# Patient Record
Sex: FEMALE | Race: WHITE | Employment: UNEMPLOYED | URBAN - METROPOLITAN AREA
[De-identification: names, ages, dates, MRNs, and addresses within clinical notes are randomized per-mention and may not be internally consistent; named-entity substitution may affect disease eponyms.]

---

## 2017-02-26 PROBLEM — L97.529 DIABETIC ULCER OF LEFT FOOT ASSOCIATED WITH TYPE 1 DIABETES MELLITUS (HCC): Status: ACTIVE | Noted: 2017-02-26

## 2017-02-26 PROBLEM — E10.621 DIABETIC ULCER OF LEFT FOOT ASSOCIATED WITH TYPE 1 DIABETES MELLITUS (HCC): Status: ACTIVE | Noted: 2017-02-26

## 2017-02-26 PROBLEM — I70.249 ATHEROSCLEROSIS OF NATIVE ARTERY OF LEFT LOWER EXTREMITY WITH ULCERATION (HCC): Status: ACTIVE | Noted: 2017-02-26

## 2017-04-18 PROBLEM — I70.243 ATHEROSCLEROSIS OF NATIVE ARTERY OF LEFT LOWER EXTREMITY WITH ULCERATION OF ANKLE (HCC): Status: ACTIVE | Noted: 2017-04-18

## 2017-05-10 PROBLEM — I70.245 ATHEROSCLEROSIS OF NATIVE ARTERIES OF LEFT LEG WITH ULCERATION OF OTHER PART OF FOOT (HCC): Chronic | Status: ACTIVE | Noted: 2017-05-10

## 2017-06-20 PROBLEM — I70.269 ATHEROSCLEROSIS OF NATIVE ARTERIES OF THE EXTREMITIES WITH GANGRENE (HCC): Status: ACTIVE | Noted: 2017-06-20

## 2017-06-28 PROBLEM — S78.112A ABOVE KNEE AMPUTATION OF LEFT LOWER EXTREMITY (HCC): Status: ACTIVE | Noted: 2017-06-28

## 2017-07-19 PROBLEM — I70.243 ATHEROSCLEROSIS OF NATIVE ARTERY OF LEFT LOWER EXTREMITY WITH ULCERATION OF ANKLE (HCC): Status: RESOLVED | Noted: 2017-04-18 | Resolved: 2017-07-19

## 2017-07-19 PROBLEM — I70.269 ATHEROSCLEROSIS OF NATIVE ARTERIES OF THE EXTREMITIES WITH GANGRENE (HCC): Status: RESOLVED | Noted: 2017-06-20 | Resolved: 2017-07-19

## 2017-07-19 PROBLEM — I70.245 ATHEROSCLEROSIS OF NATIVE ARTERIES OF LEFT LEG WITH ULCERATION OF OTHER PART OF FOOT (HCC): Chronic | Status: RESOLVED | Noted: 2017-05-10 | Resolved: 2017-07-19

## 2017-07-19 PROBLEM — L97.529 DIABETIC ULCER OF LEFT FOOT ASSOCIATED WITH TYPE 1 DIABETES MELLITUS (HCC): Status: RESOLVED | Noted: 2017-02-26 | Resolved: 2017-07-19

## 2017-07-19 PROBLEM — E10.621 DIABETIC ULCER OF LEFT FOOT ASSOCIATED WITH TYPE 1 DIABETES MELLITUS (HCC): Status: RESOLVED | Noted: 2017-02-26 | Resolved: 2017-07-19

## 2017-07-19 PROBLEM — I70.249 ATHEROSCLEROSIS OF NATIVE ARTERY OF LEFT LOWER EXTREMITY WITH ULCERATION (HCC): Status: RESOLVED | Noted: 2017-02-26 | Resolved: 2017-07-19

## 2018-06-04 ENCOUNTER — TELEPHONE (OUTPATIENT)
Dept: ADMINISTRATIVE | Age: 58
End: 2018-06-04

## 2018-08-24 ENCOUNTER — TELEPHONE (OUTPATIENT)
Dept: FAMILY MEDICINE CLINIC | Age: 58
End: 2018-08-24

## 2018-08-24 ENCOUNTER — OFFICE VISIT (OUTPATIENT)
Dept: FAMILY MEDICINE CLINIC | Age: 58
End: 2018-08-24
Payer: MEDICAID

## 2018-08-24 ENCOUNTER — HOSPITAL ENCOUNTER (OUTPATIENT)
Age: 58
Discharge: HOME | End: 2018-08-26
Payer: MEDICAID

## 2018-08-24 ENCOUNTER — TELEPHONE (OUTPATIENT)
Dept: CARDIOLOGY CLINIC | Age: 58
End: 2018-08-24

## 2018-08-24 VITALS
DIASTOLIC BLOOD PRESSURE: 69 MMHG | BODY MASS INDEX: 79.47 KG/M2 | HEART RATE: 74 BPM | HEIGHT: 55 IN | SYSTOLIC BLOOD PRESSURE: 126 MMHG | RESPIRATION RATE: 16 BRPM | OXYGEN SATURATION: 91 %

## 2018-08-24 DIAGNOSIS — I25.5 ISCHEMIC CARDIOMYOPATHY: ICD-10-CM

## 2018-08-24 DIAGNOSIS — Z91.89 AT RISK FOR MEDICATION NONCOMPLIANCE: ICD-10-CM

## 2018-08-24 DIAGNOSIS — S78.112A ABOVE KNEE AMPUTATION OF LEFT LOWER EXTREMITY (HCC): ICD-10-CM

## 2018-08-24 DIAGNOSIS — Z12.11 SCREENING FOR COLON CANCER: ICD-10-CM

## 2018-08-24 DIAGNOSIS — I10 ESSENTIAL HYPERTENSION: ICD-10-CM

## 2018-08-24 DIAGNOSIS — I25.10 CORONARY ARTERY DISEASE INVOLVING NATIVE CORONARY ARTERY OF NATIVE HEART WITHOUT ANGINA PECTORIS: ICD-10-CM

## 2018-08-24 DIAGNOSIS — E11.42 TYPE 2 DIABETES MELLITUS WITH DIABETIC POLYNEUROPATHY, UNSPECIFIED WHETHER LONG TERM INSULIN USE (HCC): ICD-10-CM

## 2018-08-24 DIAGNOSIS — E78.5 HYPERLIPIDEMIA WITH TARGET LDL LESS THAN 70: ICD-10-CM

## 2018-08-24 DIAGNOSIS — Z13.31 POSITIVE DEPRESSION SCREENING: ICD-10-CM

## 2018-08-24 DIAGNOSIS — E11.42 TYPE 2 DIABETES MELLITUS WITH DIABETIC POLYNEUROPATHY, UNSPECIFIED WHETHER LONG TERM INSULIN USE (HCC): Primary | ICD-10-CM

## 2018-08-24 DIAGNOSIS — E55.9 VITAMIN D DEFICIENCY: ICD-10-CM

## 2018-08-24 DIAGNOSIS — Z89.511 HISTORY OF RIGHT BELOW KNEE AMPUTATION (HCC): ICD-10-CM

## 2018-08-24 DIAGNOSIS — F32.A DEPRESSION, UNSPECIFIED DEPRESSION TYPE: ICD-10-CM

## 2018-08-24 DIAGNOSIS — Z12.31 ENCOUNTER FOR SCREENING MAMMOGRAM FOR BREAST CANCER: ICD-10-CM

## 2018-08-24 DIAGNOSIS — E11.42 DIABETIC POLYNEUROPATHY ASSOCIATED WITH TYPE 2 DIABETES MELLITUS (HCC): ICD-10-CM

## 2018-08-24 DIAGNOSIS — E78.5 DYSLIPIDEMIA: ICD-10-CM

## 2018-08-24 DIAGNOSIS — Z13.220 SCREENING FOR HYPERLIPIDEMIA: ICD-10-CM

## 2018-08-24 LAB
ALBUMIN SERPL-MCNC: 3.8 G/DL (ref 3.5–5.2)
ALP BLD-CCNC: 81 U/L (ref 35–104)
ALT SERPL-CCNC: 16 U/L (ref 0–32)
ANION GAP SERPL CALCULATED.3IONS-SCNC: 16 MMOL/L (ref 7–16)
AST SERPL-CCNC: 17 U/L (ref 0–31)
BASOPHILS ABSOLUTE: 0.04 E9/L (ref 0–0.2)
BASOPHILS RELATIVE PERCENT: 0.6 % (ref 0–2)
BILIRUB SERPL-MCNC: 0.4 MG/DL (ref 0–1.2)
BUN BLDV-MCNC: 19 MG/DL (ref 6–20)
CALCIUM SERPL-MCNC: 9.2 MG/DL (ref 8.6–10.2)
CHLORIDE BLD-SCNC: 97 MMOL/L (ref 98–107)
CHOLESTEROL, TOTAL: 375 MG/DL (ref 0–199)
CO2: 24 MMOL/L (ref 22–29)
CREAT SERPL-MCNC: 0.8 MG/DL (ref 0.5–1)
EOSINOPHILS ABSOLUTE: 0.22 E9/L (ref 0.05–0.5)
EOSINOPHILS RELATIVE PERCENT: 3.2 % (ref 0–6)
GFR AFRICAN AMERICAN: >60
GFR NON-AFRICAN AMERICAN: >60 ML/MIN/1.73
GLUCOSE BLD-MCNC: 372 MG/DL (ref 74–109)
HBA1C MFR BLD: 12.2 %
HCT VFR BLD CALC: 46.2 % (ref 34–48)
HDLC SERPL-MCNC: 33 MG/DL
HEMOGLOBIN: 14.3 G/DL (ref 11.5–15.5)
IMMATURE GRANULOCYTES #: 0.03 E9/L
IMMATURE GRANULOCYTES %: 0.4 % (ref 0–5)
LDL CHOLESTEROL CALCULATED: ABNORMAL MG/DL (ref 0–99)
LYMPHOCYTES ABSOLUTE: 1.66 E9/L (ref 1.5–4)
LYMPHOCYTES RELATIVE PERCENT: 24.3 % (ref 20–42)
MCH RBC QN AUTO: 30.6 PG (ref 26–35)
MCHC RBC AUTO-ENTMCNC: 31 % (ref 32–34.5)
MCV RBC AUTO: 98.7 FL (ref 80–99.9)
MONOCYTES ABSOLUTE: 0.49 E9/L (ref 0.1–0.95)
MONOCYTES RELATIVE PERCENT: 7.2 % (ref 2–12)
NEUTROPHILS ABSOLUTE: 4.39 E9/L (ref 1.8–7.3)
NEUTROPHILS RELATIVE PERCENT: 64.3 % (ref 43–80)
PDW BLD-RTO: 13.4 FL (ref 11.5–15)
PLATELET # BLD: 223 E9/L (ref 130–450)
PMV BLD AUTO: 10.5 FL (ref 7–12)
POTASSIUM SERPL-SCNC: 5.3 MMOL/L (ref 3.5–5)
RBC # BLD: 4.68 E12/L (ref 3.5–5.5)
SODIUM BLD-SCNC: 137 MMOL/L (ref 132–146)
TOTAL PROTEIN: 6.5 G/DL (ref 6.4–8.3)
TRIGL SERPL-MCNC: 688 MG/DL (ref 0–149)
TSH SERPL DL<=0.05 MIU/L-ACNC: 3.89 UIU/ML (ref 0.27–4.2)
VITAMIN D 25-HYDROXY: 15 NG/ML (ref 30–100)
VLDLC SERPL CALC-MCNC: ABNORMAL MG/DL
WBC # BLD: 6.8 E9/L (ref 4.5–11.5)

## 2018-08-24 PROCEDURE — 80061 LIPID PANEL: CPT

## 2018-08-24 PROCEDURE — G8431 POS CLIN DEPRES SCRN F/U DOC: HCPCS | Performed by: FAMILY MEDICINE

## 2018-08-24 PROCEDURE — G8427 DOCREV CUR MEDS BY ELIG CLIN: HCPCS | Performed by: FAMILY MEDICINE

## 2018-08-24 PROCEDURE — 84443 ASSAY THYROID STIM HORMONE: CPT

## 2018-08-24 PROCEDURE — 3046F HEMOGLOBIN A1C LEVEL >9.0%: CPT | Performed by: FAMILY MEDICINE

## 2018-08-24 PROCEDURE — 82306 VITAMIN D 25 HYDROXY: CPT

## 2018-08-24 PROCEDURE — 99205 OFFICE O/P NEW HI 60 MIN: CPT | Performed by: FAMILY MEDICINE

## 2018-08-24 PROCEDURE — 80053 COMPREHEN METABOLIC PANEL: CPT

## 2018-08-24 PROCEDURE — G8417 CALC BMI ABV UP PARAM F/U: HCPCS | Performed by: FAMILY MEDICINE

## 2018-08-24 PROCEDURE — 3017F COLORECTAL CA SCREEN DOC REV: CPT | Performed by: FAMILY MEDICINE

## 2018-08-24 PROCEDURE — 83036 HEMOGLOBIN GLYCOSYLATED A1C: CPT | Performed by: FAMILY MEDICINE

## 2018-08-24 PROCEDURE — G8598 ASA/ANTIPLAT THER USED: HCPCS | Performed by: FAMILY MEDICINE

## 2018-08-24 PROCEDURE — 1036F TOBACCO NON-USER: CPT | Performed by: FAMILY MEDICINE

## 2018-08-24 PROCEDURE — 2022F DILAT RTA XM EVC RTNOPTHY: CPT | Performed by: FAMILY MEDICINE

## 2018-08-24 PROCEDURE — 85025 COMPLETE CBC W/AUTO DIFF WBC: CPT

## 2018-08-24 PROCEDURE — 96127 BRIEF EMOTIONAL/BEHAV ASSMT: CPT | Performed by: FAMILY MEDICINE

## 2018-08-24 RX ORDER — SERTRALINE HYDROCHLORIDE 100 MG/1
100 TABLET, FILM COATED ORAL DAILY
COMMUNITY
End: 2018-08-24 | Stop reason: SDUPTHER

## 2018-08-24 RX ORDER — ISOSORBIDE MONONITRATE 60 MG/1
60 TABLET, EXTENDED RELEASE ORAL DAILY
Qty: 30 TABLET | Refills: 3 | Status: SHIPPED | OUTPATIENT
Start: 2018-08-24 | End: 2019-01-07 | Stop reason: SDUPTHER

## 2018-08-24 RX ORDER — GLIPIZIDE 5 MG/1
5 TABLET ORAL ONCE
Qty: 30 TABLET | Refills: 0 | Status: SHIPPED | OUTPATIENT
Start: 2018-08-24 | End: 2019-09-20

## 2018-08-24 RX ORDER — GABAPENTIN 600 MG/1
600 TABLET ORAL 3 TIMES DAILY
Qty: 90 TABLET | Refills: 2 | Status: SHIPPED | OUTPATIENT
Start: 2018-08-24 | End: 2018-10-01 | Stop reason: SDUPTHER

## 2018-08-24 RX ORDER — AMLODIPINE BESYLATE 2.5 MG/1
2.5 TABLET ORAL DAILY
Qty: 30 TABLET | Refills: 3 | Status: SHIPPED | OUTPATIENT
Start: 2018-08-24 | End: 2019-01-07 | Stop reason: SDUPTHER

## 2018-08-24 RX ORDER — METOPROLOL TARTRATE 100 MG/1
100 TABLET ORAL 2 TIMES DAILY
Qty: 60 TABLET | Refills: 2 | Status: SHIPPED | OUTPATIENT
Start: 2018-08-24 | End: 2019-01-14 | Stop reason: SDUPTHER

## 2018-08-24 RX ORDER — LISINOPRIL 2.5 MG/1
2.5 TABLET ORAL DAILY
Qty: 90 TABLET | Refills: 3 | Status: SHIPPED | OUTPATIENT
Start: 2018-08-24 | End: 2019-03-01 | Stop reason: SDUPTHER

## 2018-08-24 RX ORDER — ASPIRIN 81 MG/1
81 TABLET ORAL DAILY
Qty: 30 TABLET | Refills: 3 | Status: SHIPPED | OUTPATIENT
Start: 2018-08-24 | End: 2019-03-01 | Stop reason: SDUPTHER

## 2018-08-24 RX ORDER — ATORVASTATIN CALCIUM 80 MG/1
80 TABLET, FILM COATED ORAL DAILY
Qty: 30 TABLET | Refills: 3 | Status: SHIPPED | OUTPATIENT
Start: 2018-08-24 | End: 2019-03-01 | Stop reason: SDUPTHER

## 2018-08-24 RX ORDER — SERTRALINE HYDROCHLORIDE 100 MG/1
100 TABLET, FILM COATED ORAL DAILY
Qty: 30 TABLET | Refills: 2 | Status: SHIPPED | OUTPATIENT
Start: 2018-08-24 | End: 2018-12-04 | Stop reason: SDUPTHER

## 2018-08-24 RX ORDER — METOPROLOL TARTRATE 100 MG/1
100 TABLET ORAL 2 TIMES DAILY
COMMUNITY
End: 2018-08-24 | Stop reason: SDUPTHER

## 2018-08-24 RX ORDER — ONDANSETRON 4 MG/1
TABLET, FILM COATED ORAL
Qty: 30 TABLET | Refills: 0 | Status: SHIPPED | OUTPATIENT
Start: 2018-08-24 | End: 2019-09-20 | Stop reason: SDUPTHER

## 2018-08-24 RX ORDER — GLIPIZIDE 5 MG/1
5 TABLET ORAL ONCE
COMMUNITY
End: 2018-08-24 | Stop reason: SDUPTHER

## 2018-08-24 RX ORDER — SERTRALINE HYDROCHLORIDE 25 MG/1
125 TABLET, FILM COATED ORAL DAILY
Qty: 30 TABLET | Refills: 2 | Status: SHIPPED | OUTPATIENT
Start: 2018-08-24 | End: 2019-03-01

## 2018-08-24 RX ORDER — RANOLAZINE 1000 MG/1
1000 TABLET, EXTENDED RELEASE ORAL 2 TIMES DAILY
Qty: 60 TABLET | Refills: 3 | Status: SHIPPED | OUTPATIENT
Start: 2018-08-24 | End: 2019-03-01 | Stop reason: SDUPTHER

## 2018-08-24 RX ORDER — PANTOPRAZOLE SODIUM 40 MG/1
40 TABLET, DELAYED RELEASE ORAL
Qty: 30 TABLET | Refills: 0 | Status: SHIPPED | OUTPATIENT
Start: 2018-08-24 | End: 2019-01-07 | Stop reason: SDUPTHER

## 2018-08-24 ASSESSMENT — PATIENT HEALTH QUESTIONNAIRE - PHQ9
SUM OF ALL RESPONSES TO PHQ QUESTIONS 1-9: 9
SUM OF ALL RESPONSES TO PHQ9 QUESTIONS 1 & 2: 4
SUM OF ALL RESPONSES TO PHQ QUESTIONS 1-9: 9
9. THOUGHTS THAT YOU WOULD BE BETTER OFF DEAD, OR OF HURTING YOURSELF: 1
10. IF YOU CHECKED OFF ANY PROBLEMS, HOW DIFFICULT HAVE THESE PROBLEMS MADE IT FOR YOU TO DO YOUR WORK, TAKE CARE OF THINGS AT HOME, OR GET ALONG WITH OTHER PEOPLE: 0
3. TROUBLE FALLING OR STAYING ASLEEP: 0
7. TROUBLE CONCENTRATING ON THINGS, SUCH AS READING THE NEWSPAPER OR WATCHING TELEVISION: 2
5. POOR APPETITE OR OVEREATING: 1
2. FEELING DOWN, DEPRESSED OR HOPELESS: 2
4. FEELING TIRED OR HAVING LITTLE ENERGY: 0
8. MOVING OR SPEAKING SO SLOWLY THAT OTHER PEOPLE COULD HAVE NOTICED. OR THE OPPOSITE, BEING SO FIGETY OR RESTLESS THAT YOU HAVE BEEN MOVING AROUND A LOT MORE THAN USUAL: 0
6. FEELING BAD ABOUT YOURSELF - OR THAT YOU ARE A FAILURE OR HAVE LET YOURSELF OR YOUR FAMILY DOWN: 1
1. LITTLE INTEREST OR PLEASURE IN DOING THINGS: 2

## 2018-08-24 NOTE — PROGRESS NOTES
Ricki Jain   1960    Chief Complaint:     Chief Complaint   Patient presents with   Felicity Multani New Doctor    Diabetes       HPI  Ricki Jain 59 y.o. who presents as a new patient to establish care. Previous pcp was dr Estefany Velazco. Insurance would not pay for home visit anymore and needed to find a new pcp. Patient does not know what medications she should be taking is very confused about all her medications. We discussed all her medications and it seems she has run out of many of the medications she should be taking. Medication reconciliation was very difficult due to this and the majority of the visit was discussing all her medications. She has multiple medical issues including diabetes uncontrolled, cad, cardiomyopathy (does follow with cardiology however has not followed up as recommended), pvd s/p bilateral amputation, HTN, HLD, chronic pain, depression. Diabetes   This is currently her most pressing issue as most of her disease states are from uncontrolled diabetes. We discussed the need to control this for many reasons and patient understands. She states that her last A1c was 14 - today it is 12.2. At last visit she was given glipizide. She currently is using 18 units novolog before meals - though she only eats 1-2 meals a day. She does take her basal insulin however she does not take this at the same time every night. She states she sleeps all day and is difficult for her to remember all her medications. Her sugars range to 200-300. She denies any chest pain, dyspnea, swelling, neuro changes. She does admit to much pain throughout her body. She does have some phantom pain - she follows with pain management. Cad  She also was supposed to follow up with her cardiologist however patient did not get requested imaging completed (she states she could not due to glucose? - she was not exactly sure).  She denies any chest pain currently but does have chest pain and takes nitro as needed which 12/18/2013    R SFA 6x200 mm, Delatore    CARDIAC CATHETERIZATION  12/2012    [facility] AT [facility] in [address]  5/29/2013    Dr. Franklin Soto  06/22/2017    Dr. Jose Henderson      x 3    ECHO COMPL W DOP COLOR FLOW  9/22/2012 9/22/2012-echocardiogram revealed an LVEF of 60%    ECHO COMPL W DOP COLOR FLOW  1/28/2013         ECHO COMPL W DOP COLOR FLOW  10/20/2013         ECHO COMPLETE  12/13/2013         FOOT SURGERY  12/15/2013    incision and drainage with bone biopsy    LEG AMPUTATION BELOW KNEE Right 2/27/14    right below the knee amp    TOE AMPUTATION      right foot    TONSILLECTOMY      TRANSESOPHAGEAL ECHOCARDIOGRAM  10/23/2013         VASCULAR SURGERY         Social History     Social History    Marital status:      Spouse name: N/A    Number of children: N/A    Years of education: N/A     Social History Main Topics    Smoking status: Former Smoker     Packs/day: 1.00     Years: 20.00     Types: Cigarettes     Quit date: 10/4/2002    Smokeless tobacco: Never Used      Comment: stopped 10 years ago    Alcohol use No      Comment: no    Drug use: No    Sexual activity: Yes     Partners: Male     Other Topics Concern    None     Social History Narrative    None       Family History   Problem Relation Age of Onset    Diabetes Mother     Hypertension Mother     Diabetes Father     Hypertension Father     Heart Failure Father     Cancer Brother           Current Outpatient Prescriptions   Medication Sig Dispense Refill    metoprolol (LOPRESSOR) 100 MG tablet Take 1 tablet by mouth 2 times daily 60 tablet 2    sertraline (ZOLOFT) 100 MG tablet Take 1 tablet by mouth daily 30 tablet 2    sertraline (ZOLOFT) 25 MG tablet Take 5 tablets by mouth daily 30 tablet 2    glipiZIDE (GLUCOTROL) 5 MG tablet Take 1 tablet by mouth once for 1 dose 30 tablet 0    insulin aspart (NOVOLOG FLEXPEN) 100 UNIT/ML injection pen Inject 18 Units into the skin 3 times daily (before meals) 5 pen 2    insulin glargine (BASAGLAR KWIKPEN) 100 UNIT/ML injection pen Inject 65 Units into the skin nightly 5 pen 2    ondansetron (ZOFRAN) 4 MG tablet take 1 tablet by mouth every 8 hours if needed 30 tablet 0    aspirin 81 MG EC tablet Take 1 tablet by mouth daily 30 tablet 3    gabapentin (NEURONTIN) 600 MG tablet Take 1 tablet by mouth 3 times daily for 90 days. . 90 tablet 2    amLODIPine (NORVASC) 2.5 MG tablet Take 1 tablet by mouth daily 30 tablet 3    atorvastatin (LIPITOR) 80 MG tablet Take 1 tablet by mouth daily 30 tablet 3    lisinopril (PRINIVIL;ZESTRIL) 2.5 MG tablet Take 1 tablet by mouth daily 90 tablet 3    pantoprazole (PROTONIX) 40 MG tablet Take 1 tablet by mouth every morning (before breakfast) 30 tablet 0    ranolazine (RANEXA) 1000 MG extended release tablet Take 1 tablet by mouth 2 times daily 60 tablet 3    isosorbide mononitrate (IMDUR) 60 MG extended release tablet Take 1 tablet by mouth daily 30 tablet 3    blood glucose test strips (ASCENSIA AUTODISC VI;ONE TOUCH ULTRA TEST VI) strip Check 4 times daily 100 strip 5    Handicap Placard MISC by Does not apply route Patient cannot walk 200 ft without stopping to rest.    Expiration 8/2023 1 each 0    docusate sodium (COLACE, DULCOLAX) 100 MG CAPS Take 200 mg by mouth 3 times daily 180 capsule 3    Lancets MISC Give one touch or what insurance covers. Test three times a day, fasting and before lunch and supper 100 each 3     No current facility-administered medications for this visit. Allergies   Allergen Reactions    Effexor [Venlafaxine Hcl] Other (See Comments)     Altered mental status       REVIEW OF SYSTEMS  Review of Systems   Constitutional: Positive for activity change (decreased) and fatigue. Negative for chills and fever. In wheelchair due to bilateral LE amputation   HENT: Negative for ear pain, sinus pressure, sneezing and sore throat. reviewed. Laboratory/Imaging: All laboratory and radiology results have been personally reviewed by myself      ASSESSMENT/PLAN:     Diagnosis Orders   1. Type 2 diabetes mellitus with diabetic polyneuropathy, unspecified whether long term insulin use (HCC)  POCT glycosylated hemoglobin (Hb A1C)    CBC Auto Differential    Comprehensive Metabolic Panel    TSH without Reflex    [facility]   2. Coronary artery disease involving native coronary artery of native heart without angina pectoris  aspirin 81 MG EC tablet    [facility]   3. History of right below knee amputation [facility]   4. Hyperlipidemia with target LDL less than 70  Lipid Panel   5. Depression, unspecified depression type  [facility]   6. Essential hypertension  lisinopril (PRINIVIL;ZESTRIL) 2.5 MG tablet    [facility]   7. Dyslipidemia  atorvastatin (LIPITOR) 80 MG tablet   8. Vitamin D deficiency  Vitamin D 25 Hydrox, D2 & D3   9. Diabetic polyneuropathy associated with type 2 diabetes mellitus ([facility] [address]     10. Above knee amputation of left lower extremity [facility]   11. Ischemic cardiomyopathy     12. Encounter for screening mammogram for breast cancer  [facility] Digital Screen Bilateral [[id number]]   12. Screening for colon cancer     14. Screening for hyperlipidemia     15. At risk for medication noncompliance  [facility]   16. Positive depression screening  Positive Screen for Clinical Depression with a Documented Follow-up Plan      Due to medication noncompliance/confusion, will refer to home care for evaluation. She may need additional needs in home including wheelchair ramp. She also needs hanidcap placard and new wheelchair. She does have motorized wheelchair at home. Medication list was reconciled based on previous notes from cardiology in chart.  Will also need to obtain records from previous physician to assure no medications were adjusted. Did recommend for patient to follow up with cardiology as recommended. As for diabetes did recommend for patient to log her sugars so we can adjust insulin accordingly. As her fasting are in the 300s will increase basal insulin to 65 units. Her mood is depressed - she has been out of her medications these were refilled today. She will follow up in 2 weeks for re-evaluation. More than 60 minutes was spent with the patient during the encounter, during which more than half the time was spent counseling on the above concerns        HM reviewed today and updated as appropriate  Call or go to ED immediately if symptoms worsen or persist.  Future Appointments  Date Time Provider Kirk Bautista   9/11/2018 9:30 AM DO Jackson Gaytankenneth [facility]   9/24/2018 11:30 AM Krupa Cantrell MD [facility] None     Or sooner if necessary. Educational materials and/or home exercises printed for patient's review and were included in patient instructions on his/her After Visit Summary and given to patient at the end of visit.       Counseled regarding above diagnosis, including possible risks and complications,  especially if left uncontrolled.     Counseled regarding the possible side effects, risks, benefits and alternatives to treatment; patient and/or guardian verbalizes understanding, agrees, feels comfortable with and wishes to proceed with above treatment plan.     Advised patient to call with any new medication issues, and read all Rx info from pharmacy to assure aware of all possible risks and side effects of medication before taking.     Reviewed age and gender appropriate health screening exams and vaccinations.   Advised patient regarding importance of keeping up with recommended health maintenance and to schedule as soon as possible if overdue, as this is important in assessing for undiagnosed pathology, especially cancer, as well as protecting against potentially harmful/life threatening disease.          Patient and/or guardian verbalizes understanding and agrees with above counseling, assessment and plan.     All questions answered. Marv Ruvalcaba, [address], DO  8/27/2018        NOTE: This report was transcribed using voice recognition software. Every effort was made to ensure accuracy; however, inadvertent computerized transcription errors may be present    On the basis of positive PHQ-9 screening (PHQ-9 Total Score: 9), the following plan was implemented: medication prescribed: Zoloft- 125- patient will call for any significant medication side effects or worsening symptoms of depression. Patient will follow-up in 2 week(s) with PCP.

## 2018-08-27 ASSESSMENT — ENCOUNTER SYMPTOMS
ABDOMINAL PAIN: 0
BACK PAIN: 1
DIARRHEA: 0
SHORTNESS OF BREATH: 0
COUGH: 0
SORE THROAT: 0
CONSTIPATION: 0
SINUS PRESSURE: 0
EYE PAIN: 0

## 2018-08-27 NOTE — TELEPHONE ENCOUNTER
She should have followed up by now. Probably should have had a 6 month follow-up. Have her come in for a routine office visit. He can discuss all of this again.

## 2018-08-28 ENCOUNTER — TELEPHONE (OUTPATIENT)
Dept: FAMILY MEDICINE CLINIC | Age: 58
End: 2018-08-28

## 2018-08-28 NOTE — TELEPHONE ENCOUNTER
Medications not covered under patients plan are novolog, freestyle and zoloft 5 tabs of 25 mg. Wish for these to be changed per insurance.  Please prior auth the novolog pen as patients a1c is uncontrolled and unable to do the vial form

## 2018-11-20 ENCOUNTER — TELEPHONE (OUTPATIENT)
Dept: FAMILY MEDICINE CLINIC | Age: 58
End: 2018-11-20

## 2018-12-05 RX ORDER — SERTRALINE HYDROCHLORIDE 100 MG/1
TABLET, FILM COATED ORAL
Qty: 30 TABLET | Refills: 2 | Status: SHIPPED | OUTPATIENT
Start: 2018-12-05 | End: 2019-03-01 | Stop reason: SDUPTHER

## 2018-12-24 RX ORDER — GLIPIZIDE 5 MG/1
5 TABLET ORAL ONCE
Qty: 30 TABLET | Refills: 0 | Status: CANCELLED | OUTPATIENT
Start: 2018-12-24 | End: 2018-12-24

## 2018-12-24 RX ORDER — INSULIN GLARGINE 100 [IU]/ML
INJECTION, SOLUTION SUBCUTANEOUS
Qty: 15 ML | Refills: 2 | Status: SHIPPED | OUTPATIENT
Start: 2018-12-24 | End: 2019-03-01 | Stop reason: SDUPTHER

## 2018-12-24 RX ORDER — SERTRALINE HYDROCHLORIDE 100 MG/1
TABLET, FILM COATED ORAL
Qty: 30 TABLET | Refills: 2 | Status: CANCELLED | OUTPATIENT
Start: 2018-12-24

## 2018-12-24 NOTE — TELEPHONE ENCOUNTER
From: Дмитрий Landry  Sent: 12/22/2018 1:03 PM EST  Subject: Medication Renewal Request    Дмитрий Landry would like a refill of the following medications:     insulin glargine (BASAGLAR KWIKPEN) 100 UNIT/ML injection pen [Violetta Diaz, DO]   Patient Comment: as soon as possible    Preferred pharmacy: [facility] [id number], [address] [place] [address] [address]

## 2018-12-24 NOTE — TELEPHONE ENCOUNTER
From: Shazia Reddy  Sent: 12/22/2018 1:01 PM EST  Subject: Medication Renewal Request    Shazia Reddy would like a refill of the following medications:     blood glucose test strips (ASCENSIA AUTODISC VI;ONE TOUCH ULTRA TEST VI) strip Jeraldene Boast, DO]    Preferred pharmacy: [id number] [phone] - F [phone]        Medication renewals requested in this message routed separately:     glipiZIDE (GLUCOTROL) 5 MG tablet [Violetta Diaz, DO]     HUMALOG 100 UNIT/ML injection vial [Violetta Diaz, DO]   Patient Comment: need it as soon as possible     sertraline (ZOLOFT) 100 MG tablet [Violetta Diaz, DO]

## 2018-12-26 ENCOUNTER — TELEPHONE (OUTPATIENT)
Dept: FAMILY MEDICINE CLINIC | Age: 58
End: 2018-12-26

## 2018-12-26 RX ORDER — BLOOD-GLUCOSE METER
1 EACH MISCELLANEOUS DAILY
Qty: 1 KIT | Refills: 0 | Status: CANCELLED | OUTPATIENT
Start: 2018-12-26

## 2019-01-08 RX ORDER — PANTOPRAZOLE SODIUM 40 MG/1
TABLET, DELAYED RELEASE ORAL
Qty: 30 TABLET | Refills: 0 | Status: SHIPPED | OUTPATIENT
Start: 2019-01-08 | End: 2019-03-01 | Stop reason: SDUPTHER

## 2019-01-08 RX ORDER — AMLODIPINE BESYLATE 2.5 MG/1
TABLET ORAL
Qty: 30 TABLET | Refills: 3 | Status: SHIPPED | OUTPATIENT
Start: 2019-01-08 | End: 2019-03-01 | Stop reason: SDUPTHER

## 2019-01-08 RX ORDER — ISOSORBIDE MONONITRATE 60 MG/1
TABLET, EXTENDED RELEASE ORAL
Qty: 30 TABLET | Refills: 3 | Status: SHIPPED | OUTPATIENT
Start: 2019-01-08 | End: 2019-03-01 | Stop reason: SDUPTHER

## 2019-01-15 RX ORDER — METOPROLOL TARTRATE 100 MG/1
TABLET ORAL
Qty: 60 TABLET | Refills: 2 | Status: SHIPPED | OUTPATIENT
Start: 2019-01-15 | End: 2019-03-01 | Stop reason: SDUPTHER

## 2019-03-01 ENCOUNTER — OFFICE VISIT (OUTPATIENT)
Dept: INTERNAL MEDICINE | Age: 59
End: 2019-03-01
Payer: MEDICAID

## 2019-03-01 VITALS
HEART RATE: 72 BPM | BODY MASS INDEX: 79.47 KG/M2 | DIASTOLIC BLOOD PRESSURE: 59 MMHG | SYSTOLIC BLOOD PRESSURE: 129 MMHG | TEMPERATURE: 98.1 F | HEIGHT: 55 IN | RESPIRATION RATE: 18 BRPM

## 2019-03-01 DIAGNOSIS — E55.9 VITAMIN D DEFICIENCY: ICD-10-CM

## 2019-03-01 DIAGNOSIS — E11.42 TYPE 2 DIABETES MELLITUS WITH DIABETIC POLYNEUROPATHY, UNSPECIFIED WHETHER LONG TERM INSULIN USE (HCC): Primary | ICD-10-CM

## 2019-03-01 DIAGNOSIS — Z12.4 PAP SMEAR FOR CERVICAL CANCER SCREENING: ICD-10-CM

## 2019-03-01 DIAGNOSIS — K59.00 CONSTIPATION, UNSPECIFIED CONSTIPATION TYPE: ICD-10-CM

## 2019-03-01 DIAGNOSIS — Z12.11 SCREENING FOR COLON CANCER: ICD-10-CM

## 2019-03-01 DIAGNOSIS — E78.5 DYSLIPIDEMIA: ICD-10-CM

## 2019-03-01 DIAGNOSIS — I25.10 CORONARY ARTERY DISEASE INVOLVING NATIVE CORONARY ARTERY OF NATIVE HEART WITHOUT ANGINA PECTORIS: ICD-10-CM

## 2019-03-01 DIAGNOSIS — K21.9 GASTROESOPHAGEAL REFLUX DISEASE, ESOPHAGITIS PRESENCE NOT SPECIFIED: ICD-10-CM

## 2019-03-01 DIAGNOSIS — I25.5 ISCHEMIC CARDIOMYOPATHY: ICD-10-CM

## 2019-03-01 DIAGNOSIS — Z12.39 BREAST CANCER SCREENING: ICD-10-CM

## 2019-03-01 DIAGNOSIS — I21.4 NSTEMI (NON-ST ELEVATED MYOCARDIAL INFARCTION) (HCC): ICD-10-CM

## 2019-03-01 DIAGNOSIS — Z23 NEEDS FLU SHOT: ICD-10-CM

## 2019-03-01 DIAGNOSIS — Z23 ENCOUNTER FOR HERPES ZOSTER VACCINATION: ICD-10-CM

## 2019-03-01 DIAGNOSIS — S78.112A ABOVE KNEE AMPUTATION OF LEFT LOWER EXTREMITY (HCC): ICD-10-CM

## 2019-03-01 DIAGNOSIS — I10 ESSENTIAL HYPERTENSION: ICD-10-CM

## 2019-03-01 DIAGNOSIS — F32.A DEPRESSION, UNSPECIFIED DEPRESSION TYPE: ICD-10-CM

## 2019-03-01 LAB — HBA1C MFR BLD: 11.3 %

## 2019-03-01 PROCEDURE — 90686 IIV4 VACC NO PRSV 0.5 ML IM: CPT

## 2019-03-01 PROCEDURE — G0008 ADMIN INFLUENZA VIRUS VAC: HCPCS

## 2019-03-01 PROCEDURE — 3046F HEMOGLOBIN A1C LEVEL >9.0%: CPT | Performed by: INTERNAL MEDICINE

## 2019-03-01 PROCEDURE — 83036 HEMOGLOBIN GLYCOSYLATED A1C: CPT | Performed by: INTERNAL MEDICINE

## 2019-03-01 PROCEDURE — 3017F COLORECTAL CA SCREEN DOC REV: CPT | Performed by: INTERNAL MEDICINE

## 2019-03-01 PROCEDURE — G8417 CALC BMI ABV UP PARAM F/U: HCPCS | Performed by: INTERNAL MEDICINE

## 2019-03-01 PROCEDURE — G8427 DOCREV CUR MEDS BY ELIG CLIN: HCPCS | Performed by: INTERNAL MEDICINE

## 2019-03-01 PROCEDURE — 6360000002 HC RX W HCPCS

## 2019-03-01 PROCEDURE — 2022F DILAT RTA XM EVC RTNOPTHY: CPT | Performed by: INTERNAL MEDICINE

## 2019-03-01 PROCEDURE — 99215 OFFICE O/P EST HI 40 MIN: CPT | Performed by: INTERNAL MEDICINE

## 2019-03-01 PROCEDURE — 99213 OFFICE O/P EST LOW 20 MIN: CPT | Performed by: INTERNAL MEDICINE

## 2019-03-01 PROCEDURE — G8482 FLU IMMUNIZE ORDER/ADMIN: HCPCS | Performed by: INTERNAL MEDICINE

## 2019-03-01 PROCEDURE — 1036F TOBACCO NON-USER: CPT | Performed by: INTERNAL MEDICINE

## 2019-03-01 PROCEDURE — G8598 ASA/ANTIPLAT THER USED: HCPCS | Performed by: INTERNAL MEDICINE

## 2019-03-01 RX ORDER — AMLODIPINE BESYLATE 2.5 MG/1
TABLET ORAL
Qty: 30 TABLET | Refills: 3 | Status: SHIPPED | OUTPATIENT
Start: 2019-03-01 | End: 2019-09-20 | Stop reason: SDUPTHER

## 2019-03-01 RX ORDER — PANTOPRAZOLE SODIUM 40 MG/1
TABLET, DELAYED RELEASE ORAL
Qty: 30 TABLET | Refills: 3 | Status: SHIPPED | OUTPATIENT
Start: 2019-03-01 | End: 2019-07-11 | Stop reason: SDUPTHER

## 2019-03-01 RX ORDER — LISINOPRIL 2.5 MG/1
2.5 TABLET ORAL DAILY
Qty: 30 TABLET | Refills: 3 | Status: SHIPPED | OUTPATIENT
Start: 2019-03-01 | End: 2019-09-20 | Stop reason: SDUPTHER

## 2019-03-01 RX ORDER — SERTRALINE HYDROCHLORIDE 100 MG/1
TABLET, FILM COATED ORAL
Qty: 30 TABLET | Refills: 3 | Status: SHIPPED | OUTPATIENT
Start: 2019-03-01 | End: 2019-07-21 | Stop reason: SDUPTHER

## 2019-03-01 RX ORDER — LANCETS 30 GAUGE
EACH MISCELLANEOUS
Qty: 100 EACH | Refills: 2 | Status: SHIPPED | OUTPATIENT
Start: 2019-03-01 | End: 2019-09-20 | Stop reason: SDUPTHER

## 2019-03-01 RX ORDER — ERGOCALCIFEROL 1.25 MG/1
50000 CAPSULE ORAL WEEKLY
Qty: 4 CAPSULE | Refills: 3 | Status: CANCELLED | OUTPATIENT
Start: 2019-03-01

## 2019-03-01 RX ORDER — ASPIRIN 81 MG/1
81 TABLET ORAL DAILY
Qty: 30 TABLET | Refills: 3 | Status: SHIPPED | OUTPATIENT
Start: 2019-03-01 | End: 2019-06-28 | Stop reason: SDUPTHER

## 2019-03-01 RX ORDER — RANOLAZINE 1000 MG/1
1000 TABLET, EXTENDED RELEASE ORAL 2 TIMES DAILY
Qty: 60 TABLET | Refills: 2 | Status: SHIPPED | OUTPATIENT
Start: 2019-03-01 | End: 2019-07-01 | Stop reason: SDUPTHER

## 2019-03-01 RX ORDER — ONDANSETRON 4 MG/1
TABLET, FILM COATED ORAL
Qty: 30 TABLET | Refills: 2 | Status: CANCELLED | OUTPATIENT
Start: 2019-03-01

## 2019-03-01 RX ORDER — PSEUDOEPHEDRINE HCL 30 MG
200 TABLET ORAL 3 TIMES DAILY
Qty: 180 CAPSULE | Refills: 2 | Status: SHIPPED | OUTPATIENT
Start: 2019-03-01 | End: 2019-03-01

## 2019-03-01 RX ORDER — ISOSORBIDE MONONITRATE 60 MG/1
TABLET, EXTENDED RELEASE ORAL
Qty: 30 TABLET | Refills: 3 | Status: SHIPPED | OUTPATIENT
Start: 2019-03-01 | End: 2019-09-20

## 2019-03-01 RX ORDER — DOCUSATE SODIUM 100 MG/1
100 CAPSULE, LIQUID FILLED ORAL 4 TIMES DAILY PRN
Qty: 30 CAPSULE | Refills: 2 | Status: SHIPPED | OUTPATIENT
Start: 2019-03-01 | End: 2019-09-20 | Stop reason: SDUPTHER

## 2019-03-01 RX ORDER — ATORVASTATIN CALCIUM 80 MG/1
80 TABLET, FILM COATED ORAL DAILY
Qty: 90 TABLET | Refills: 0 | Status: SHIPPED | OUTPATIENT
Start: 2019-03-01 | End: 2019-09-20 | Stop reason: SDUPTHER

## 2019-03-01 RX ORDER — METOPROLOL TARTRATE 100 MG/1
TABLET ORAL
Qty: 60 TABLET | Refills: 3 | Status: SHIPPED | OUTPATIENT
Start: 2019-03-01 | End: 2019-07-01 | Stop reason: SDUPTHER

## 2019-03-01 ASSESSMENT — ENCOUNTER SYMPTOMS
ABDOMINAL PAIN: 0
DIARRHEA: 0
NAUSEA: 0
SORE THROAT: 0
COUGH: 0
SHORTNESS OF BREATH: 0
VOMITING: 0

## 2019-03-26 ENCOUNTER — TELEPHONE (OUTPATIENT)
Dept: OBGYN | Age: 59
End: 2019-03-26

## 2019-03-28 ENCOUNTER — TELEPHONE (OUTPATIENT)
Dept: INTERNAL MEDICINE | Age: 59
End: 2019-03-28

## 2019-04-01 ENCOUNTER — TELEPHONE (OUTPATIENT)
Dept: INTERNAL MEDICINE | Age: 59
End: 2019-04-01

## 2019-04-10 ENCOUNTER — TELEPHONE (OUTPATIENT)
Dept: INTERNAL MEDICINE | Age: 59
End: 2019-04-10

## 2019-05-03 ENCOUNTER — TELEPHONE (OUTPATIENT)
Dept: INTERNAL MEDICINE | Age: 59
End: 2019-05-03

## 2019-05-06 NOTE — PROGRESS NOTES
JOSUÉ-S received referral from nurse as spouse requested script for ramp for patient. SW called [employer] to determine status of referral from March. Spoke with Jayden Camara who states that patient and spouse declined assistance from [employer] as they did not feel they needed it at the time. She reports she referred them to [facility] assistance for the ramp as she reports spouse states he also needs the ramp and often only covered through [employer] or [employer]. Relayed to nurse who LVM with patients spouse and to transfer to  if he returns call.

## 2019-05-08 ENCOUNTER — CARE COORDINATION (OUTPATIENT)
Dept: CARE COORDINATION | Age: 59
End: 2019-05-08

## 2019-05-08 NOTE — CARE COORDINATION
Ambulatory Care Coordination Note  5/8/2019  CM Risk Score: 5  Angelita Mortality Risk Score:      ACC: Dalila Lott RN    Summary Note:   ACC spoke with Kuldeep Zepeda and she is agreeable to care coordination. She states she lives with her  and autistic daughter. She is a double leg amputee and is wheelchair dependent. She states she does not always take her medications on time as prescribed. She declined pre-packaged medications, she states she tried in the past and it did not work for her. She does not like to check her blood sugar as often as prescribed because she states her hands are numb and she has trouble using the glucometer. She states her blood sugars are 200's-300's. Her current A1C is 11.3. She is currently trying to get a wheelchair ramp for her home, she has been in touch with [facility]. Future appointments reviewed. PLAN  Send diabetes zone handout and daily blood glucose log. Follow up in 1 week and review diabetic zones and blood sugars with next interaction. Ambulatory Care Coordination Assessment    Care Coordination Protocol  Program Enrollment:  Rising Risk  Referral from Primary Care Provider:  No  Week 1 - Initial Assessment     Do you have all of your prescriptions and are they filled?:  Yes  Barriers to medication adherence:  Forgets to take  Are you able to afford your medications?:  Yes  How often do you have trouble taking your medications the way you have been told to take them?:  Sometimes I take them as prescribed. Do you have Home O2 Therapy?:  No      Ability to seek help/take action for Emergent Urgent situations i.e. fire, crime, inclement weather or health crisis. :  Dependent  Ability to ambulate to restroom:  Needs Assistance  Ability handle personal hygeine needs (bathing/dressing/grooming):  Needs Assistance  Ability to manage Medications:  Needs Assistance  Ability to prepare Food Preparation:  Needs Assistance  Ability to maintain home (clean home, laundry): Needs Assistance  Ability to drive and/or has transportation:  Dependent  Ability to do shopping:  Dependent  Ability to manage finances:  Dependent  Is patient able to live independently?:  No     Current Housing:  Private Residence        Per the Fall Risk Screening, did the patient have 2 or more falls or 1 fall with injury in the past year?:  Yes  How often do you think you are about to fall and you do NOT fall? For example, you grab something to stabilize yourself or hold onto a wall/furniture?:  Never  Use of a Mobility Aid:  Yes (Comment: wheel chair, double amputee)  Difficulty walking/impaired gait:  Yes  Issues with feet or shoes like numbness, edema, shoes not fitting:  No (Comment: double amputee)  Changes in vision, poor vision or poor lighting in environment:  Yes (Comment: glaucoma)  Dizziness:  No  Other Fall Risk:  No     Frequent urination at night?:  Yes  Do you use rails/bars?:  No  Do you have a non-slip tub mat?:  No     Are you experiencing loss of meaning?:  No  Are you experiencing loss of hope and peace?:  Yes     Suggested Interventions and Community Resources  Diabetes Education:  Not Started   Fall Risk Prevention: In Process Medi Set or Pill Pack:  Declined   Pharmacist:  Not Started   Senior Services: In Process   Social Work:  In Process   Zone Management Tools: In Process                  Prior to Admission medications    Medication Sig Start Date End Date Taking? Authorizing Provider   gabapentin (NEURONTIN) 600 MG tablet Take 1 tablet by mouth 3 times daily for 90 days. 4/3/19 7/2/19  Jace Kelley MD   HYDROcodone-acetaminophen (NORCO) 5-325 MG per tablet Take 1 tablet by mouth every 6 hours as needed for Pain for up to 30 days.  6/2/19 7/2/19  Jace Kelley MD   zoster recombinant adjuvanted vaccine [facility]) 50 MCG/0.5ML SUSR injection 1 dose now and repeat in 2-6 months 3/1/19   Sandrine Daly MD   metoprolol (LOPRESSOR) 100 MG tablet take 1 tablet by mouth twice a day 3/1/19   Ale Colon MD   isosorbide mononitrate (IMDUR) 60 MG extended release tablet take 1 tablet by mouth once daily 3/1/19   Ale Colon MD   pantoprazole (PROTONIX) 40 MG tablet take 1 tablet by mouth every morning BEFORE BREAKFAST 3/1/19   Ale Colon MD   amLODIPine (NORVASC) 2.5 MG tablet take 1 tablet by mouth once daily 3/1/19   Ale Colon MD   insulin glargine (BASAGLAR KWIKPEN) 100 UNIT/ML injection pen inject 65 units NIGHTLY 3/1/19   Ale Colon MD   blood glucose test strips (ASCENSIA AUTODISC VI;ONE TOUCH ULTRA TEST VI) strip Check 4 times daily 3/1/19   Ale Colon MD   sertraline (ZOLOFT) 100 MG tablet take 1 tablet by mouth once daily 3/1/19   Ale Colon MD   insulin lispro (HUMALOG) 100 UNIT/ML injection vial inject 18 units three times a day BEFORE MEALS 3/1/19   Ale Colon MD   aspirin 81 MG EC tablet Take 1 tablet by mouth daily 3/1/19   Ale Colon MD   atorvastatin (LIPITOR) 80 MG tablet Take 1 tablet by mouth daily 3/1/19   Ale Colon MD   lisinopril (PRINIVIL;ZESTRIL) 2.5 MG tablet Take 1 tablet by mouth daily 3/1/19   Ale Colon MD   ranolazine (RANEXA) 1000 MG extended release tablet Take 1 tablet by mouth 2 times daily 3/1/19   Ale Colon MD   Lancets MISC Give one touch or what insurance covers.  Test three times a day, fasting and before lunch and supper 3/1/19   Ale Colon MD   docusate sodium (COLACE) 100 MG capsule Take 1 capsule by mouth 4 times daily as needed for Constipation 3/1/19   Ale Colon MD   glipiZIDE (GLUCOTROL) 5 MG tablet Take 1 tablet by mouth once for 1 dose 8/24/18 8/24/18  Madie Laguerre,    ondansetron (ZOFRAN) 4 MG tablet take 1 tablet by mouth every 8 hours if needed 8/24/18   Enid Shipley,    Handicap Placard MISC by Does not apply route Patient cannot walk 200 ft without stopping to rest.    Expiration 8/2023 8/24/18   Madie Laguerre, DO       Future Appointments   Date Time Provider Kirk Bautista   6/13/2019  1:00 PM Ale oClon MD [facility]   7/3/2019 11:30 AM Jas Valles MD [facility] None     ,   Diabetes Assessment    Medic Alert ID:  No  Meal Planning:  Avoidance of concentrated sweets   How often do you test your blood sugar?:  Daily, Bedtime, Meals (Comment: not consistent)   Do you have barriers with adherence to non-pharmacologic self-management interventions?  (Nutrition/Exercise/Self-Monitoring):  Yes   Have you ever had to go to the ED for symptoms of low blood sugar?:  No       Do you have hyperglycemia symptoms?:  Yes   Frequency of Episodes:  3 Per:  Week   Do you have hypoglycemia symptoms?:  No   Last Blood Sugar Value:  210   Blood Sugar Monitoring Regimen:  Morning Fasting   Blood Sugar Trends:  Steady Increase       and   General Assessment    Do you have any symptoms that are causing concern?:  No

## 2019-05-08 NOTE — LETTER
May 8, 2019    Florina Ken  [address]      Dear López Payer: It was a pleasure talking with you. I have included information about diabetes that we talked about during our conversation. Please review, and if you have any questions or concerns please feel free to discuss with your physician at your next appointment, or you can contact me at [phone]. Please review the diabetic zones. Please record your blood sugars when you do take it. I look forward to talking with you soon! If you have any questions or concerns, please don't hesitate to call.       Sincerely,    Chase Sanchez RN  Care Coordinator  Office: [phone]  Cell:  [phone]

## 2019-05-10 ENCOUNTER — TELEPHONE (OUTPATIENT)
Dept: INTERNAL MEDICINE | Age: 59
End: 2019-05-10

## 2019-05-21 ENCOUNTER — NURSE ONLY (OUTPATIENT)
Dept: INTERNAL MEDICINE | Age: 59
End: 2019-05-21
Payer: MEDICAID

## 2019-05-21 ENCOUNTER — TELEPHONE (OUTPATIENT)
Dept: INTERNAL MEDICINE | Age: 59
End: 2019-05-21

## 2019-05-21 DIAGNOSIS — Z12.11 SCREENING FOR COLON CANCER: ICD-10-CM

## 2019-05-21 LAB
CONTROL: NORMAL
HEMOCCULT STL QL: NEGATIVE

## 2019-05-21 PROCEDURE — 82274 ASSAY TEST FOR BLOOD FECAL: CPT

## 2019-05-22 ENCOUNTER — CARE COORDINATION (OUTPATIENT)
Dept: CARE COORDINATION | Age: 59
End: 2019-05-22

## 2019-06-06 ENCOUNTER — TELEPHONE (OUTPATIENT)
Dept: INTERNAL MEDICINE | Age: 59
End: 2019-06-06

## 2019-06-19 ENCOUNTER — CARE COORDINATION (OUTPATIENT)
Dept: CARE COORDINATION | Age: 59
End: 2019-06-19

## 2019-06-28 DIAGNOSIS — I25.10 CORONARY ARTERY DISEASE INVOLVING NATIVE CORONARY ARTERY OF NATIVE HEART WITHOUT ANGINA PECTORIS: ICD-10-CM

## 2019-06-28 RX ORDER — ACETAMINOPHEN/DIPHENHYDRAMINE 500MG-25MG
TABLET ORAL
Qty: 30 TABLET | Refills: 1 | Status: SHIPPED | OUTPATIENT
Start: 2019-06-28 | End: 2019-09-20 | Stop reason: SDUPTHER

## 2019-07-01 DIAGNOSIS — I21.4 NSTEMI (NON-ST ELEVATED MYOCARDIAL INFARCTION) (HCC): ICD-10-CM

## 2019-07-01 DIAGNOSIS — I25.10 CORONARY ARTERY DISEASE INVOLVING NATIVE CORONARY ARTERY OF NATIVE HEART WITHOUT ANGINA PECTORIS: ICD-10-CM

## 2019-07-02 RX ORDER — METOPROLOL TARTRATE 100 MG/1
TABLET ORAL
Qty: 60 TABLET | Refills: 3 | Status: SHIPPED | OUTPATIENT
Start: 2019-07-02 | End: 2019-09-20 | Stop reason: SDUPTHER

## 2019-07-02 RX ORDER — RANOLAZINE 1000 MG/1
TABLET, EXTENDED RELEASE ORAL
Qty: 60 TABLET | Refills: 2 | Status: SHIPPED | OUTPATIENT
Start: 2019-07-02 | End: 2019-09-20

## 2019-07-11 DIAGNOSIS — K21.9 GASTROESOPHAGEAL REFLUX DISEASE, ESOPHAGITIS PRESENCE NOT SPECIFIED: ICD-10-CM

## 2019-07-11 RX ORDER — PANTOPRAZOLE SODIUM 40 MG/1
TABLET, DELAYED RELEASE ORAL
Qty: 30 TABLET | Refills: 1 | Status: SHIPPED | OUTPATIENT
Start: 2019-07-11 | End: 2019-09-20

## 2019-07-17 ENCOUNTER — CARE COORDINATION (OUTPATIENT)
Dept: CARE COORDINATION | Age: 59
End: 2019-07-17

## 2019-07-21 DIAGNOSIS — F32.A DEPRESSION, UNSPECIFIED DEPRESSION TYPE: ICD-10-CM

## 2019-07-23 DIAGNOSIS — E11.42 TYPE 2 DIABETES MELLITUS WITH DIABETIC POLYNEUROPATHY, UNSPECIFIED WHETHER LONG TERM INSULIN USE (HCC): ICD-10-CM

## 2019-07-24 RX ORDER — SERTRALINE HYDROCHLORIDE 100 MG/1
TABLET, FILM COATED ORAL
Qty: 30 TABLET | Refills: 1 | Status: SHIPPED | OUTPATIENT
Start: 2019-07-24 | End: 2019-09-17 | Stop reason: SDUPTHER

## 2019-08-05 ENCOUNTER — TELEPHONE (OUTPATIENT)
Dept: INTERNAL MEDICINE | Age: 59
End: 2019-08-05

## 2019-08-07 ENCOUNTER — TELEPHONE (OUTPATIENT)
Dept: INTERNAL MEDICINE | Age: 59
End: 2019-08-07

## 2019-08-22 ENCOUNTER — TELEPHONE (OUTPATIENT)
Dept: INTERNAL MEDICINE | Age: 59
End: 2019-08-22

## 2019-08-22 NOTE — TELEPHONE ENCOUNTER
Reached out to patient to discuss active labs. Unable to reach to patient, left a voicemail for patient to call me back to review active labs.

## 2019-09-04 ENCOUNTER — CARE COORDINATION (OUTPATIENT)
Dept: CARE COORDINATION | Age: 59
End: 2019-09-04

## 2019-09-05 DIAGNOSIS — I10 ESSENTIAL HYPERTENSION: ICD-10-CM

## 2019-09-05 RX ORDER — LISINOPRIL 2.5 MG/1
TABLET ORAL
Qty: 90 TABLET | Refills: 3 | OUTPATIENT
Start: 2019-09-05

## 2019-09-17 ENCOUNTER — HOSPITAL ENCOUNTER (OUTPATIENT)
Age: 59
Discharge: HOME | End: 2019-09-17
Payer: MEDICAID

## 2019-09-17 DIAGNOSIS — F32.A DEPRESSION, UNSPECIFIED DEPRESSION TYPE: ICD-10-CM

## 2019-09-17 LAB
ANION GAP SERPL CALCULATED.3IONS-SCNC: 14 MMOL/L (ref 7–16)
BUN BLDV-MCNC: 27 MG/DL (ref 8–23)
CALCIUM SERPL-MCNC: 9.1 MG/DL (ref 8.6–10.2)
CHLORIDE BLD-SCNC: 98 MMOL/L (ref 98–107)
CHOLESTEROL, TOTAL: 407 MG/DL (ref 0–199)
CO2: 27 MMOL/L (ref 22–29)
CREAT SERPL-MCNC: 1 MG/DL (ref 0.5–1)
GFR AFRICAN AMERICAN: >60
GFR NON-AFRICAN AMERICAN: 56 ML/MIN/1.73
GLUCOSE BLD-MCNC: 256 MG/DL (ref 74–99)
HBA1C MFR BLD: 10.1 % (ref 4–5.6)
HDLC SERPL-MCNC: 35 MG/DL
LDL CHOLESTEROL CALCULATED: ABNORMAL MG/DL (ref 0–99)
POTASSIUM SERPL-SCNC: 4.4 MMOL/L (ref 3.5–5)
SODIUM BLD-SCNC: 139 MMOL/L (ref 132–146)
TRIGL SERPL-MCNC: 616 MG/DL (ref 0–149)
VLDLC SERPL CALC-MCNC: ABNORMAL MG/DL

## 2019-09-17 PROCEDURE — 80061 LIPID PANEL: CPT

## 2019-09-17 PROCEDURE — 36415 COLL VENOUS BLD VENIPUNCTURE: CPT

## 2019-09-17 PROCEDURE — 83036 HEMOGLOBIN GLYCOSYLATED A1C: CPT

## 2019-09-17 PROCEDURE — 80048 BASIC METABOLIC PNL TOTAL CA: CPT

## 2019-09-18 RX ORDER — SERTRALINE HYDROCHLORIDE 100 MG/1
TABLET, FILM COATED ORAL
Qty: 30 TABLET | Refills: 0 | Status: SHIPPED | OUTPATIENT
Start: 2019-09-18 | End: 2019-09-20 | Stop reason: SDUPTHER

## 2019-09-19 NOTE — PROGRESS NOTES
morning BEFORE BREAKFAST 30 tablet 1    metoprolol (LOPRESSOR) 100 MG tablet take 1 tablet by mouth twice a day 60 tablet 3    ranolazine (RANEXA) 1000 MG extended release tablet take 1 tablet by mouth twice a day 60 tablet 2    RA ASPIRIN EC 81 MG EC tablet take 1 tablet by mouth once daily 30 tablet 1    zoster recombinant adjuvanted vaccine (SHINGRIX) 50 MCG/0.5ML SUSR injection 1 dose now and repeat in 2-6 months 0.5 mL 0    isosorbide mononitrate (IMDUR) 60 MG extended release tablet take 1 tablet by mouth once daily 30 tablet 3    amLODIPine (NORVASC) 2.5 MG tablet take 1 tablet by mouth once daily 30 tablet 3    blood glucose test strips (ASCENSIA AUTODISC VI;ONE TOUCH ULTRA TEST VI) strip Check 4 times daily 100 strip 3    insulin lispro (HUMALOG) 100 UNIT/ML injection vial inject 18 units three times a day BEFORE MEALS 10 vial 3    atorvastatin (LIPITOR) 80 MG tablet Take 1 tablet by mouth daily 90 tablet 0    lisinopril (PRINIVIL;ZESTRIL) 2.5 MG tablet Take 1 tablet by mouth daily 30 tablet 3    Lancets MISC Give one touch or what insurance covers. Test three times a day, fasting and before lunch and supper 100 each 2    docusate sodium (COLACE) 100 MG capsule Take 1 capsule by mouth 4 times daily as needed for Constipation 30 capsule 2    glipiZIDE (GLUCOTROL) 5 MG tablet Take 1 tablet by mouth once for 1 dose 30 tablet 0    ondansetron (ZOFRAN) 4 MG tablet take 1 tablet by mouth every 8 hours if needed 30 tablet 0    Handicap Placard [facility] by Does not apply route Patient cannot walk 200 ft without stopping to rest.    Expiration 8/2023 1 each 0     No current facility-administered medications on file prior to visit. OBJECTIVE:    VS  BP (!) 130/59   Pulse 67   Temp 97.2 °F (36.2 °C) (Oral)   Resp 12       :Physical Exam   Constitutional: She is oriented to person, place, and time. She appears well-developed and well-nourished. HENT:   Head: Normocephalic and atraumatic.    Eyes: Zoloft. Chronic pain - follows with pain clinic, on gabapentin      Flu vaccine: will return to get vaccine here in clinic  Shingles vaccine - script given, ff up next visit to see if she got vaccinated      RTC: ff up with PCP, Dr Saniya Hearn week of 11/25    I have reviewed my findings andrecommendations with Lorena Guerra and Dr Nancy Wade.  Brandy Thapa MD PGY-3  9/19/2019 4:56 PM

## 2019-09-20 ENCOUNTER — CARE COORDINATION (OUTPATIENT)
Dept: CARE COORDINATION | Age: 59
End: 2019-09-20

## 2019-09-20 ENCOUNTER — HOSPITAL ENCOUNTER (OUTPATIENT)
Age: 59
Discharge: HOME | End: 2019-09-20
Payer: MEDICAID

## 2019-09-20 ENCOUNTER — OFFICE VISIT (OUTPATIENT)
Dept: INTERNAL MEDICINE | Age: 59
End: 2019-09-20
Payer: MEDICAID

## 2019-09-20 VITALS
SYSTOLIC BLOOD PRESSURE: 130 MMHG | RESPIRATION RATE: 12 BRPM | TEMPERATURE: 97.2 F | DIASTOLIC BLOOD PRESSURE: 59 MMHG | HEART RATE: 67 BPM

## 2019-09-20 DIAGNOSIS — E11.42 TYPE 2 DIABETES MELLITUS WITH DIABETIC POLYNEUROPATHY, UNSPECIFIED WHETHER LONG TERM INSULIN USE (HCC): ICD-10-CM

## 2019-09-20 DIAGNOSIS — K21.9 GASTROESOPHAGEAL REFLUX DISEASE, ESOPHAGITIS PRESENCE NOT SPECIFIED: ICD-10-CM

## 2019-09-20 DIAGNOSIS — I21.4 NSTEMI (NON-ST ELEVATED MYOCARDIAL INFARCTION) (HCC): ICD-10-CM

## 2019-09-20 DIAGNOSIS — I10 ESSENTIAL HYPERTENSION: ICD-10-CM

## 2019-09-20 DIAGNOSIS — E78.5 DYSLIPIDEMIA: ICD-10-CM

## 2019-09-20 DIAGNOSIS — E78.1 HYPERTRIGLYCERIDEMIA: Primary | ICD-10-CM

## 2019-09-20 DIAGNOSIS — F32.A DEPRESSION, UNSPECIFIED DEPRESSION TYPE: ICD-10-CM

## 2019-09-20 DIAGNOSIS — I25.10 CORONARY ARTERY DISEASE INVOLVING NATIVE CORONARY ARTERY OF NATIVE HEART WITHOUT ANGINA PECTORIS: ICD-10-CM

## 2019-09-20 LAB
CREATININE URINE: 54 MG/DL (ref 29–226)
MICROALBUMIN UR-MCNC: 116.8 MG/L
MICROALBUMIN/CREAT UR-RTO: 216.3 (ref 0–30)

## 2019-09-20 PROCEDURE — 3046F HEMOGLOBIN A1C LEVEL >9.0%: CPT | Performed by: INTERNAL MEDICINE

## 2019-09-20 PROCEDURE — 99213 OFFICE O/P EST LOW 20 MIN: CPT | Performed by: INTERNAL MEDICINE

## 2019-09-20 PROCEDURE — 82570 ASSAY OF URINE CREATININE: CPT

## 2019-09-20 PROCEDURE — 1036F TOBACCO NON-USER: CPT | Performed by: INTERNAL MEDICINE

## 2019-09-20 PROCEDURE — G8417 CALC BMI ABV UP PARAM F/U: HCPCS | Performed by: INTERNAL MEDICINE

## 2019-09-20 PROCEDURE — 3017F COLORECTAL CA SCREEN DOC REV: CPT | Performed by: INTERNAL MEDICINE

## 2019-09-20 PROCEDURE — G8427 DOCREV CUR MEDS BY ELIG CLIN: HCPCS | Performed by: INTERNAL MEDICINE

## 2019-09-20 PROCEDURE — 82044 UR ALBUMIN SEMIQUANTITATIVE: CPT

## 2019-09-20 PROCEDURE — G8598 ASA/ANTIPLAT THER USED: HCPCS | Performed by: INTERNAL MEDICINE

## 2019-09-20 PROCEDURE — 2022F DILAT RTA XM EVC RTNOPTHY: CPT | Performed by: INTERNAL MEDICINE

## 2019-09-20 PROCEDURE — S9470 NUTRITIONAL COUNSELING, DIET: HCPCS | Performed by: INTERNAL MEDICINE

## 2019-09-20 RX ORDER — PANTOPRAZOLE SODIUM 40 MG/1
TABLET, DELAYED RELEASE ORAL
Qty: 30 TABLET | Status: CANCELLED | OUTPATIENT
Start: 2019-09-20

## 2019-09-20 RX ORDER — RANOLAZINE 1000 MG/1
TABLET, EXTENDED RELEASE ORAL
Qty: 60 TABLET | Status: CANCELLED | OUTPATIENT
Start: 2019-09-20

## 2019-09-20 RX ORDER — DOCUSATE SODIUM 100 MG/1
100 CAPSULE, LIQUID FILLED ORAL 4 TIMES DAILY PRN
Qty: 30 CAPSULE | Refills: 3 | Status: SHIPPED | OUTPATIENT
Start: 2019-09-20 | End: 2019-12-23 | Stop reason: ALTCHOICE

## 2019-09-20 RX ORDER — FENOFIBRATE 160 MG/1
160 TABLET ORAL DAILY
Qty: 30 TABLET | Refills: 3 | Status: SHIPPED | OUTPATIENT
Start: 2019-09-20 | End: 2019-12-23 | Stop reason: ALTCHOICE

## 2019-09-20 RX ORDER — ATORVASTATIN CALCIUM 80 MG/1
80 TABLET, FILM COATED ORAL DAILY
Qty: 90 TABLET | Refills: 3 | Status: SHIPPED | OUTPATIENT
Start: 2019-09-20 | End: 2019-12-23 | Stop reason: DRUGHIGH

## 2019-09-20 RX ORDER — LISINOPRIL 5 MG/1
5 TABLET ORAL DAILY
Qty: 30 TABLET | Refills: 3 | Status: ON HOLD | OUTPATIENT
Start: 2019-09-20 | End: 2019-12-02 | Stop reason: HOSPADM

## 2019-09-20 RX ORDER — GLIPIZIDE 5 MG/1
5 TABLET ORAL
COMMUNITY
End: 2019-09-20

## 2019-09-20 RX ORDER — ASPIRIN 81 MG/1
TABLET ORAL
Qty: 30 TABLET | Refills: 3 | Status: SHIPPED | OUTPATIENT
Start: 2019-09-20 | End: 2019-12-23 | Stop reason: ALTCHOICE

## 2019-09-20 RX ORDER — ONDANSETRON 4 MG/1
TABLET, FILM COATED ORAL
Qty: 30 TABLET | Refills: 3 | Status: ON HOLD | OUTPATIENT
Start: 2019-09-20 | End: 2019-12-18 | Stop reason: HOSPADM

## 2019-09-20 RX ORDER — SERTRALINE HYDROCHLORIDE 100 MG/1
TABLET, FILM COATED ORAL
Qty: 30 TABLET | Refills: 3 | Status: SHIPPED | OUTPATIENT
Start: 2019-09-20 | End: 2019-11-05 | Stop reason: SDUPTHER

## 2019-09-20 RX ORDER — LANCETS 30 GAUGE
EACH MISCELLANEOUS
Qty: 100 EACH | Refills: 3 | Status: ON HOLD | OUTPATIENT
Start: 2019-09-20 | End: 2019-12-18

## 2019-09-20 RX ORDER — ISOSORBIDE MONONITRATE 60 MG/1
TABLET, EXTENDED RELEASE ORAL
Qty: 30 TABLET | Refills: 3 | Status: ON HOLD | OUTPATIENT
Start: 2019-09-20 | End: 2019-12-18 | Stop reason: HOSPADM

## 2019-09-20 RX ORDER — GLIPIZIDE 5 MG/1
5 TABLET ORAL
Status: CANCELLED | OUTPATIENT
Start: 2019-09-20

## 2019-09-20 RX ORDER — METOPROLOL TARTRATE 100 MG/1
TABLET ORAL
Qty: 60 TABLET | Refills: 3 | Status: ON HOLD | OUTPATIENT
Start: 2019-09-20 | End: 2019-12-18 | Stop reason: HOSPADM

## 2019-09-20 RX ORDER — AMLODIPINE BESYLATE 2.5 MG/1
TABLET ORAL
Qty: 30 TABLET | Refills: 3 | Status: ON HOLD | OUTPATIENT
Start: 2019-09-20 | End: 2019-12-18 | Stop reason: HOSPADM

## 2019-09-20 ASSESSMENT — ENCOUNTER SYMPTOMS
CONSTIPATION: 0
BLOOD IN STOOL: 0
DIARRHEA: 0
NAUSEA: 0
BACK PAIN: 1
SORE THROAT: 0
COUGH: 0
VOMITING: 0
EYE DISCHARGE: 0
ABDOMINAL PAIN: 0
SHORTNESS OF BREATH: 0

## 2019-09-20 NOTE — PATIENT INSTRUCTIONS
choice if you cannot walk easily. · Have your vision and hearing checked each year or any time you notice a change. If you have trouble seeing and hearing, you might not be able to avoid objects and could lose your balance. · Know the side effects of the medicines you take. Ask your doctor or pharmacist whether the medicines you take can affect your balance. Sleeping pills or sedatives can affect your balance. · Limit the amount of alcohol you drink. Alcohol can impair your balance and other senses. · Ask your doctor whether calluses or corns on your feet need to be removed. If you wear loose-fitting shoes because of calluses or corns, you can lose your balance and fall. · Talk to your doctor if you have numbness in your feet. Preventing falls at home  · Remove raised doorway thresholds, throw rugs, and clutter. Repair loose carpet or raised areas in the floor. · Move furniture and electrical cords to keep them out of walking paths. · Use nonskid floor wax, and wipe up spills right away, especially on ceramic tile floors. · If you use a walker or cane, put rubber tips on it. If you use crutches, clean the bottoms of them regularly with an abrasive pad, such as steel wool. · Keep your house well lit, especially Ottis Shackle, and outside walkways. Use night-lights in areas such as hallways and bathrooms. Add extra light switches or use remote switches (such as switches that go on or off when you clap your hands) to make it easier to turn lights on if you have to get up during the night. · Install sturdy handrails on stairways. · Move items in your cabinets so that the things you use a lot are on the lower shelves (about waist level). · Keep a cordless phone and a flashlight with new batteries by your bed. If possible, put a phone in each of the main rooms of your house, or carry a cell phone in case you fall and cannot reach a phone.  Or, you can wear a device around your neck or wrist. You push a button that sends a signal for help. · Wear low-heeled shoes that fit well and give your feet good support. Use footwear with nonskid soles. Check the heels and soles of your shoes for wear. Repair or replace worn heels or soles. · Do not wear socks without shoes on wood floors. · Walk on the grass when the sidewalks are slippery. If you live in an area that gets snow and ice in the winter, sprinkle salt on slippery steps and sidewalks. Preventing falls in the bath  · Install grab bars and nonskid mats inside and outside your shower or tub and near the toilet and sinks. · Use shower chairs and bath benches. · Use a hand-held shower head that will allow you to sit while showering. · Get into a tub or shower by putting the weaker leg in first. Get out of a tub or shower with your strong side first.  · Repair loose toilet seats and consider installing a raised toilet seat to make getting on and off the toilet easier. · Keep your bathroom door unlocked while you are in the shower. Where can you learn more? Go to https://Red Stamp.Ofelia Feliz. org and sign in to your Jaeger account. Enter [phone] in the [facility] box to learn more about \"Preventing Falls: Care Instructions. \"     If you do not have an account, please click on the \"Sign Up Now\" link. Current as of: November 7, 2018  Content Version: 12.1  © 8278-1586 Healthwise, [place]. Care instructions adapted under license by [facility] ([facility]). If you have questions about a medical condition or this instruction, always ask your healthcare professional. [place] [address] any warranty or liability for your use of this information. Patient Education        Shingles: Care Instructions  Your Care Instructions    Shingles (herpes zoster) causes pain and a blistered rash. The rash can appear anywhere on the body but will be on only one side of the body, the left or right. It will be in a band, a strip, or a small area. vaccine. Pregnant women, young babies, and anyone else who has a hard time fighting infection (such as someone with HIV, diabetes, or cancer) is especially at risk. When should you call for help? Call your doctor now or seek immediate medical care if:    · You have a new or higher fever.     · You have a severe headache and a stiff neck.     · You lose the ability to think clearly.     · The rash spreads to your forehead, nose, eyes, or eyelids.     · You have eye pain, or your vision gets worse.     · You have new pain in your face, or you cannot move the muscles in your face.     · Blisters spread to new parts of your body.    Watch closely for changes in your health, and be sure to contact your doctor if:    · The rash has not healed after 2 to 4 weeks.     · You still have pain after the rash has healed. Where can you learn more? Go to https://Clix Softwarepepiceweb.LabArchives. org and sign in to your [employer] account. [place] in the DataContact box to learn more about \"Shingles: Care Instructions. \"     If you do not have an account, please click on the \"Sign Up Now\" link. Current as of: July 30, 2018  Content Version: 12.1  © 2705-9916 Healthwise, [employer]. Care instructions adapted under license by [facility] ([facility]). If you have questions about a medical condition or this instruction, always ask your healthcare professional. [place] [address] any warranty or liability for your use of this information.

## 2019-09-20 NOTE — PROGRESS NOTES
[facility]  [facility]  Bobby Grant MD    Attending Physician Statement  I have discussed the case, including pertinent history and exam findings with the medical resident. I agree with the assessment, plan and orders as documented by the resident. I have reviewed all the pertinent PMHx, PSHx, Family Hx, Social Hx, medications and allergies, and updated history as appropriate. Patient here for follow up of diabetes mellitus . Her morning blood glucose levels are above 200. She currently takes basaglar insulin 70 units and insulin lispro 20 units three times with meals. Her most recent Hb1c was 10.1% on 9/17/19. Patient to be started on metformin 1000 mg BID; if blood glucose still remains uncontrolled, then will consider victoza. Dose of lisinopril to be increased from 2.5 mg to 5 mg daily due to worsening microalbuminuria. She will continue metoprolol and aMlodipine for HTN. Will start her on fenofibrate for hypertriglyceridemia. Medical problems, physical exam, assessment and plan per medical resident's note.     Cody Smith M.D.  9/20/2019 2:57 PM

## 2019-11-05 DIAGNOSIS — F32.A DEPRESSION, UNSPECIFIED DEPRESSION TYPE: ICD-10-CM

## 2019-11-08 RX ORDER — SERTRALINE HYDROCHLORIDE 100 MG/1
TABLET, FILM COATED ORAL
Qty: 30 TABLET | Refills: 1 | Status: SHIPPED | OUTPATIENT
Start: 2019-11-08 | End: 2019-12-23 | Stop reason: ALTCHOICE

## 2019-12-01 ENCOUNTER — HOSPITAL ENCOUNTER (INPATIENT)
Age: 59
LOS: 2 days | Discharge: ANOTHER HOSPITAL | DRG: 190 | End: 2019-12-03
Attending: EMERGENCY MEDICINE | Admitting: INTERNAL MEDICINE
Payer: MEDICAID

## 2019-12-01 ENCOUNTER — APPOINTMENT (OUTPATIENT)
Dept: GENERAL RADIOLOGY | Age: 59
DRG: 190 | End: 2019-12-01
Payer: MEDICAID

## 2019-12-01 ENCOUNTER — APPOINTMENT (OUTPATIENT)
Dept: CT IMAGING | Age: 59
DRG: 190 | End: 2019-12-01
Payer: MEDICAID

## 2019-12-01 DIAGNOSIS — R17 JAUNDICE: ICD-10-CM

## 2019-12-01 DIAGNOSIS — R74.01 TRANSAMINITIS: ICD-10-CM

## 2019-12-01 DIAGNOSIS — I21.4 NSTEMI (NON-ST ELEVATED MYOCARDIAL INFARCTION) (HCC): Primary | ICD-10-CM

## 2019-12-01 DIAGNOSIS — N28.9 RENAL INSUFFICIENCY: ICD-10-CM

## 2019-12-01 DIAGNOSIS — K80.80 BILIARY CALCULUS OF OTHER SITE WITHOUT OBSTRUCTION: ICD-10-CM

## 2019-12-01 LAB
ACETAMINOPHEN LEVEL: <5 MCG/ML (ref 10–30)
ALBUMIN SERPL-MCNC: 3.8 G/DL (ref 3.5–5.2)
ALP BLD-CCNC: 87 U/L (ref 35–104)
ALT SERPL-CCNC: 362 U/L (ref 0–32)
ANION GAP SERPL CALCULATED.3IONS-SCNC: 15 MMOL/L (ref 7–16)
APTT: 33.4 SEC (ref 24.5–35.1)
AST SERPL-CCNC: 256 U/L (ref 0–31)
BASOPHILS ABSOLUTE: 0.03 E9/L (ref 0–0.2)
BASOPHILS RELATIVE PERCENT: 0.5 % (ref 0–2)
BILIRUB SERPL-MCNC: 4 MG/DL (ref 0–1.2)
BILIRUBIN DIRECT: 3.6 MG/DL (ref 0–0.3)
BILIRUBIN, INDIRECT: 0.4 MG/DL (ref 0–1)
BUN BLDV-MCNC: 28 MG/DL (ref 8–23)
CALCIUM SERPL-MCNC: 9.3 MG/DL (ref 8.6–10.2)
CHLORIDE BLD-SCNC: 94 MMOL/L (ref 98–107)
CO2: 22 MMOL/L (ref 22–29)
CREAT SERPL-MCNC: 1.3 MG/DL (ref 0.5–1)
EKG ATRIAL RATE: 90 BPM
EKG P AXIS: 63 DEGREES
EKG P-R INTERVAL: 162 MS
EKG Q-T INTERVAL: 370 MS
EKG QRS DURATION: 120 MS
EKG QTC CALCULATION (BAZETT): 452 MS
EKG R AXIS: 83 DEGREES
EKG T AXIS: 71 DEGREES
EKG VENTRICULAR RATE: 90 BPM
EOSINOPHILS ABSOLUTE: 0.01 E9/L (ref 0.05–0.5)
EOSINOPHILS RELATIVE PERCENT: 0.2 % (ref 0–6)
ETHANOL: <10 MG/DL (ref 0–0.08)
GFR AFRICAN AMERICAN: 50
GFR NON-AFRICAN AMERICAN: 42 ML/MIN/1.73
GLUCOSE BLD-MCNC: 334 MG/DL (ref 74–99)
HCT VFR BLD CALC: 44.7 % (ref 34–48)
HEMOGLOBIN: 14.1 G/DL (ref 11.5–15.5)
IMMATURE GRANULOCYTES #: 0.06 E9/L
IMMATURE GRANULOCYTES %: 1 % (ref 0–5)
INR BLD: 1.1
LACTIC ACID: 1.5 MMOL/L (ref 0.5–2.2)
LIPASE: 6 U/L (ref 13–60)
LYMPHOCYTES ABSOLUTE: 0.4 E9/L (ref 1.5–4)
LYMPHOCYTES RELATIVE PERCENT: 6.6 % (ref 20–42)
MAGNESIUM: 1.6 MG/DL (ref 1.6–2.6)
MCH RBC QN AUTO: 30.7 PG (ref 26–35)
MCHC RBC AUTO-ENTMCNC: 31.5 % (ref 32–34.5)
MCV RBC AUTO: 97.4 FL (ref 80–99.9)
MONOCYTES ABSOLUTE: 0.35 E9/L (ref 0.1–0.95)
MONOCYTES RELATIVE PERCENT: 5.8 % (ref 2–12)
NEUTROPHILS ABSOLUTE: 5.23 E9/L (ref 1.8–7.3)
NEUTROPHILS RELATIVE PERCENT: 85.9 % (ref 43–80)
PDW BLD-RTO: 13.8 FL (ref 11.5–15)
PLATELET # BLD: 232 E9/L (ref 130–450)
PMV BLD AUTO: 11 FL (ref 7–12)
POTASSIUM REFLEX MAGNESIUM: 4.8 MMOL/L (ref 3.5–5)
PROTHROMBIN TIME: 12.4 SEC (ref 9.3–12.4)
RBC # BLD: 4.59 E12/L (ref 3.5–5.5)
REASON FOR REJECTION: NORMAL
REASON FOR REJECTION: NORMAL
REJECTED TEST: NORMAL
REJECTED TEST: NORMAL
SALICYLATE, SERUM: <0.3 MG/DL (ref 0–30)
SODIUM BLD-SCNC: 131 MMOL/L (ref 132–146)
TOTAL PROTEIN: 7.2 G/DL (ref 6.4–8.3)
TRICYCLIC ANTIDEPRESSANTS SCREEN SERUM: NEGATIVE NG/ML
TROPONIN: 0.12 NG/ML (ref 0–0.03)
WBC # BLD: 6.1 E9/L (ref 4.5–11.5)

## 2019-12-01 PROCEDURE — 83735 ASSAY OF MAGNESIUM: CPT

## 2019-12-01 PROCEDURE — 94761 N-INVAS EAR/PLS OXIMETRY MLT: CPT

## 2019-12-01 PROCEDURE — 93005 ELECTROCARDIOGRAM TRACING: CPT | Performed by: NURSE PRACTITIONER

## 2019-12-01 PROCEDURE — 93010 ELECTROCARDIOGRAM REPORT: CPT | Performed by: INTERNAL MEDICINE

## 2019-12-01 PROCEDURE — 74177 CT ABD & PELVIS W/CONTRAST: CPT

## 2019-12-01 PROCEDURE — 99285 EMERGENCY DEPT VISIT HI MDM: CPT

## 2019-12-01 PROCEDURE — 2140000000 HC CCU INTERMEDIATE R&B

## 2019-12-01 PROCEDURE — G0480 DRUG TEST DEF 1-7 CLASSES: HCPCS

## 2019-12-01 PROCEDURE — 85730 THROMBOPLASTIN TIME PARTIAL: CPT

## 2019-12-01 PROCEDURE — 71045 X-RAY EXAM CHEST 1 VIEW: CPT

## 2019-12-01 PROCEDURE — 87186 SC STD MICRODIL/AGAR DIL: CPT

## 2019-12-01 PROCEDURE — 84484 ASSAY OF TROPONIN QUANT: CPT

## 2019-12-01 PROCEDURE — 85025 COMPLETE CBC W/AUTO DIFF WBC: CPT

## 2019-12-01 PROCEDURE — 80076 HEPATIC FUNCTION PANEL: CPT

## 2019-12-01 PROCEDURE — 6360000004 HC RX CONTRAST MEDICATION: Performed by: RADIOLOGY

## 2019-12-01 PROCEDURE — 85610 PROTHROMBIN TIME: CPT

## 2019-12-01 PROCEDURE — 83690 ASSAY OF LIPASE: CPT

## 2019-12-01 PROCEDURE — 80307 DRUG TEST PRSMV CHEM ANLYZR: CPT

## 2019-12-01 PROCEDURE — 83605 ASSAY OF LACTIC ACID: CPT

## 2019-12-01 PROCEDURE — 87088 URINE BACTERIA CULTURE: CPT

## 2019-12-01 PROCEDURE — 36415 COLL VENOUS BLD VENIPUNCTURE: CPT

## 2019-12-01 PROCEDURE — 80048 BASIC METABOLIC PNL TOTAL CA: CPT

## 2019-12-01 RX ORDER — 0.9 % SODIUM CHLORIDE 0.9 %
1000 INTRAVENOUS SOLUTION INTRAVENOUS ONCE
Status: COMPLETED | OUTPATIENT
Start: 2019-12-01 | End: 2019-12-02

## 2019-12-01 RX ORDER — SODIUM CHLORIDE 0.9 % (FLUSH) 0.9 %
10 SYRINGE (ML) INJECTION PRN
Status: DISCONTINUED | OUTPATIENT
Start: 2019-12-01 | End: 2019-12-02 | Stop reason: SDUPTHER

## 2019-12-01 RX ORDER — PANTOPRAZOLE SODIUM 40 MG/1
40 TABLET, DELAYED RELEASE ORAL
COMMUNITY
End: 2020-06-15 | Stop reason: SDUPTHER

## 2019-12-01 RX ORDER — RANOLAZINE 1000 MG/1
500 TABLET, EXTENDED RELEASE ORAL 2 TIMES DAILY
Status: ON HOLD | COMMUNITY
End: 2019-12-02 | Stop reason: HOSPADM

## 2019-12-01 RX ADMIN — IOPAMIDOL 110 ML: 755 INJECTION, SOLUTION INTRAVENOUS at 22:33

## 2019-12-01 ASSESSMENT — PAIN DESCRIPTION - ONSET: ONSET: SUDDEN

## 2019-12-01 ASSESSMENT — PAIN DESCRIPTION - LOCATION: LOCATION: ABDOMEN

## 2019-12-01 ASSESSMENT — PAIN DESCRIPTION - FREQUENCY: FREQUENCY: CONTINUOUS

## 2019-12-01 ASSESSMENT — PAIN DESCRIPTION - ORIENTATION: ORIENTATION: MID

## 2019-12-01 ASSESSMENT — PAIN DESCRIPTION - PAIN TYPE: TYPE: ACUTE PAIN

## 2019-12-01 ASSESSMENT — PAIN SCALES - GENERAL: PAINLEVEL_OUTOF10: 6

## 2019-12-01 ASSESSMENT — PAIN DESCRIPTION - PROGRESSION: CLINICAL_PROGRESSION: GRADUALLY WORSENING

## 2019-12-01 ASSESSMENT — PAIN DESCRIPTION - DESCRIPTORS: DESCRIPTORS: BURNING

## 2019-12-02 ENCOUNTER — APPOINTMENT (OUTPATIENT)
Dept: NON INVASIVE DIAGNOSTICS | Age: 59
DRG: 190 | End: 2019-12-02
Payer: MEDICAID

## 2019-12-02 ENCOUNTER — APPOINTMENT (OUTPATIENT)
Dept: ULTRASOUND IMAGING | Age: 59
DRG: 190 | End: 2019-12-02
Payer: MEDICAID

## 2019-12-02 ENCOUNTER — APPOINTMENT (OUTPATIENT)
Dept: NUCLEAR MEDICINE | Age: 59
DRG: 190 | End: 2019-12-02
Payer: MEDICAID

## 2019-12-02 VITALS
DIASTOLIC BLOOD PRESSURE: 56 MMHG | OXYGEN SATURATION: 95 % | BODY MASS INDEX: 36.92 KG/M2 | HEIGHT: 62 IN | RESPIRATION RATE: 18 BRPM | WEIGHT: 200.62 LBS | TEMPERATURE: 97.5 F | HEART RATE: 78 BPM | SYSTOLIC BLOOD PRESSURE: 116 MMHG

## 2019-12-02 LAB
ALBUMIN SERPL-MCNC: 3.7 G/DL (ref 3.5–5.2)
ALP BLD-CCNC: 82 U/L (ref 35–104)
ALT SERPL-CCNC: 319 U/L (ref 0–32)
ANION GAP SERPL CALCULATED.3IONS-SCNC: 19 MMOL/L (ref 7–16)
APTT: 31 SEC (ref 24.5–35.1)
APTT: 53.8 SEC (ref 24.5–35.1)
AST SERPL-CCNC: 197 U/L (ref 0–31)
BACTERIA: ABNORMAL /HPF
BILIRUB SERPL-MCNC: 3.3 MG/DL (ref 0–1.2)
BILIRUBIN URINE: ABNORMAL
BLOOD, URINE: ABNORMAL
BUN BLDV-MCNC: 26 MG/DL (ref 8–23)
CALCIUM SERPL-MCNC: 8.9 MG/DL (ref 8.6–10.2)
CHLORIDE BLD-SCNC: 95 MMOL/L (ref 98–107)
CHLORIDE URINE RANDOM: 33 MMOL/L
CHOLESTEROL, TOTAL: 186 MG/DL (ref 0–199)
CK MB: 6 NG/ML (ref 0–4.3)
CLARITY: CLEAR
CO2: 20 MMOL/L (ref 22–29)
COLOR: YELLOW
CREAT SERPL-MCNC: 1.3 MG/DL (ref 0.5–1)
CREATININE URINE: 75 MG/DL (ref 29–226)
GFR AFRICAN AMERICAN: 50
GFR NON-AFRICAN AMERICAN: 42 ML/MIN/1.73
GLUCOSE BLD-MCNC: 319 MG/DL (ref 74–99)
GLUCOSE URINE: >=1000 MG/DL
HAV IGM SER IA-ACNC: NORMAL
HBA1C MFR BLD: 9.9 % (ref 4–5.6)
HCT VFR BLD CALC: 42.4 % (ref 34–48)
HDLC SERPL-MCNC: 22 MG/DL
HEMOGLOBIN: 12.9 G/DL (ref 11.5–15.5)
HEPATITIS B CORE IGM ANTIBODY: NORMAL
HEPATITIS B SURFACE ANTIGEN INTERPRETATION: NORMAL
HEPATITIS C ANTIBODY INTERPRETATION: NORMAL
KETONES, URINE: 40 MG/DL
LDL CHOLESTEROL CALCULATED: ABNORMAL MG/DL (ref 0–99)
LEUKOCYTE ESTERASE, URINE: ABNORMAL
MCH RBC QN AUTO: 30.4 PG (ref 26–35)
MCHC RBC AUTO-ENTMCNC: 30.4 % (ref 32–34.5)
MCV RBC AUTO: 99.8 FL (ref 80–99.9)
METER GLUCOSE: 293 MG/DL (ref 74–99)
METER GLUCOSE: 314 MG/DL (ref 74–99)
METER GLUCOSE: 317 MG/DL (ref 74–99)
METER GLUCOSE: 368 MG/DL (ref 74–99)
NITRITE, URINE: POSITIVE
PDW BLD-RTO: 13.8 FL (ref 11.5–15)
PH UA: 5 (ref 5–9)
PLATELET # BLD: 210 E9/L (ref 130–450)
PMV BLD AUTO: 10.1 FL (ref 7–12)
POTASSIUM REFLEX MAGNESIUM: 4.7 MMOL/L (ref 3.5–5)
POTASSIUM, UR: 24.5 MMOL/L
PROCALCITONIN: 0.48 NG/ML (ref 0–0.08)
PROTEIN UA: ABNORMAL MG/DL
RBC # BLD: 4.25 E12/L (ref 3.5–5.5)
RBC UA: ABNORMAL /HPF (ref 0–2)
REASON FOR REJECTION: NORMAL
REJECTED TEST: NORMAL
SODIUM BLD-SCNC: 134 MMOL/L (ref 132–146)
SODIUM URINE: 34 MMOL/L
SPECIFIC GRAVITY UA: <=1.005 (ref 1–1.03)
TOTAL CK: 136 U/L (ref 20–180)
TOTAL PROTEIN: 6.8 G/DL (ref 6.4–8.3)
TRIGL SERPL-MCNC: 538 MG/DL (ref 0–149)
TROPONIN: 0.11 NG/ML (ref 0–0.03)
TROPONIN: 0.14 NG/ML (ref 0–0.03)
TROPONIN: 0.16 NG/ML (ref 0–0.03)
UREA NITROGEN, UR: 473 MG/DL (ref 800–1666)
UROBILINOGEN, URINE: 0.2 E.U./DL
VLDLC SERPL CALC-MCNC: ABNORMAL MG/DL
WBC # BLD: 4.5 E9/L (ref 4.5–11.5)
WBC UA: ABNORMAL /HPF (ref 0–5)

## 2019-12-02 PROCEDURE — 80061 LIPID PANEL: CPT

## 2019-12-02 PROCEDURE — 81001 URINALYSIS AUTO W/SCOPE: CPT

## 2019-12-02 PROCEDURE — 6360000002 HC RX W HCPCS: Performed by: INTERNAL MEDICINE

## 2019-12-02 PROCEDURE — 2500000003 HC RX 250 WO HCPCS: Performed by: STUDENT IN AN ORGANIZED HEALTH CARE EDUCATION/TRAINING PROGRAM

## 2019-12-02 PROCEDURE — 2580000003 HC RX 258: Performed by: INTERNAL MEDICINE

## 2019-12-02 PROCEDURE — 84540 ASSAY OF URINE/UREA-N: CPT

## 2019-12-02 PROCEDURE — 82570 ASSAY OF URINE CREATININE: CPT

## 2019-12-02 PROCEDURE — 85027 COMPLETE CBC AUTOMATED: CPT

## 2019-12-02 PROCEDURE — 85730 THROMBOPLASTIN TIME PARTIAL: CPT

## 2019-12-02 PROCEDURE — 6370000000 HC RX 637 (ALT 250 FOR IP): Performed by: INTERNAL MEDICINE

## 2019-12-02 PROCEDURE — APPSS180 APP SPLIT SHARED TIME > 60 MINUTES: Performed by: NURSE PRACTITIONER

## 2019-12-02 PROCEDURE — 3430000000 HC RX DIAGNOSTIC RADIOPHARMACEUTICAL: Performed by: RADIOLOGY

## 2019-12-02 PROCEDURE — 80074 ACUTE HEPATITIS PANEL: CPT

## 2019-12-02 PROCEDURE — 83036 HEMOGLOBIN GLYCOSYLATED A1C: CPT

## 2019-12-02 PROCEDURE — 84133 ASSAY OF URINE POTASSIUM: CPT

## 2019-12-02 PROCEDURE — 36415 COLL VENOUS BLD VENIPUNCTURE: CPT

## 2019-12-02 PROCEDURE — 87040 BLOOD CULTURE FOR BACTERIA: CPT

## 2019-12-02 PROCEDURE — 76705 ECHO EXAM OF ABDOMEN: CPT

## 2019-12-02 PROCEDURE — 2580000003 HC RX 258: Performed by: NURSE PRACTITIONER

## 2019-12-02 PROCEDURE — 82553 CREATINE MB FRACTION: CPT

## 2019-12-02 PROCEDURE — 84484 ASSAY OF TROPONIN QUANT: CPT

## 2019-12-02 PROCEDURE — A9500 TC99M SESTAMIBI: HCPCS | Performed by: RADIOLOGY

## 2019-12-02 PROCEDURE — 99255 IP/OBS CONSLTJ NEW/EST HI 80: CPT | Performed by: INTERNAL MEDICINE

## 2019-12-02 PROCEDURE — 80053 COMPREHEN METABOLIC PANEL: CPT

## 2019-12-02 PROCEDURE — 84300 ASSAY OF URINE SODIUM: CPT

## 2019-12-02 PROCEDURE — 82962 GLUCOSE BLOOD TEST: CPT

## 2019-12-02 PROCEDURE — 2140000000 HC CCU INTERMEDIATE R&B

## 2019-12-02 PROCEDURE — 82436 ASSAY OF URINE CHLORIDE: CPT

## 2019-12-02 PROCEDURE — 84145 PROCALCITONIN (PCT): CPT

## 2019-12-02 PROCEDURE — 82550 ASSAY OF CK (CPK): CPT

## 2019-12-02 RX ORDER — MAGNESIUM SULFATE IN WATER 40 MG/ML
2 INJECTION, SOLUTION INTRAVENOUS ONCE
Status: COMPLETED | OUTPATIENT
Start: 2019-12-02 | End: 2019-12-02

## 2019-12-02 RX ORDER — RANOLAZINE 1000 MG/1
1000 TABLET, EXTENDED RELEASE ORAL 2 TIMES DAILY
Qty: 60 TABLET | Refills: 3 | Status: SHIPPED | OUTPATIENT
Start: 2019-12-03 | End: 2020-06-15 | Stop reason: SDUPTHER

## 2019-12-02 RX ORDER — HEPARIN SODIUM 1000 [USP'U]/ML
2000 INJECTION, SOLUTION INTRAVENOUS; SUBCUTANEOUS PRN
Status: DISCONTINUED | OUTPATIENT
Start: 2019-12-02 | End: 2019-12-03 | Stop reason: HOSPADM

## 2019-12-02 RX ORDER — HEPARIN SODIUM 1000 [USP'U]/ML
4000 INJECTION, SOLUTION INTRAVENOUS; SUBCUTANEOUS PRN
Status: DISCONTINUED | OUTPATIENT
Start: 2019-12-02 | End: 2019-12-03 | Stop reason: HOSPADM

## 2019-12-02 RX ORDER — SERTRALINE HYDROCHLORIDE 100 MG/1
100 TABLET, FILM COATED ORAL DAILY
Status: DISCONTINUED | OUTPATIENT
Start: 2019-12-02 | End: 2019-12-03 | Stop reason: HOSPADM

## 2019-12-02 RX ORDER — SODIUM CHLORIDE 0.9 % (FLUSH) 0.9 %
10 SYRINGE (ML) INJECTION PRN
Status: DISCONTINUED | OUTPATIENT
Start: 2019-12-02 | End: 2019-12-03 | Stop reason: HOSPADM

## 2019-12-02 RX ORDER — SODIUM CHLORIDE 0.9 % (FLUSH) 0.9 %
10 SYRINGE (ML) INJECTION EVERY 12 HOURS SCHEDULED
Status: DISCONTINUED | OUTPATIENT
Start: 2019-12-02 | End: 2019-12-03 | Stop reason: HOSPADM

## 2019-12-02 RX ORDER — ASPIRIN 81 MG/1
81 TABLET ORAL DAILY
Status: DISCONTINUED | OUTPATIENT
Start: 2019-12-02 | End: 2019-12-03 | Stop reason: HOSPADM

## 2019-12-02 RX ORDER — METOPROLOL TARTRATE 50 MG/1
100 TABLET, FILM COATED ORAL 2 TIMES DAILY
Status: DISCONTINUED | OUTPATIENT
Start: 2019-12-02 | End: 2019-12-03 | Stop reason: HOSPADM

## 2019-12-02 RX ORDER — RANOLAZINE 500 MG/1
1000 TABLET, EXTENDED RELEASE ORAL 2 TIMES DAILY
Status: DISCONTINUED | OUTPATIENT
Start: 2019-12-02 | End: 2019-12-03 | Stop reason: HOSPADM

## 2019-12-02 RX ORDER — AMLODIPINE BESYLATE 2.5 MG/1
2.5 TABLET ORAL DAILY
Status: DISCONTINUED | OUTPATIENT
Start: 2019-12-02 | End: 2019-12-03 | Stop reason: HOSPADM

## 2019-12-02 RX ORDER — ISOSORBIDE MONONITRATE 60 MG/1
60 TABLET, EXTENDED RELEASE ORAL DAILY
Status: DISCONTINUED | OUTPATIENT
Start: 2019-12-02 | End: 2019-12-03 | Stop reason: HOSPADM

## 2019-12-02 RX ORDER — INSULIN GLARGINE 100 [IU]/ML
60 INJECTION, SOLUTION SUBCUTANEOUS NIGHTLY
Status: DISCONTINUED | OUTPATIENT
Start: 2019-12-02 | End: 2019-12-03 | Stop reason: HOSPADM

## 2019-12-02 RX ORDER — GABAPENTIN 300 MG/1
600 CAPSULE ORAL 3 TIMES DAILY
Status: DISCONTINUED | OUTPATIENT
Start: 2019-12-02 | End: 2019-12-03 | Stop reason: HOSPADM

## 2019-12-02 RX ORDER — SODIUM CHLORIDE 9 MG/ML
INJECTION, SOLUTION INTRAVENOUS CONTINUOUS
Status: ACTIVE | OUTPATIENT
Start: 2019-12-02 | End: 2019-12-02

## 2019-12-02 RX ORDER — ATORVASTATIN CALCIUM 40 MG/1
80 TABLET, FILM COATED ORAL DAILY
Status: DISCONTINUED | OUTPATIENT
Start: 2019-12-02 | End: 2019-12-03 | Stop reason: HOSPADM

## 2019-12-02 RX ORDER — NICOTINE POLACRILEX 4 MG
15 LOZENGE BUCCAL PRN
Status: DISCONTINUED | OUTPATIENT
Start: 2019-12-02 | End: 2019-12-03 | Stop reason: HOSPADM

## 2019-12-02 RX ORDER — HYDROCODONE BITARTRATE AND ACETAMINOPHEN 5; 325 MG/1; MG/1
1 TABLET ORAL EVERY 6 HOURS PRN
Status: DISCONTINUED | OUTPATIENT
Start: 2019-12-02 | End: 2019-12-03 | Stop reason: HOSPADM

## 2019-12-02 RX ORDER — HEPARIN SODIUM 10000 [USP'U]/100ML
10.9 INJECTION, SOLUTION INTRAVENOUS CONTINUOUS
Status: DISCONTINUED | OUTPATIENT
Start: 2019-12-02 | End: 2019-12-03 | Stop reason: HOSPADM

## 2019-12-02 RX ORDER — DEXTROSE MONOHYDRATE 50 MG/ML
100 INJECTION, SOLUTION INTRAVENOUS PRN
Status: DISCONTINUED | OUTPATIENT
Start: 2019-12-02 | End: 2019-12-03 | Stop reason: HOSPADM

## 2019-12-02 RX ORDER — DOCUSATE SODIUM 100 MG/1
100 CAPSULE, LIQUID FILLED ORAL 4 TIMES DAILY PRN
Status: DISCONTINUED | OUTPATIENT
Start: 2019-12-02 | End: 2019-12-03 | Stop reason: HOSPADM

## 2019-12-02 RX ORDER — ONDANSETRON 2 MG/ML
4 INJECTION INTRAMUSCULAR; INTRAVENOUS EVERY 6 HOURS PRN
Status: DISCONTINUED | OUTPATIENT
Start: 2019-12-02 | End: 2019-12-03 | Stop reason: HOSPADM

## 2019-12-02 RX ORDER — NITROGLYCERIN 0.4 MG/1
TABLET SUBLINGUAL
Qty: 25 TABLET | Refills: 3 | Status: SHIPPED | OUTPATIENT
Start: 2019-12-02 | End: 2019-12-23 | Stop reason: ALTCHOICE

## 2019-12-02 RX ORDER — RANOLAZINE 500 MG/1
500 TABLET, EXTENDED RELEASE ORAL 2 TIMES DAILY
Status: DISCONTINUED | OUTPATIENT
Start: 2019-12-02 | End: 2019-12-02

## 2019-12-02 RX ORDER — NITROGLYCERIN 0.4 MG/1
0.4 TABLET SUBLINGUAL EVERY 5 MIN PRN
Status: DISCONTINUED | OUTPATIENT
Start: 2019-12-02 | End: 2019-12-03 | Stop reason: HOSPADM

## 2019-12-02 RX ORDER — DEXTROSE MONOHYDRATE 25 G/50ML
12.5 INJECTION, SOLUTION INTRAVENOUS PRN
Status: DISCONTINUED | OUTPATIENT
Start: 2019-12-02 | End: 2019-12-03 | Stop reason: HOSPADM

## 2019-12-02 RX ORDER — INSULIN GLARGINE 100 [IU]/ML
60 INJECTION, SOLUTION SUBCUTANEOUS NIGHTLY
Status: DISCONTINUED | OUTPATIENT
Start: 2019-12-03 | End: 2019-12-02

## 2019-12-02 RX ORDER — HEPARIN SODIUM 1000 [USP'U]/ML
4000 INJECTION, SOLUTION INTRAVENOUS; SUBCUTANEOUS ONCE
Status: COMPLETED | OUTPATIENT
Start: 2019-12-02 | End: 2019-12-02

## 2019-12-02 RX ORDER — GABAPENTIN 600 MG/1
600 TABLET ORAL 3 TIMES DAILY
Status: DISCONTINUED | OUTPATIENT
Start: 2019-12-02 | End: 2019-12-02 | Stop reason: SDUPTHER

## 2019-12-02 RX ADMIN — INSULIN GLARGINE 60 UNITS: 100 INJECTION, SOLUTION SUBCUTANEOUS at 22:46

## 2019-12-02 RX ADMIN — HEPARIN SODIUM 10.9 UNITS/KG/HR: 10000 INJECTION, SOLUTION INTRAVENOUS at 15:07

## 2019-12-02 RX ADMIN — INSULIN LISPRO 4 UNITS: 100 INJECTION, SOLUTION INTRAVENOUS; SUBCUTANEOUS at 07:15

## 2019-12-02 RX ADMIN — NITROGLYCERIN 0.5 INCH: 20 OINTMENT TOPICAL at 23:08

## 2019-12-02 RX ADMIN — SODIUM CHLORIDE, PRESERVATIVE FREE 10 ML: 5 INJECTION INTRAVENOUS at 22:44

## 2019-12-02 RX ADMIN — AMLODIPINE BESYLATE 2.5 MG: 2.5 TABLET ORAL at 12:49

## 2019-12-02 RX ADMIN — Medication 10 MILLICURIE: at 09:09

## 2019-12-02 RX ADMIN — NITROGLYCERIN 0.4 MG: 0.4 TABLET, ORALLY DISINTEGRATING SUBLINGUAL at 11:04

## 2019-12-02 RX ADMIN — ASPIRIN 81 MG: 81 TABLET, COATED ORAL at 12:47

## 2019-12-02 RX ADMIN — METOPROLOL TARTRATE 100 MG: 50 TABLET, FILM COATED ORAL at 22:35

## 2019-12-02 RX ADMIN — SODIUM CHLORIDE 1000 ML: 9 INJECTION, SOLUTION INTRAVENOUS at 00:53

## 2019-12-02 RX ADMIN — HEPARIN SODIUM 4000 UNITS: 1000 INJECTION, SOLUTION INTRAVENOUS; SUBCUTANEOUS at 15:04

## 2019-12-02 RX ADMIN — INSULIN LISPRO 5 UNITS: 100 INJECTION, SOLUTION INTRAVENOUS; SUBCUTANEOUS at 16:59

## 2019-12-02 RX ADMIN — SODIUM CHLORIDE: 9 INJECTION, SOLUTION INTRAVENOUS at 03:01

## 2019-12-02 RX ADMIN — GABAPENTIN 600 MG: 600 TABLET, FILM COATED ORAL at 12:48

## 2019-12-02 RX ADMIN — CEFTRIAXONE 2 G: 2 INJECTION, POWDER, FOR SOLUTION INTRAMUSCULAR; INTRAVENOUS at 03:01

## 2019-12-02 RX ADMIN — INSULIN LISPRO 3 UNITS: 100 INJECTION, SOLUTION INTRAVENOUS; SUBCUTANEOUS at 22:46

## 2019-12-02 RX ADMIN — METRONIDAZOLE 500 MG: 500 INJECTION, SOLUTION INTRAVENOUS at 23:08

## 2019-12-02 RX ADMIN — NITROGLYCERIN 0.4 MG: 0.4 TABLET, ORALLY DISINTEGRATING SUBLINGUAL at 10:59

## 2019-12-02 RX ADMIN — NITROGLYCERIN 0.5 INCH: 20 OINTMENT TOPICAL at 13:09

## 2019-12-02 RX ADMIN — GABAPENTIN 600 MG: 300 CAPSULE ORAL at 22:35

## 2019-12-02 RX ADMIN — MAGNESIUM SULFATE HEPTAHYDRATE 2 G: 40 INJECTION, SOLUTION INTRAVENOUS at 12:38

## 2019-12-02 RX ADMIN — NITROGLYCERIN 0.4 MG: 0.4 TABLET, ORALLY DISINTEGRATING SUBLINGUAL at 03:01

## 2019-12-02 RX ADMIN — METOPROLOL TARTRATE 100 MG: 50 TABLET, FILM COATED ORAL at 12:58

## 2019-12-02 RX ADMIN — ATORVASTATIN CALCIUM 80 MG: 40 TABLET, FILM COATED ORAL at 12:58

## 2019-12-02 RX ADMIN — RANOLAZINE 1000 MG: 500 TABLET, FILM COATED, EXTENDED RELEASE ORAL at 22:35

## 2019-12-02 RX ADMIN — SERTRALINE 100 MG: 100 TABLET, FILM COATED ORAL at 12:49

## 2019-12-02 ASSESSMENT — PAIN DESCRIPTION - PROGRESSION: CLINICAL_PROGRESSION: GRADUALLY WORSENING

## 2019-12-02 ASSESSMENT — PAIN SCALES - GENERAL
PAINLEVEL_OUTOF10: 0
PAINLEVEL_OUTOF10: 5
PAINLEVEL_OUTOF10: 7
PAINLEVEL_OUTOF10: 0
PAINLEVEL_OUTOF10: 0
PAINLEVEL_OUTOF10: 6

## 2019-12-02 ASSESSMENT — PAIN DESCRIPTION - LOCATION
LOCATION: HAND;LEG
LOCATION: CHEST;BACK

## 2019-12-02 ASSESSMENT — PAIN DESCRIPTION - ORIENTATION
ORIENTATION: RIGHT;LEFT
ORIENTATION: MID

## 2019-12-02 ASSESSMENT — PAIN DESCRIPTION - PAIN TYPE
TYPE: ACUTE PAIN
TYPE: CHRONIC PAIN
TYPE: CHRONIC PAIN

## 2019-12-02 ASSESSMENT — PAIN - FUNCTIONAL ASSESSMENT
PAIN_FUNCTIONAL_ASSESSMENT: ACTIVITIES ARE NOT PREVENTED
PAIN_FUNCTIONAL_ASSESSMENT: ACTIVITIES ARE NOT PREVENTED

## 2019-12-02 ASSESSMENT — PAIN DESCRIPTION - DESCRIPTORS
DESCRIPTORS: CONSTANT;DISCOMFORT;STABBING
DESCRIPTORS: DISCOMFORT;SHARP;TENDER

## 2019-12-02 ASSESSMENT — PAIN DESCRIPTION - FREQUENCY
FREQUENCY: CONTINUOUS
FREQUENCY: CONTINUOUS

## 2019-12-02 ASSESSMENT — PAIN DESCRIPTION - ONSET
ONSET: GRADUAL
ONSET: ON-GOING

## 2019-12-03 LAB — APTT: 36.9 SEC (ref 24.5–35.1)

## 2019-12-03 PROCEDURE — 6370000000 HC RX 637 (ALT 250 FOR IP): Performed by: INTERNAL MEDICINE

## 2019-12-03 RX ADMIN — HYDROCODONE BITARTRATE AND ACETAMINOPHEN 1 TABLET: 5; 325 TABLET ORAL at 00:00

## 2019-12-03 ASSESSMENT — PAIN SCALES - GENERAL: PAINLEVEL_OUTOF10: 7

## 2019-12-04 ENCOUNTER — TELEPHONE (OUTPATIENT)
Dept: NON INVASIVE DIAGNOSTICS | Age: 59
End: 2019-12-04

## 2019-12-04 LAB
ORGANISM: ABNORMAL
URINE CULTURE, ROUTINE: ABNORMAL

## 2019-12-06 PROBLEM — I25.10 CORONARY ARTERY DISEASE INVOLVING NATIVE CORONARY ARTERY OF NATIVE HEART WITHOUT ANGINA PECTORIS: Status: ACTIVE | Noted: 2019-12-06

## 2019-12-07 LAB
BLOOD CULTURE, ROUTINE: NORMAL
CULTURE, BLOOD 2: NORMAL

## 2019-12-09 LAB
EKG ATRIAL RATE: 96 BPM
EKG P AXIS: 59 DEGREES
EKG P-R INTERVAL: 154 MS
EKG Q-T INTERVAL: 372 MS
EKG QRS DURATION: 124 MS
EKG QTC CALCULATION (BAZETT): 469 MS
EKG R AXIS: 66 DEGREES
EKG T AXIS: 54 DEGREES
EKG VENTRICULAR RATE: 96 BPM

## 2019-12-13 ENCOUNTER — HOSPITAL ENCOUNTER (INPATIENT)
Age: 59
LOS: 5 days | Discharge: HOME | DRG: 194 | End: 2019-12-18
Attending: EMERGENCY MEDICINE | Admitting: INTERNAL MEDICINE
Payer: MEDICAID

## 2019-12-13 ENCOUNTER — APPOINTMENT (OUTPATIENT)
Dept: GENERAL RADIOLOGY | Age: 59
DRG: 194 | End: 2019-12-13
Payer: MEDICAID

## 2019-12-13 ENCOUNTER — APPOINTMENT (OUTPATIENT)
Dept: CT IMAGING | Age: 59
DRG: 194 | End: 2019-12-13
Payer: MEDICAID

## 2019-12-13 DIAGNOSIS — E87.5 HYPERKALEMIA: ICD-10-CM

## 2019-12-13 DIAGNOSIS — I50.9 ACUTE ON CHRONIC CONGESTIVE HEART FAILURE, UNSPECIFIED HEART FAILURE TYPE (HCC): ICD-10-CM

## 2019-12-13 DIAGNOSIS — R41.82 ALTERED MENTAL STATUS, UNSPECIFIED ALTERED MENTAL STATUS TYPE: Primary | ICD-10-CM

## 2019-12-13 DIAGNOSIS — R79.89 ELEVATED LACTIC ACID LEVEL: ICD-10-CM

## 2019-12-13 DIAGNOSIS — I95.9 HYPOTENSION, UNSPECIFIED HYPOTENSION TYPE: ICD-10-CM

## 2019-12-13 LAB
ALBUMIN SERPL-MCNC: 3.2 G/DL (ref 3.5–5.2)
ALP BLD-CCNC: 83 U/L (ref 35–104)
ALT SERPL-CCNC: 47 U/L (ref 0–32)
AMMONIA: 34 UMOL/L (ref 11–51)
ANION GAP SERPL CALCULATED.3IONS-SCNC: 14 MMOL/L (ref 7–16)
APTT: 28.4 SEC (ref 24.5–35.1)
AST SERPL-CCNC: 51 U/L (ref 0–31)
B.E.: -4.1 MMOL/L (ref -3–3)
BASOPHILS ABSOLUTE: 0.07 E9/L (ref 0–0.2)
BASOPHILS RELATIVE PERCENT: 0.7 % (ref 0–2)
BILIRUB SERPL-MCNC: 0.8 MG/DL (ref 0–1.2)
BILIRUBIN URINE: NEGATIVE
BLOOD, URINE: NEGATIVE
BUN BLDV-MCNC: 34 MG/DL (ref 8–23)
CALCIUM SERPL-MCNC: 8.7 MG/DL (ref 8.6–10.2)
CHLORIDE BLD-SCNC: 99 MMOL/L (ref 98–107)
CHP ED QC CHECK: NORMAL
CLARITY: CLEAR
CO2: 22 MMOL/L (ref 22–29)
COHB: 1 % (ref 0–1.5)
COLOR: YELLOW
COMMENT: ABNORMAL
CREAT SERPL-MCNC: 1.3 MG/DL (ref 0.5–1)
CRITICAL: ABNORMAL
DATE ANALYZED: ABNORMAL
DATE OF COLLECTION: ABNORMAL
EOSINOPHILS ABSOLUTE: 0.07 E9/L (ref 0.05–0.5)
EOSINOPHILS RELATIVE PERCENT: 0.7 % (ref 0–6)
GFR AFRICAN AMERICAN: 46
GFR AFRICAN AMERICAN: 50
GFR NON-AFRICAN AMERICAN: 38 ML/MIN/1.73
GFR NON-AFRICAN AMERICAN: 42 ML/MIN/1.73
GLUCOSE BLD-MCNC: 284 MG/DL (ref 74–99)
GLUCOSE BLD-MCNC: 290 MG/DL (ref 74–99)
GLUCOSE BLD-MCNC: 299 MG/DL
GLUCOSE URINE: 500 MG/DL
HCO3: 21.2 MMOL/L (ref 22–26)
HCT VFR BLD CALC: 32.6 % (ref 34–48)
HEMOGLOBIN: 9.6 G/DL (ref 11.5–15.5)
HHB: 2.3 % (ref 0–5)
IMMATURE GRANULOCYTES #: 0.07 E9/L
IMMATURE GRANULOCYTES %: 0.7 % (ref 0–5)
INR BLD: 1.1
KETONES, URINE: NEGATIVE MG/DL
LAB: ABNORMAL
LACTIC ACID: 3.8 MMOL/L (ref 0.5–2.2)
LEUKOCYTE ESTERASE, URINE: NEGATIVE
LIPASE: 5 U/L (ref 13–60)
LYMPHOCYTES ABSOLUTE: 1.47 E9/L (ref 1.5–4)
LYMPHOCYTES RELATIVE PERCENT: 14.2 % (ref 20–42)
Lab: ABNORMAL
MCH RBC QN AUTO: 31.2 PG (ref 26–35)
MCHC RBC AUTO-ENTMCNC: 29.4 % (ref 32–34.5)
MCV RBC AUTO: 105.8 FL (ref 80–99.9)
METER GLUCOSE: 299 MG/DL (ref 74–99)
METHB: 0 % (ref 0–1.5)
MONOCYTES ABSOLUTE: 0.91 E9/L (ref 0.1–0.95)
MONOCYTES RELATIVE PERCENT: 8.8 % (ref 2–12)
NEUTROPHILS ABSOLUTE: 7.76 E9/L (ref 1.8–7.3)
NEUTROPHILS RELATIVE PERCENT: 74.9 % (ref 43–80)
NITRITE, URINE: NEGATIVE
O2 CONTENT: 14.3 ML/DL
O2 SATURATION: 97.7 % (ref 92–98.5)
O2HB: 96.7 % (ref 94–97)
OPERATOR ID: 1874
PATIENT TEMP: 37 C
PCO2: 39.6 MMHG (ref 35–45)
PDW BLD-RTO: 15.5 FL (ref 11.5–15)
PERFORMED ON: ABNORMAL
PH BLOOD GAS: 7.35 (ref 7.35–7.45)
PH UA: 5.5 (ref 5–9)
PLATELET # BLD: 371 E9/L (ref 130–450)
PMV BLD AUTO: 10.1 FL (ref 7–12)
PO2: 106 MMHG (ref 60–100)
POC CHLORIDE: 103 MMOL/L (ref 100–108)
POC CREATININE: 1.4 MG/DL (ref 0.5–1)
POC POTASSIUM: 7.4 MMOL/L (ref 3.5–5)
POC SODIUM: 134 MMOL/L (ref 132–146)
POTASSIUM REFLEX MAGNESIUM: 5.9 MMOL/L (ref 3.5–5)
PRO-BNP: ABNORMAL PG/ML (ref 0–125)
PROTEIN UA: NEGATIVE MG/DL
PROTHROMBIN TIME: 12.2 SEC (ref 9.3–12.4)
RBC # BLD: 3.08 E12/L (ref 3.5–5.5)
SODIUM BLD-SCNC: 135 MMOL/L (ref 132–146)
SOURCE, BLOOD GAS: ABNORMAL
SPECIFIC GRAVITY UA: 1.02 (ref 1–1.03)
THB: 10.4 G/DL (ref 11.5–16.5)
TIME ANALYZED: 2122
TOTAL PROTEIN: 6.8 G/DL (ref 6.4–8.3)
TROPONIN: 0.15 NG/ML (ref 0–0.03)
UROBILINOGEN, URINE: 0.2 E.U./DL
WBC # BLD: 10.4 E9/L (ref 4.5–11.5)

## 2019-12-13 PROCEDURE — 81003 URINALYSIS AUTO W/O SCOPE: CPT

## 2019-12-13 PROCEDURE — 2500000003 HC RX 250 WO HCPCS: Performed by: EMERGENCY MEDICINE

## 2019-12-13 PROCEDURE — 2580000003 HC RX 258: Performed by: STUDENT IN AN ORGANIZED HEALTH CARE EDUCATION/TRAINING PROGRAM

## 2019-12-13 PROCEDURE — 83690 ASSAY OF LIPASE: CPT

## 2019-12-13 PROCEDURE — 82140 ASSAY OF AMMONIA: CPT

## 2019-12-13 PROCEDURE — 87040 BLOOD CULTURE FOR BACTERIA: CPT

## 2019-12-13 PROCEDURE — 70450 CT HEAD/BRAIN W/O DYE: CPT

## 2019-12-13 PROCEDURE — 82435 ASSAY OF BLOOD CHLORIDE: CPT

## 2019-12-13 PROCEDURE — 82962 GLUCOSE BLOOD TEST: CPT

## 2019-12-13 PROCEDURE — 93005 ELECTROCARDIOGRAM TRACING: CPT | Performed by: STUDENT IN AN ORGANIZED HEALTH CARE EDUCATION/TRAINING PROGRAM

## 2019-12-13 PROCEDURE — 85730 THROMBOPLASTIN TIME PARTIAL: CPT

## 2019-12-13 PROCEDURE — 6360000002 HC RX W HCPCS: Performed by: STUDENT IN AN ORGANIZED HEALTH CARE EDUCATION/TRAINING PROGRAM

## 2019-12-13 PROCEDURE — 83880 ASSAY OF NATRIURETIC PEPTIDE: CPT

## 2019-12-13 PROCEDURE — 82805 BLOOD GASES W/O2 SATURATION: CPT

## 2019-12-13 PROCEDURE — 82565 ASSAY OF CREATININE: CPT

## 2019-12-13 PROCEDURE — 2000000000 HC ICU R&B

## 2019-12-13 PROCEDURE — 84295 ASSAY OF SERUM SODIUM: CPT

## 2019-12-13 PROCEDURE — 36556 INSERT NON-TUNNEL CV CATH: CPT

## 2019-12-13 PROCEDURE — 99285 EMERGENCY DEPT VISIT HI MDM: CPT

## 2019-12-13 PROCEDURE — 84132 ASSAY OF SERUM POTASSIUM: CPT

## 2019-12-13 PROCEDURE — 84439 ASSAY OF FREE THYROXINE: CPT

## 2019-12-13 PROCEDURE — 6370000000 HC RX 637 (ALT 250 FOR IP): Performed by: EMERGENCY MEDICINE

## 2019-12-13 PROCEDURE — 80053 COMPREHEN METABOLIC PANEL: CPT

## 2019-12-13 PROCEDURE — 74018 RADEX ABDOMEN 1 VIEW: CPT

## 2019-12-13 PROCEDURE — 85025 COMPLETE CBC W/AUTO DIFF WBC: CPT

## 2019-12-13 PROCEDURE — 36415 COLL VENOUS BLD VENIPUNCTURE: CPT

## 2019-12-13 PROCEDURE — 83605 ASSAY OF LACTIC ACID: CPT

## 2019-12-13 PROCEDURE — 94640 AIRWAY INHALATION TREATMENT: CPT

## 2019-12-13 PROCEDURE — 96372 THER/PROPH/DIAG INJ SC/IM: CPT

## 2019-12-13 PROCEDURE — 6370000000 HC RX 637 (ALT 250 FOR IP): Performed by: STUDENT IN AN ORGANIZED HEALTH CARE EDUCATION/TRAINING PROGRAM

## 2019-12-13 PROCEDURE — 82947 ASSAY GLUCOSE BLOOD QUANT: CPT

## 2019-12-13 PROCEDURE — 84484 ASSAY OF TROPONIN QUANT: CPT

## 2019-12-13 PROCEDURE — 85610 PROTHROMBIN TIME: CPT

## 2019-12-13 PROCEDURE — 02HV33Z INSERTION OF INFUSION DEVICE INTO SUPERIOR VENA CAVA, PERCUTANEOUS APPROACH: ICD-10-PCS | Performed by: STUDENT IN AN ORGANIZED HEALTH CARE EDUCATION/TRAINING PROGRAM

## 2019-12-13 PROCEDURE — 71045 X-RAY EXAM CHEST 1 VIEW: CPT

## 2019-12-13 RX ORDER — IPRATROPIUM BROMIDE AND ALBUTEROL SULFATE 2.5; .5 MG/3ML; MG/3ML
1 SOLUTION RESPIRATORY (INHALATION)
Status: DISCONTINUED | OUTPATIENT
Start: 2019-12-14 | End: 2019-12-18 | Stop reason: HOSPADM

## 2019-12-13 RX ORDER — IPRATROPIUM BROMIDE AND ALBUTEROL SULFATE 2.5; .5 MG/3ML; MG/3ML
3 SOLUTION RESPIRATORY (INHALATION) ONCE
Status: COMPLETED | OUTPATIENT
Start: 2019-12-13 | End: 2019-12-13

## 2019-12-13 RX ORDER — FUROSEMIDE 10 MG/ML
40 INJECTION INTRAMUSCULAR; INTRAVENOUS ONCE
Status: COMPLETED | OUTPATIENT
Start: 2019-12-13 | End: 2019-12-14

## 2019-12-13 RX ORDER — 0.9 % SODIUM CHLORIDE 0.9 %
500 INTRAVENOUS SOLUTION INTRAVENOUS ONCE
Status: COMPLETED | OUTPATIENT
Start: 2019-12-13 | End: 2019-12-13

## 2019-12-13 RX ORDER — IPRATROPIUM BROMIDE AND ALBUTEROL SULFATE 2.5; .5 MG/3ML; MG/3ML
1 SOLUTION RESPIRATORY (INHALATION) ONCE
Status: DISCONTINUED | OUTPATIENT
Start: 2019-12-13 | End: 2019-12-13

## 2019-12-13 RX ORDER — SENNA PLUS 8.6 MG/1
1 TABLET ORAL NIGHTLY
Status: DISCONTINUED | OUTPATIENT
Start: 2019-12-13 | End: 2019-12-17

## 2019-12-13 RX ORDER — SODIUM CHLORIDE 9 MG/ML
INJECTION, SOLUTION INTRAVENOUS CONTINUOUS
Status: DISCONTINUED | OUTPATIENT
Start: 2019-12-13 | End: 2019-12-14

## 2019-12-13 RX ORDER — CALCIUM GLUCONATE 94 MG/ML
INJECTION, SOLUTION INTRAVENOUS
Status: DISPENSED
Start: 2019-12-13 | End: 2019-12-14

## 2019-12-13 RX ADMIN — Medication 10 MCG/MIN: at 20:22

## 2019-12-13 RX ADMIN — IPRATROPIUM BROMIDE AND ALBUTEROL SULFATE 3 AMPULE: 2.5; .5 SOLUTION RESPIRATORY (INHALATION) at 19:20

## 2019-12-13 RX ADMIN — CALCIUM GLUCONATE 1 G: 98 INJECTION, SOLUTION INTRAVENOUS at 19:08

## 2019-12-13 RX ADMIN — INSULIN LISPRO 5 UNITS: 100 INJECTION, SOLUTION INTRAVENOUS; SUBCUTANEOUS at 19:08

## 2019-12-13 RX ADMIN — SODIUM CHLORIDE 500 ML: 9 INJECTION, SOLUTION INTRAVENOUS at 18:36

## 2019-12-13 ASSESSMENT — PAIN DESCRIPTION - LOCATION: LOCATION: HEAD

## 2019-12-13 ASSESSMENT — PAIN DESCRIPTION - FREQUENCY: FREQUENCY: CONTINUOUS

## 2019-12-13 ASSESSMENT — PAIN SCALES - GENERAL: PAINLEVEL_OUTOF10: 6

## 2019-12-13 ASSESSMENT — PAIN DESCRIPTION - DESCRIPTORS: DESCRIPTORS: ACHING

## 2019-12-13 ASSESSMENT — PAIN DESCRIPTION - PAIN TYPE: TYPE: ACUTE PAIN

## 2019-12-14 ENCOUNTER — APPOINTMENT (OUTPATIENT)
Dept: GENERAL RADIOLOGY | Age: 59
DRG: 194 | End: 2019-12-14
Payer: MEDICAID

## 2019-12-14 PROBLEM — Z89.612 HX OF AKA (ABOVE KNEE AMPUTATION), LEFT (HCC): Status: ACTIVE | Noted: 2017-06-28

## 2019-12-14 LAB
ADENOVIRUS BY PCR: NOT DETECTED
ALBUMIN SERPL-MCNC: 3.1 G/DL (ref 3.5–5.2)
ALP BLD-CCNC: 72 U/L (ref 35–104)
ALT SERPL-CCNC: 39 U/L (ref 0–32)
AMPHETAMINE SCREEN, URINE: NOT DETECTED
ANION GAP SERPL CALCULATED.3IONS-SCNC: 11 MMOL/L (ref 7–16)
ANION GAP SERPL CALCULATED.3IONS-SCNC: 11 MMOL/L (ref 7–16)
AST SERPL-CCNC: 25 U/L (ref 0–31)
B.E.: -2.9 MMOL/L (ref -3–3)
BARBITURATE SCREEN URINE: NOT DETECTED
BASOPHILS ABSOLUTE: 0.05 E9/L (ref 0–0.2)
BASOPHILS RELATIVE PERCENT: 0.5 % (ref 0–2)
BENZODIAZEPINE SCREEN, URINE: NOT DETECTED
BILIRUB SERPL-MCNC: 0.6 MG/DL (ref 0–1.2)
BORDETELLA PARAPERTUSSIS BY PCR: NOT DETECTED
BORDETELLA PERTUSSIS BY PCR: NOT DETECTED
BUN BLDV-MCNC: 37 MG/DL (ref 8–23)
BUN BLDV-MCNC: 37 MG/DL (ref 8–23)
CALCIUM SERPL-MCNC: 8.3 MG/DL (ref 8.6–10.2)
CALCIUM SERPL-MCNC: 8.5 MG/DL (ref 8.6–10.2)
CANNABINOID SCREEN URINE: NOT DETECTED
CHLAMYDOPHILIA PNEUMONIAE BY PCR: NOT DETECTED
CHLORIDE BLD-SCNC: 102 MMOL/L (ref 98–107)
CHLORIDE BLD-SCNC: 104 MMOL/L (ref 98–107)
CHLORIDE URINE RANDOM: <20 MMOL/L
CK MB: 4.2 NG/ML (ref 0–4.3)
CO2: 23 MMOL/L (ref 22–29)
CO2: 23 MMOL/L (ref 22–29)
COCAINE METABOLITE SCREEN URINE: NOT DETECTED
COHB: 0.7 % (ref 0–1.5)
CORONAVIRUS 229E BY PCR: NOT DETECTED
CORONAVIRUS HKU1 BY PCR: NOT DETECTED
CORONAVIRUS NL63 BY PCR: NOT DETECTED
CORONAVIRUS OC43 BY PCR: NOT DETECTED
CORTISOL TOTAL: 9.76 MCG/DL (ref 2.68–18.4)
CREAT SERPL-MCNC: 1.5 MG/DL (ref 0.5–1)
CREAT SERPL-MCNC: 1.6 MG/DL (ref 0.5–1)
CREATININE URINE: 191 MG/DL (ref 29–226)
CRITICAL: ABNORMAL
DATE ANALYZED: ABNORMAL
DATE OF COLLECTION: ABNORMAL
EOSINOPHILS ABSOLUTE: 0.14 E9/L (ref 0.05–0.5)
EOSINOPHILS RELATIVE PERCENT: 1.5 % (ref 0–6)
FENTANYL SCREEN, URINE: NOT DETECTED
FOLATE: 9.7 NG/ML (ref 4.8–24.2)
GFR AFRICAN AMERICAN: 40
GFR AFRICAN AMERICAN: 43
GFR NON-AFRICAN AMERICAN: 33 ML/MIN/1.73
GFR NON-AFRICAN AMERICAN: 35 ML/MIN/1.73
GLUCOSE BLD-MCNC: 225 MG/DL (ref 74–99)
GLUCOSE BLD-MCNC: 258 MG/DL (ref 74–99)
HCO3: 22.2 MMOL/L (ref 22–26)
HCT VFR BLD CALC: 28.5 % (ref 34–48)
HEMOGLOBIN: 8.4 G/DL (ref 11.5–15.5)
HHB: 5.7 % (ref 0–5)
HUMAN METAPNEUMOVIRUS BY PCR: NOT DETECTED
HUMAN RHINOVIRUS/ENTEROVIRUS BY PCR: NOT DETECTED
IMMATURE GRANULOCYTES #: 0.09 E9/L
IMMATURE GRANULOCYTES %: 1 % (ref 0–5)
INFLUENZA A BY PCR: NOT DETECTED
INFLUENZA B BY PCR: NOT DETECTED
LAB: ABNORMAL
LACTIC ACID: 1.5 MMOL/L (ref 0.5–2.2)
LYMPHOCYTES ABSOLUTE: 2.01 E9/L (ref 1.5–4)
LYMPHOCYTES RELATIVE PERCENT: 21.7 % (ref 20–42)
Lab: ABNORMAL
Lab: ABNORMAL
MAGNESIUM: 1.9 MG/DL (ref 1.6–2.6)
MCH RBC QN AUTO: 31.2 PG (ref 26–35)
MCHC RBC AUTO-ENTMCNC: 29.5 % (ref 32–34.5)
MCV RBC AUTO: 105.9 FL (ref 80–99.9)
METER GLUCOSE: 202 MG/DL (ref 74–99)
METER GLUCOSE: 222 MG/DL (ref 74–99)
METER GLUCOSE: 223 MG/DL (ref 74–99)
METER GLUCOSE: 287 MG/DL (ref 74–99)
METER GLUCOSE: 307 MG/DL (ref 74–99)
METER GLUCOSE: 345 MG/DL (ref 74–99)
METER GLUCOSE: 353 MG/DL (ref 74–99)
METHADONE SCREEN, URINE: NOT DETECTED
METHB: 0.1 % (ref 0–1.5)
MODE: ABNORMAL
MONOCYTES ABSOLUTE: 0.63 E9/L (ref 0.1–0.95)
MONOCYTES RELATIVE PERCENT: 6.8 % (ref 2–12)
MYCOPLASMA PNEUMONIAE BY PCR: NOT DETECTED
NEUTROPHILS ABSOLUTE: 6.34 E9/L (ref 1.8–7.3)
NEUTROPHILS RELATIVE PERCENT: 68.5 % (ref 43–80)
O2 CONTENT: 13.4 ML/DL
O2 SATURATION: 94.3 % (ref 92–98.5)
O2HB: 93.5 % (ref 94–97)
OPERATOR ID: 2593
OPIATE SCREEN URINE: POSITIVE
OXYCODONE URINE: NOT DETECTED
PARAINFLUENZA VIRUS 1 BY PCR: NOT DETECTED
PARAINFLUENZA VIRUS 2 BY PCR: NOT DETECTED
PARAINFLUENZA VIRUS 3 BY PCR: NOT DETECTED
PARAINFLUENZA VIRUS 4 BY PCR: NOT DETECTED
PATIENT TEMP: 37 C
PCO2: 39.7 MMHG (ref 35–45)
PDW BLD-RTO: 15.6 FL (ref 11.5–15)
PH BLOOD GAS: 7.37 (ref 7.35–7.45)
PHENCYCLIDINE SCREEN URINE: NOT DETECTED
PHOSPHORUS: 3.4 MG/DL (ref 2.5–4.5)
PLATELET # BLD: 322 E9/L (ref 130–450)
PMV BLD AUTO: 9.9 FL (ref 7–12)
PO2: 72 MMHG (ref 60–100)
POTASSIUM SERPL-SCNC: 4.5 MMOL/L (ref 3.5–5)
POTASSIUM SERPL-SCNC: 4.7 MMOL/L (ref 3.5–5)
POTASSIUM, UR: 71.8 MMOL/L
PROCALCITONIN: 0.14 NG/ML (ref 0–0.08)
RBC # BLD: 2.69 E12/L (ref 3.5–5.5)
RESPIRATORY SYNCYTIAL VIRUS BY PCR: NOT DETECTED
SODIUM BLD-SCNC: 136 MMOL/L (ref 132–146)
SODIUM BLD-SCNC: 138 MMOL/L (ref 132–146)
SODIUM URINE: 22 MMOL/L
SOURCE, BLOOD GAS: ABNORMAL
T4 FREE: 0.7 NG/DL (ref 0.93–1.7)
THB: 10.1 G/DL (ref 11.5–16.5)
TIME ANALYZED: 628
TOTAL CK: 49 U/L (ref 20–180)
TOTAL PROTEIN: 5.9 G/DL (ref 6.4–8.3)
TROPONIN: 0.13 NG/ML (ref 0–0.03)
TROPONIN: 0.16 NG/ML (ref 0–0.03)
TSH SERPL DL<=0.05 MIU/L-ACNC: 5.37 UIU/ML (ref 0.27–4.2)
VITAMIN B-12: 589 PG/ML (ref 211–946)
WBC # BLD: 9.3 E9/L (ref 4.5–11.5)

## 2019-12-14 PROCEDURE — 36415 COLL VENOUS BLD VENIPUNCTURE: CPT

## 2019-12-14 PROCEDURE — 84100 ASSAY OF PHOSPHORUS: CPT

## 2019-12-14 PROCEDURE — 80053 COMPREHEN METABOLIC PANEL: CPT

## 2019-12-14 PROCEDURE — 2700000000 HC OXYGEN THERAPY PER DAY

## 2019-12-14 PROCEDURE — 82805 BLOOD GASES W/O2 SATURATION: CPT

## 2019-12-14 PROCEDURE — 85025 COMPLETE CBC W/AUTO DIFF WBC: CPT

## 2019-12-14 PROCEDURE — 6370000000 HC RX 637 (ALT 250 FOR IP): Performed by: INTERNAL MEDICINE

## 2019-12-14 PROCEDURE — 82962 GLUCOSE BLOOD TEST: CPT

## 2019-12-14 PROCEDURE — 99223 1ST HOSP IP/OBS HIGH 75: CPT | Performed by: INTERNAL MEDICINE

## 2019-12-14 PROCEDURE — 2580000003 HC RX 258: Performed by: INTERNAL MEDICINE

## 2019-12-14 PROCEDURE — 2500000003 HC RX 250 WO HCPCS: Performed by: INTERNAL MEDICINE

## 2019-12-14 PROCEDURE — 83735 ASSAY OF MAGNESIUM: CPT

## 2019-12-14 PROCEDURE — 6360000002 HC RX W HCPCS: Performed by: INTERNAL MEDICINE

## 2019-12-14 PROCEDURE — 96365 THER/PROPH/DIAG IV INF INIT: CPT

## 2019-12-14 PROCEDURE — 84133 ASSAY OF URINE POTASSIUM: CPT

## 2019-12-14 PROCEDURE — 83605 ASSAY OF LACTIC ACID: CPT

## 2019-12-14 PROCEDURE — 84484 ASSAY OF TROPONIN QUANT: CPT

## 2019-12-14 PROCEDURE — 82570 ASSAY OF URINE CREATININE: CPT

## 2019-12-14 PROCEDURE — 82436 ASSAY OF URINE CHLORIDE: CPT

## 2019-12-14 PROCEDURE — 82550 ASSAY OF CK (CPK): CPT

## 2019-12-14 PROCEDURE — 80307 DRUG TEST PRSMV CHEM ANLYZR: CPT

## 2019-12-14 PROCEDURE — 71045 X-RAY EXAM CHEST 1 VIEW: CPT

## 2019-12-14 PROCEDURE — 80048 BASIC METABOLIC PNL TOTAL CA: CPT

## 2019-12-14 PROCEDURE — 84145 PROCALCITONIN (PCT): CPT

## 2019-12-14 PROCEDURE — 6360000002 HC RX W HCPCS: Performed by: STUDENT IN AN ORGANIZED HEALTH CARE EDUCATION/TRAINING PROGRAM

## 2019-12-14 PROCEDURE — 87081 CULTURE SCREEN ONLY: CPT

## 2019-12-14 PROCEDURE — 87088 URINE BACTERIA CULTURE: CPT

## 2019-12-14 PROCEDURE — 99255 IP/OBS CONSLTJ NEW/EST HI 80: CPT | Performed by: INTERNAL MEDICINE

## 2019-12-14 PROCEDURE — 84443 ASSAY THYROID STIM HORMONE: CPT

## 2019-12-14 PROCEDURE — 2000000000 HC ICU R&B

## 2019-12-14 PROCEDURE — 82533 TOTAL CORTISOL: CPT

## 2019-12-14 PROCEDURE — 2580000003 HC RX 258: Performed by: STUDENT IN AN ORGANIZED HEALTH CARE EDUCATION/TRAINING PROGRAM

## 2019-12-14 PROCEDURE — 82553 CREATINE MB FRACTION: CPT

## 2019-12-14 PROCEDURE — 82607 VITAMIN B-12: CPT

## 2019-12-14 PROCEDURE — 0100U HC RESPIRPTHGN MULT REV TRANS & AMP PRB TECH 21 TRGT: CPT

## 2019-12-14 PROCEDURE — 84300 ASSAY OF URINE SODIUM: CPT

## 2019-12-14 PROCEDURE — 96366 THER/PROPH/DIAG IV INF ADDON: CPT

## 2019-12-14 PROCEDURE — 94640 AIRWAY INHALATION TREATMENT: CPT

## 2019-12-14 PROCEDURE — 99254 IP/OBS CNSLTJ NEW/EST MOD 60: CPT | Performed by: INTERNAL MEDICINE

## 2019-12-14 PROCEDURE — 82746 ASSAY OF FOLIC ACID SERUM: CPT

## 2019-12-14 RX ORDER — PANTOPRAZOLE SODIUM 40 MG/1
40 TABLET, DELAYED RELEASE ORAL
Status: DISCONTINUED | OUTPATIENT
Start: 2019-12-14 | End: 2019-12-14 | Stop reason: SDUPTHER

## 2019-12-14 RX ORDER — BUMETANIDE 0.25 MG/ML
1 INJECTION, SOLUTION INTRAMUSCULAR; INTRAVENOUS 2 TIMES DAILY
Status: COMPLETED | OUTPATIENT
Start: 2019-12-14 | End: 2019-12-16

## 2019-12-14 RX ORDER — GABAPENTIN 300 MG/1
600 CAPSULE ORAL 2 TIMES DAILY
Status: DISCONTINUED | OUTPATIENT
Start: 2019-12-14 | End: 2019-12-15

## 2019-12-14 RX ORDER — HYDROCODONE BITARTRATE AND ACETAMINOPHEN 5; 325 MG/1; MG/1
1 TABLET ORAL EVERY 6 HOURS PRN
Status: DISCONTINUED | OUTPATIENT
Start: 2019-12-14 | End: 2019-12-18 | Stop reason: HOSPADM

## 2019-12-14 RX ORDER — ASPIRIN 81 MG/1
81 TABLET ORAL DAILY
Status: DISCONTINUED | OUTPATIENT
Start: 2019-12-14 | End: 2019-12-18 | Stop reason: HOSPADM

## 2019-12-14 RX ORDER — GABAPENTIN 300 MG/1
600 CAPSULE ORAL 3 TIMES DAILY
Status: DISCONTINUED | OUTPATIENT
Start: 2019-12-14 | End: 2019-12-14

## 2019-12-14 RX ORDER — ATORVASTATIN CALCIUM 40 MG/1
80 TABLET, FILM COATED ORAL DAILY
Status: DISCONTINUED | OUTPATIENT
Start: 2019-12-14 | End: 2019-12-18 | Stop reason: HOSPADM

## 2019-12-14 RX ORDER — PANTOPRAZOLE SODIUM 40 MG/1
40 TABLET, DELAYED RELEASE ORAL
Status: DISCONTINUED | OUTPATIENT
Start: 2019-12-14 | End: 2019-12-18 | Stop reason: HOSPADM

## 2019-12-14 RX ORDER — SODIUM CHLORIDE 0.9 % (FLUSH) 0.9 %
10 SYRINGE (ML) INJECTION PRN
Status: DISCONTINUED | OUTPATIENT
Start: 2019-12-14 | End: 2019-12-18 | Stop reason: HOSPADM

## 2019-12-14 RX ORDER — ONDANSETRON 2 MG/ML
4 INJECTION INTRAMUSCULAR; INTRAVENOUS EVERY 6 HOURS PRN
Status: DISCONTINUED | OUTPATIENT
Start: 2019-12-14 | End: 2019-12-18 | Stop reason: HOSPADM

## 2019-12-14 RX ORDER — DEXTROSE MONOHYDRATE 50 MG/ML
100 INJECTION, SOLUTION INTRAVENOUS PRN
Status: DISCONTINUED | OUTPATIENT
Start: 2019-12-14 | End: 2019-12-18 | Stop reason: HOSPADM

## 2019-12-14 RX ORDER — MAGNESIUM SULFATE 1 G/100ML
1 INJECTION INTRAVENOUS ONCE
Status: COMPLETED | OUTPATIENT
Start: 2019-12-14 | End: 2019-12-14

## 2019-12-14 RX ORDER — SODIUM CHLORIDE 0.9 % (FLUSH) 0.9 %
10 SYRINGE (ML) INJECTION EVERY 12 HOURS SCHEDULED
Status: DISCONTINUED | OUTPATIENT
Start: 2019-12-14 | End: 2019-12-18 | Stop reason: HOSPADM

## 2019-12-14 RX ORDER — NICOTINE POLACRILEX 4 MG
15 LOZENGE BUCCAL PRN
Status: DISCONTINUED | OUTPATIENT
Start: 2019-12-14 | End: 2019-12-18 | Stop reason: HOSPADM

## 2019-12-14 RX ORDER — FOLIC ACID 1 MG/1
1 TABLET ORAL DAILY
Status: DISCONTINUED | OUTPATIENT
Start: 2019-12-14 | End: 2019-12-18 | Stop reason: HOSPADM

## 2019-12-14 RX ORDER — INSULIN GLARGINE 100 [IU]/ML
40 INJECTION, SOLUTION SUBCUTANEOUS NIGHTLY
Status: DISCONTINUED | OUTPATIENT
Start: 2019-12-14 | End: 2019-12-14

## 2019-12-14 RX ORDER — DEXTROSE MONOHYDRATE 25 G/50ML
12.5 INJECTION, SOLUTION INTRAVENOUS PRN
Status: DISCONTINUED | OUTPATIENT
Start: 2019-12-14 | End: 2019-12-18 | Stop reason: HOSPADM

## 2019-12-14 RX ORDER — DOCUSATE SODIUM 100 MG/1
100 CAPSULE, LIQUID FILLED ORAL DAILY
Status: DISCONTINUED | OUTPATIENT
Start: 2019-12-14 | End: 2019-12-17

## 2019-12-14 RX ORDER — INSULIN GLARGINE 100 [IU]/ML
65 INJECTION, SOLUTION SUBCUTANEOUS NIGHTLY
Status: DISCONTINUED | OUTPATIENT
Start: 2019-12-14 | End: 2019-12-18 | Stop reason: HOSPADM

## 2019-12-14 RX ORDER — BUMETANIDE 0.25 MG/ML
1 INJECTION, SOLUTION INTRAMUSCULAR; INTRAVENOUS ONCE
Status: COMPLETED | OUTPATIENT
Start: 2019-12-14 | End: 2019-12-14

## 2019-12-14 RX ADMIN — SODIUM CHLORIDE, PRESERVATIVE FREE 10 ML: 5 INJECTION INTRAVENOUS at 09:24

## 2019-12-14 RX ADMIN — CEFEPIME HYDROCHLORIDE 2 G: 2 INJECTION, POWDER, FOR SOLUTION INTRAVENOUS at 23:40

## 2019-12-14 RX ADMIN — BUMETANIDE 1 MG: 0.25 INJECTION INTRAMUSCULAR; INTRAVENOUS at 18:09

## 2019-12-14 RX ADMIN — INSULIN LISPRO 3 UNITS: 100 INJECTION, SOLUTION INTRAVENOUS; SUBCUTANEOUS at 04:23

## 2019-12-14 RX ADMIN — IPRATROPIUM BROMIDE AND ALBUTEROL SULFATE 1 AMPULE: 2.5; .5 SOLUTION RESPIRATORY (INHALATION) at 16:51

## 2019-12-14 RX ADMIN — GABAPENTIN 600 MG: 300 CAPSULE ORAL at 13:57

## 2019-12-14 RX ADMIN — CEFEPIME HYDROCHLORIDE 2 G: 2 INJECTION, POWDER, FOR SOLUTION INTRAVENOUS at 12:22

## 2019-12-14 RX ADMIN — BUMETANIDE 1 MG: 0.25 INJECTION INTRAMUSCULAR; INTRAVENOUS at 14:40

## 2019-12-14 RX ADMIN — INSULIN LISPRO 12 UNITS: 100 INJECTION, SOLUTION INTRAVENOUS; SUBCUTANEOUS at 17:07

## 2019-12-14 RX ADMIN — GABAPENTIN 600 MG: 300 CAPSULE ORAL at 20:29

## 2019-12-14 RX ADMIN — HYDROCORTISONE SODIUM SUCCINATE 50 MG: 100 INJECTION, POWDER, FOR SOLUTION INTRAMUSCULAR; INTRAVENOUS at 13:57

## 2019-12-14 RX ADMIN — SODIUM CHLORIDE: 9 INJECTION, SOLUTION INTRAVENOUS at 00:08

## 2019-12-14 RX ADMIN — HYDROCORTISONE SODIUM SUCCINATE 50 MG: 100 INJECTION, POWDER, FOR SOLUTION INTRAMUSCULAR; INTRAVENOUS at 18:09

## 2019-12-14 RX ADMIN — ASPIRIN 81 MG: 81 TABLET, COATED ORAL at 08:00

## 2019-12-14 RX ADMIN — IPRATROPIUM BROMIDE AND ALBUTEROL SULFATE 1 AMPULE: 2.5; .5 SOLUTION RESPIRATORY (INHALATION) at 12:04

## 2019-12-14 RX ADMIN — VANCOMYCIN HYDROCHLORIDE 2000 MG: 10 INJECTION, POWDER, LYOPHILIZED, FOR SOLUTION INTRAVENOUS at 02:45

## 2019-12-14 RX ADMIN — HYDROCODONE BITARTRATE AND ACETAMINOPHEN 1 TABLET: 5; 325 TABLET ORAL at 17:27

## 2019-12-14 RX ADMIN — MAGNESIUM SULFATE 1 G: 1 INJECTION INTRAVENOUS at 09:25

## 2019-12-14 RX ADMIN — DOCUSATE SODIUM 100 MG: 100 CAPSULE, LIQUID FILLED ORAL at 17:28

## 2019-12-14 RX ADMIN — GABAPENTIN 600 MG: 300 CAPSULE ORAL at 09:23

## 2019-12-14 RX ADMIN — HYDROCORTISONE SODIUM SUCCINATE 50 MG: 100 INJECTION, POWDER, FOR SOLUTION INTRAMUSCULAR; INTRAVENOUS at 08:01

## 2019-12-14 RX ADMIN — PANTOPRAZOLE SODIUM 40 MG: 40 TABLET, DELAYED RELEASE ORAL at 08:01

## 2019-12-14 RX ADMIN — SENNOSIDES 8.6 MG: 8.6 TABLET, FILM COATED ORAL at 20:28

## 2019-12-14 RX ADMIN — ATORVASTATIN CALCIUM 80 MG: 40 TABLET, FILM COATED ORAL at 20:28

## 2019-12-14 RX ADMIN — FUROSEMIDE 40 MG: 10 INJECTION, SOLUTION INTRAMUSCULAR; INTRAVENOUS at 00:25

## 2019-12-14 RX ADMIN — INSULIN LISPRO 7 UNITS: 100 INJECTION, SOLUTION INTRAVENOUS; SUBCUTANEOUS at 20:28

## 2019-12-14 RX ADMIN — ENOXAPARIN SODIUM 40 MG: 40 INJECTION SUBCUTANEOUS at 09:23

## 2019-12-14 RX ADMIN — INSULIN LISPRO 2 UNITS: 100 INJECTION, SOLUTION INTRAVENOUS; SUBCUTANEOUS at 00:58

## 2019-12-14 RX ADMIN — INSULIN GLARGINE 65 UNITS: 100 INJECTION, SOLUTION SUBCUTANEOUS at 20:28

## 2019-12-14 RX ADMIN — INSULIN LISPRO 2 UNITS: 100 INJECTION, SOLUTION INTRAVENOUS; SUBCUTANEOUS at 06:27

## 2019-12-14 RX ADMIN — INSULIN LISPRO 8 UNITS: 100 INJECTION, SOLUTION INTRAVENOUS; SUBCUTANEOUS at 12:22

## 2019-12-14 RX ADMIN — SENNOSIDES 8.6 MG: 8.6 TABLET, FILM COATED ORAL at 00:01

## 2019-12-14 RX ADMIN — FOLIC ACID 1 MG: 1 TABLET ORAL at 09:25

## 2019-12-14 RX ADMIN — CEFEPIME HYDROCHLORIDE 2 G: 2 INJECTION, POWDER, FOR SOLUTION INTRAVENOUS at 00:15

## 2019-12-14 RX ADMIN — IPRATROPIUM BROMIDE AND ALBUTEROL SULFATE 1 AMPULE: 2.5; .5 SOLUTION RESPIRATORY (INHALATION) at 20:41

## 2019-12-14 RX ADMIN — SODIUM CHLORIDE, PRESERVATIVE FREE 10 ML: 5 INJECTION INTRAVENOUS at 20:29

## 2019-12-14 ASSESSMENT — PAIN SCALES - GENERAL
PAINLEVEL_OUTOF10: 0
PAINLEVEL_OUTOF10: 7
PAINLEVEL_OUTOF10: 2
PAINLEVEL_OUTOF10: 0

## 2019-12-14 ASSESSMENT — PAIN DESCRIPTION - PAIN TYPE: TYPE: CHRONIC PAIN

## 2019-12-14 ASSESSMENT — PAIN - FUNCTIONAL ASSESSMENT: PAIN_FUNCTIONAL_ASSESSMENT: PREVENTS OR INTERFERES WITH MANY ACTIVE NOT PASSIVE ACTIVITIES

## 2019-12-14 ASSESSMENT — PAIN DESCRIPTION - FREQUENCY: FREQUENCY: INTERMITTENT

## 2019-12-14 ASSESSMENT — PAIN DESCRIPTION - LOCATION: LOCATION: LEG

## 2019-12-14 ASSESSMENT — PAIN DESCRIPTION - ORIENTATION: ORIENTATION: RIGHT;LEFT

## 2019-12-14 ASSESSMENT — PAIN DESCRIPTION - DESCRIPTORS: DESCRIPTORS: ACHING

## 2019-12-14 ASSESSMENT — PAIN DESCRIPTION - ONSET: ONSET: GRADUAL

## 2019-12-14 ASSESSMENT — PAIN DESCRIPTION - PROGRESSION: CLINICAL_PROGRESSION: GRADUALLY WORSENING

## 2019-12-15 LAB
ALBUMIN SERPL-MCNC: 3.5 G/DL (ref 3.5–5.2)
ALP BLD-CCNC: 72 U/L (ref 35–104)
ALT SERPL-CCNC: 34 U/L (ref 0–32)
ANION GAP SERPL CALCULATED.3IONS-SCNC: 12 MMOL/L (ref 7–16)
ANION GAP SERPL CALCULATED.3IONS-SCNC: 15 MMOL/L (ref 7–16)
ANISOCYTOSIS: ABNORMAL
AST SERPL-CCNC: 19 U/L (ref 0–31)
BASOPHILIC STIPPLING: ABNORMAL
BASOPHILS ABSOLUTE: 0.01 E9/L (ref 0–0.2)
BASOPHILS RELATIVE PERCENT: 0.1 % (ref 0–2)
BILIRUB SERPL-MCNC: 0.6 MG/DL (ref 0–1.2)
BUN BLDV-MCNC: 30 MG/DL (ref 8–23)
BUN BLDV-MCNC: 32 MG/DL (ref 8–23)
CALCIUM SERPL-MCNC: 8.6 MG/DL (ref 8.6–10.2)
CALCIUM SERPL-MCNC: 8.8 MG/DL (ref 8.6–10.2)
CHLORIDE BLD-SCNC: 101 MMOL/L (ref 98–107)
CHLORIDE BLD-SCNC: 101 MMOL/L (ref 98–107)
CO2: 22 MMOL/L (ref 22–29)
CO2: 27 MMOL/L (ref 22–29)
CREAT SERPL-MCNC: 1.2 MG/DL (ref 0.5–1)
CREAT SERPL-MCNC: 1.5 MG/DL (ref 0.5–1)
EOSINOPHILS ABSOLUTE: 0.01 E9/L (ref 0.05–0.5)
EOSINOPHILS RELATIVE PERCENT: 0.1 % (ref 0–6)
GFR AFRICAN AMERICAN: 43
GFR AFRICAN AMERICAN: 55
GFR NON-AFRICAN AMERICAN: 35 ML/MIN/1.73
GFR NON-AFRICAN AMERICAN: 46 ML/MIN/1.73
GLUCOSE BLD-MCNC: 281 MG/DL (ref 74–99)
GLUCOSE BLD-MCNC: 410 MG/DL (ref 74–99)
HCT VFR BLD CALC: 31 % (ref 34–48)
HEMOGLOBIN: 9.1 G/DL (ref 11.5–15.5)
IMMATURE GRANULOCYTES #: 0.06 E9/L
IMMATURE GRANULOCYTES %: 0.6 % (ref 0–5)
LYMPHOCYTES ABSOLUTE: 0.54 E9/L (ref 1.5–4)
LYMPHOCYTES RELATIVE PERCENT: 5.6 % (ref 20–42)
MAGNESIUM: 2.2 MG/DL (ref 1.6–2.6)
MCH RBC QN AUTO: 31 PG (ref 26–35)
MCHC RBC AUTO-ENTMCNC: 29.4 % (ref 32–34.5)
MCV RBC AUTO: 105.4 FL (ref 80–99.9)
METER GLUCOSE: 242 MG/DL (ref 74–99)
METER GLUCOSE: 262 MG/DL (ref 74–99)
METER GLUCOSE: 269 MG/DL (ref 74–99)
METER GLUCOSE: 356 MG/DL (ref 74–99)
METER GLUCOSE: 398 MG/DL (ref 74–99)
MONOCYTES ABSOLUTE: 0.49 E9/L (ref 0.1–0.95)
MONOCYTES RELATIVE PERCENT: 5 % (ref 2–12)
MRSA CULTURE ONLY: NORMAL
NEUTROPHILS ABSOLUTE: 8.6 E9/L (ref 1.8–7.3)
NEUTROPHILS RELATIVE PERCENT: 88.6 % (ref 43–80)
OVALOCYTES: ABNORMAL
PDW BLD-RTO: 15.5 FL (ref 11.5–15)
PHOSPHORUS: 3.5 MG/DL (ref 2.5–4.5)
PLATELET # BLD: 382 E9/L (ref 130–450)
PMV BLD AUTO: 9.9 FL (ref 7–12)
POIKILOCYTES: ABNORMAL
POLYCHROMASIA: ABNORMAL
POTASSIUM REFLEX MAGNESIUM: 4.4 MMOL/L (ref 3.5–5)
POTASSIUM SERPL-SCNC: 4.2 MMOL/L (ref 3.5–5)
POTASSIUM SERPL-SCNC: 4.4 MMOL/L (ref 3.5–5)
RBC # BLD: 2.94 E12/L (ref 3.5–5.5)
SODIUM BLD-SCNC: 138 MMOL/L (ref 132–146)
SODIUM BLD-SCNC: 140 MMOL/L (ref 132–146)
TOTAL PROTEIN: 6.1 G/DL (ref 6.4–8.3)
WBC # BLD: 9.7 E9/L (ref 4.5–11.5)

## 2019-12-15 PROCEDURE — 2580000003 HC RX 258: Performed by: INTERNAL MEDICINE

## 2019-12-15 PROCEDURE — 83735 ASSAY OF MAGNESIUM: CPT

## 2019-12-15 PROCEDURE — 2500000003 HC RX 250 WO HCPCS: Performed by: INTERNAL MEDICINE

## 2019-12-15 PROCEDURE — 6360000002 HC RX W HCPCS: Performed by: INTERNAL MEDICINE

## 2019-12-15 PROCEDURE — 82962 GLUCOSE BLOOD TEST: CPT

## 2019-12-15 PROCEDURE — 36415 COLL VENOUS BLD VENIPUNCTURE: CPT

## 2019-12-15 PROCEDURE — 99233 SBSQ HOSP IP/OBS HIGH 50: CPT | Performed by: INTERNAL MEDICINE

## 2019-12-15 PROCEDURE — 6370000000 HC RX 637 (ALT 250 FOR IP): Performed by: INTERNAL MEDICINE

## 2019-12-15 PROCEDURE — 80048 BASIC METABOLIC PNL TOTAL CA: CPT

## 2019-12-15 PROCEDURE — 2700000000 HC OXYGEN THERAPY PER DAY

## 2019-12-15 PROCEDURE — 94660 CPAP INITIATION&MGMT: CPT

## 2019-12-15 PROCEDURE — 85025 COMPLETE CBC W/AUTO DIFF WBC: CPT

## 2019-12-15 PROCEDURE — 2060000000 HC ICU INTERMEDIATE R&B

## 2019-12-15 PROCEDURE — 84100 ASSAY OF PHOSPHORUS: CPT

## 2019-12-15 PROCEDURE — 99232 SBSQ HOSP IP/OBS MODERATE 35: CPT | Performed by: INTERNAL MEDICINE

## 2019-12-15 PROCEDURE — 94640 AIRWAY INHALATION TREATMENT: CPT

## 2019-12-15 PROCEDURE — 80053 COMPREHEN METABOLIC PANEL: CPT

## 2019-12-15 RX ORDER — BACITRACIN 500 [USP'U]/G
OINTMENT TOPICAL 4 TIMES DAILY PRN
Status: DISCONTINUED | OUTPATIENT
Start: 2019-12-15 | End: 2019-12-18 | Stop reason: HOSPADM

## 2019-12-15 RX ADMIN — IPRATROPIUM BROMIDE AND ALBUTEROL SULFATE 1 AMPULE: 2.5; .5 SOLUTION RESPIRATORY (INHALATION) at 12:25

## 2019-12-15 RX ADMIN — INSULIN LISPRO 10 UNITS: 100 INJECTION, SOLUTION INTRAVENOUS; SUBCUTANEOUS at 08:25

## 2019-12-15 RX ADMIN — CEFEPIME HYDROCHLORIDE 2 G: 2 INJECTION, POWDER, FOR SOLUTION INTRAVENOUS at 12:16

## 2019-12-15 RX ADMIN — ENOXAPARIN SODIUM 40 MG: 40 INJECTION SUBCUTANEOUS at 08:08

## 2019-12-15 RX ADMIN — INSULIN LISPRO 6 UNITS: 100 INJECTION, SOLUTION INTRAVENOUS; SUBCUTANEOUS at 12:21

## 2019-12-15 RX ADMIN — HYDROCODONE BITARTRATE AND ACETAMINOPHEN 1 TABLET: 5; 325 TABLET ORAL at 04:08

## 2019-12-15 RX ADMIN — HYDROCORTISONE SODIUM SUCCINATE 50 MG: 100 INJECTION, POWDER, FOR SOLUTION INTRAMUSCULAR; INTRAVENOUS at 01:18

## 2019-12-15 RX ADMIN — VANCOMYCIN HYDROCHLORIDE 1.5 G: 10 INJECTION, POWDER, LYOPHILIZED, FOR SOLUTION INTRAVENOUS at 01:18

## 2019-12-15 RX ADMIN — SODIUM CHLORIDE, PRESERVATIVE FREE 10 ML: 5 INJECTION INTRAVENOUS at 01:19

## 2019-12-15 RX ADMIN — INSULIN LISPRO 20 UNITS: 100 INJECTION, SOLUTION INTRAVENOUS; SUBCUTANEOUS at 05:51

## 2019-12-15 RX ADMIN — BUMETANIDE 1 MG: 0.25 INJECTION INTRAMUSCULAR; INTRAVENOUS at 17:22

## 2019-12-15 RX ADMIN — INSULIN LISPRO 20 UNITS: 100 INJECTION, SOLUTION INTRAVENOUS; SUBCUTANEOUS at 12:20

## 2019-12-15 RX ADMIN — INSULIN LISPRO 20 UNITS: 100 INJECTION, SOLUTION INTRAVENOUS; SUBCUTANEOUS at 18:20

## 2019-12-15 RX ADMIN — INSULIN LISPRO 5 UNITS: 100 INJECTION, SOLUTION INTRAVENOUS; SUBCUTANEOUS at 21:40

## 2019-12-15 RX ADMIN — GABAPENTIN 700 MG: 300 CAPSULE ORAL at 21:13

## 2019-12-15 RX ADMIN — BUMETANIDE 1 MG: 0.25 INJECTION INTRAMUSCULAR; INTRAVENOUS at 08:07

## 2019-12-15 RX ADMIN — HYDROCORTISONE SODIUM SUCCINATE 50 MG: 100 INJECTION, POWDER, FOR SOLUTION INTRAMUSCULAR; INTRAVENOUS at 15:17

## 2019-12-15 RX ADMIN — INSULIN GLARGINE 65 UNITS: 100 INJECTION, SOLUTION SUBCUTANEOUS at 21:41

## 2019-12-15 RX ADMIN — GABAPENTIN 600 MG: 300 CAPSULE ORAL at 08:07

## 2019-12-15 RX ADMIN — HYDROCORTISONE SODIUM SUCCINATE 50 MG: 100 INJECTION, POWDER, FOR SOLUTION INTRAMUSCULAR; INTRAVENOUS at 08:08

## 2019-12-15 RX ADMIN — SODIUM CHLORIDE, PRESERVATIVE FREE 10 ML: 5 INJECTION INTRAVENOUS at 21:14

## 2019-12-15 RX ADMIN — ATORVASTATIN CALCIUM 80 MG: 40 TABLET, FILM COATED ORAL at 21:13

## 2019-12-15 RX ADMIN — SODIUM CHLORIDE, PRESERVATIVE FREE 10 ML: 5 INJECTION INTRAVENOUS at 17:22

## 2019-12-15 RX ADMIN — INSULIN LISPRO 6 UNITS: 100 INJECTION, SOLUTION INTRAVENOUS; SUBCUTANEOUS at 18:20

## 2019-12-15 RX ADMIN — FOLIC ACID 1 MG: 1 TABLET ORAL at 08:07

## 2019-12-15 RX ADMIN — HYDROCODONE BITARTRATE AND ACETAMINOPHEN 1 TABLET: 5; 325 TABLET ORAL at 19:45

## 2019-12-15 RX ADMIN — HYDROCORTISONE SODIUM SUCCINATE 50 MG: 100 INJECTION, POWDER, FOR SOLUTION INTRAMUSCULAR; INTRAVENOUS at 19:45

## 2019-12-15 RX ADMIN — SODIUM CHLORIDE, PRESERVATIVE FREE 10 ML: 5 INJECTION INTRAVENOUS at 08:09

## 2019-12-15 RX ADMIN — PANTOPRAZOLE SODIUM 40 MG: 40 TABLET, DELAYED RELEASE ORAL at 05:51

## 2019-12-15 RX ADMIN — ASPIRIN 81 MG: 81 TABLET, COATED ORAL at 08:07

## 2019-12-15 ASSESSMENT — PAIN - FUNCTIONAL ASSESSMENT: PAIN_FUNCTIONAL_ASSESSMENT: PREVENTS OR INTERFERES SOME ACTIVE ACTIVITIES AND ADLS

## 2019-12-15 ASSESSMENT — PAIN DESCRIPTION - DESCRIPTORS
DESCRIPTORS: ACHING
DESCRIPTORS: ACHING

## 2019-12-15 ASSESSMENT — PAIN DESCRIPTION - PROGRESSION: CLINICAL_PROGRESSION: NOT CHANGED

## 2019-12-15 ASSESSMENT — PAIN SCALES - GENERAL
PAINLEVEL_OUTOF10: 8
PAINLEVEL_OUTOF10: 0
PAINLEVEL_OUTOF10: 8
PAINLEVEL_OUTOF10: 0

## 2019-12-15 ASSESSMENT — PAIN DESCRIPTION - PAIN TYPE
TYPE: CHRONIC PAIN;PHANTOM PAIN
TYPE: CHRONIC PAIN

## 2019-12-15 ASSESSMENT — PAIN DESCRIPTION - LOCATION
LOCATION: LEG
LOCATION: LEG

## 2019-12-15 ASSESSMENT — PAIN DESCRIPTION - ONSET: ONSET: ON-GOING

## 2019-12-15 ASSESSMENT — PAIN DESCRIPTION - ORIENTATION: ORIENTATION: RIGHT;LEFT

## 2019-12-15 ASSESSMENT — PAIN DESCRIPTION - FREQUENCY: FREQUENCY: INTERMITTENT

## 2019-12-16 LAB
ALBUMIN SERPL-MCNC: 3.6 G/DL (ref 3.5–5.2)
ALP BLD-CCNC: 72 U/L (ref 35–104)
ALT SERPL-CCNC: 31 U/L (ref 0–32)
ANION GAP SERPL CALCULATED.3IONS-SCNC: 10 MMOL/L (ref 7–16)
AST SERPL-CCNC: 17 U/L (ref 0–31)
BASOPHILS ABSOLUTE: 0.02 E9/L (ref 0–0.2)
BASOPHILS RELATIVE PERCENT: 0.2 % (ref 0–2)
BILIRUB SERPL-MCNC: 0.6 MG/DL (ref 0–1.2)
BUN BLDV-MCNC: 27 MG/DL (ref 8–23)
CALCIUM SERPL-MCNC: 9 MG/DL (ref 8.6–10.2)
CHLORIDE BLD-SCNC: 99 MMOL/L (ref 98–107)
CO2: 26 MMOL/L (ref 22–29)
CREAT SERPL-MCNC: 1 MG/DL (ref 0.5–1)
EKG ATRIAL RATE: 71 BPM
EKG P AXIS: 68 DEGREES
EKG P-R INTERVAL: 184 MS
EKG Q-T INTERVAL: 414 MS
EKG QRS DURATION: 114 MS
EKG QTC CALCULATION (BAZETT): 449 MS
EKG R AXIS: 99 DEGREES
EKG T AXIS: -151 DEGREES
EKG VENTRICULAR RATE: 71 BPM
EOSINOPHILS ABSOLUTE: 0.02 E9/L (ref 0.05–0.5)
EOSINOPHILS RELATIVE PERCENT: 0.2 % (ref 0–6)
GFR AFRICAN AMERICAN: >60
GFR NON-AFRICAN AMERICAN: 56 ML/MIN/1.73
GLUCOSE BLD-MCNC: 240 MG/DL (ref 74–99)
HCT VFR BLD CALC: 33.7 % (ref 34–48)
HEMOGLOBIN: 10 G/DL (ref 11.5–15.5)
IMMATURE GRANULOCYTES #: 0.06 E9/L
IMMATURE GRANULOCYTES %: 0.6 % (ref 0–5)
LYMPHOCYTES ABSOLUTE: 0.73 E9/L (ref 1.5–4)
LYMPHOCYTES RELATIVE PERCENT: 7.8 % (ref 20–42)
MAGNESIUM: 2 MG/DL (ref 1.6–2.6)
MCH RBC QN AUTO: 30.7 PG (ref 26–35)
MCHC RBC AUTO-ENTMCNC: 29.7 % (ref 32–34.5)
MCV RBC AUTO: 103.4 FL (ref 80–99.9)
METER GLUCOSE: 186 MG/DL (ref 74–99)
METER GLUCOSE: 208 MG/DL (ref 74–99)
METER GLUCOSE: 213 MG/DL (ref 74–99)
METER GLUCOSE: 241 MG/DL (ref 74–99)
MONOCYTES ABSOLUTE: 0.45 E9/L (ref 0.1–0.95)
MONOCYTES RELATIVE PERCENT: 4.8 % (ref 2–12)
NEUTROPHILS ABSOLUTE: 8.13 E9/L (ref 1.8–7.3)
NEUTROPHILS RELATIVE PERCENT: 86.4 % (ref 43–80)
PDW BLD-RTO: 15.9 FL (ref 11.5–15)
PHOSPHORUS: 2.7 MG/DL (ref 2.5–4.5)
PLATELET # BLD: 431 E9/L (ref 130–450)
PMV BLD AUTO: 9.5 FL (ref 7–12)
POTASSIUM REFLEX MAGNESIUM: 4.1 MMOL/L (ref 3.5–5)
POTASSIUM SERPL-SCNC: 4.1 MMOL/L (ref 3.5–5)
RBC # BLD: 3.26 E12/L (ref 3.5–5.5)
SODIUM BLD-SCNC: 135 MMOL/L (ref 132–146)
TOTAL PROTEIN: 7 G/DL (ref 6.4–8.3)
URINE CULTURE, ROUTINE: NORMAL
WBC # BLD: 9.4 E9/L (ref 4.5–11.5)

## 2019-12-16 PROCEDURE — 85025 COMPLETE CBC W/AUTO DIFF WBC: CPT

## 2019-12-16 PROCEDURE — 2500000003 HC RX 250 WO HCPCS: Performed by: FAMILY MEDICINE

## 2019-12-16 PROCEDURE — 97530 THERAPEUTIC ACTIVITIES: CPT

## 2019-12-16 PROCEDURE — 6370000000 HC RX 637 (ALT 250 FOR IP): Performed by: INTERNAL MEDICINE

## 2019-12-16 PROCEDURE — 2580000003 HC RX 258: Performed by: INTERNAL MEDICINE

## 2019-12-16 PROCEDURE — 93010 ELECTROCARDIOGRAM REPORT: CPT | Performed by: INTERNAL MEDICINE

## 2019-12-16 PROCEDURE — 94660 CPAP INITIATION&MGMT: CPT

## 2019-12-16 PROCEDURE — 82962 GLUCOSE BLOOD TEST: CPT

## 2019-12-16 PROCEDURE — 99231 SBSQ HOSP IP/OBS SF/LOW 25: CPT | Performed by: INTERNAL MEDICINE

## 2019-12-16 PROCEDURE — 99232 SBSQ HOSP IP/OBS MODERATE 35: CPT | Performed by: INTERNAL MEDICINE

## 2019-12-16 PROCEDURE — 36415 COLL VENOUS BLD VENIPUNCTURE: CPT

## 2019-12-16 PROCEDURE — 83735 ASSAY OF MAGNESIUM: CPT

## 2019-12-16 PROCEDURE — 6360000002 HC RX W HCPCS: Performed by: INTERNAL MEDICINE

## 2019-12-16 PROCEDURE — 2060000000 HC ICU INTERMEDIATE R&B

## 2019-12-16 PROCEDURE — 97162 PT EVAL MOD COMPLEX 30 MIN: CPT

## 2019-12-16 PROCEDURE — 84100 ASSAY OF PHOSPHORUS: CPT

## 2019-12-16 PROCEDURE — 2500000003 HC RX 250 WO HCPCS: Performed by: INTERNAL MEDICINE

## 2019-12-16 PROCEDURE — 94640 AIRWAY INHALATION TREATMENT: CPT

## 2019-12-16 PROCEDURE — 2700000000 HC OXYGEN THERAPY PER DAY

## 2019-12-16 PROCEDURE — 80053 COMPREHEN METABOLIC PANEL: CPT

## 2019-12-16 RX ORDER — GABAPENTIN 300 MG/1
600 CAPSULE ORAL 3 TIMES DAILY
Status: DISCONTINUED | OUTPATIENT
Start: 2019-12-16 | End: 2019-12-18 | Stop reason: HOSPADM

## 2019-12-16 RX ORDER — BUMETANIDE 1 MG/1
1 TABLET ORAL DAILY
Status: DISCONTINUED | OUTPATIENT
Start: 2019-12-17 | End: 2019-12-18 | Stop reason: HOSPADM

## 2019-12-16 RX ORDER — AMOXICILLIN AND CLAVULANATE POTASSIUM 875; 125 MG/1; MG/1
1 TABLET, FILM COATED ORAL EVERY 12 HOURS SCHEDULED
Status: DISCONTINUED | OUTPATIENT
Start: 2019-12-16 | End: 2019-12-18 | Stop reason: HOSPADM

## 2019-12-16 RX ADMIN — INSULIN LISPRO 20 UNITS: 100 INJECTION, SOLUTION INTRAVENOUS; SUBCUTANEOUS at 17:44

## 2019-12-16 RX ADMIN — ATORVASTATIN CALCIUM 80 MG: 40 TABLET, FILM COATED ORAL at 20:31

## 2019-12-16 RX ADMIN — GABAPENTIN 600 MG: 300 CAPSULE ORAL at 20:32

## 2019-12-16 RX ADMIN — GABAPENTIN 700 MG: 300 CAPSULE ORAL at 08:35

## 2019-12-16 RX ADMIN — INSULIN LISPRO 3 UNITS: 100 INJECTION, SOLUTION INTRAVENOUS; SUBCUTANEOUS at 17:48

## 2019-12-16 RX ADMIN — INSULIN LISPRO 6 UNITS: 100 INJECTION, SOLUTION INTRAVENOUS; SUBCUTANEOUS at 08:36

## 2019-12-16 RX ADMIN — HYDROCODONE BITARTRATE AND ACETAMINOPHEN 1 TABLET: 5; 325 TABLET ORAL at 01:45

## 2019-12-16 RX ADMIN — SENNOSIDES 8.6 MG: 8.6 TABLET, FILM COATED ORAL at 20:32

## 2019-12-16 RX ADMIN — IPRATROPIUM BROMIDE AND ALBUTEROL SULFATE 1 AMPULE: 2.5; .5 SOLUTION RESPIRATORY (INHALATION) at 19:55

## 2019-12-16 RX ADMIN — BUMETANIDE 1 MG: 0.25 INJECTION INTRAMUSCULAR; INTRAVENOUS at 08:34

## 2019-12-16 RX ADMIN — HYDROCORTISONE SODIUM SUCCINATE 50 MG: 100 INJECTION, POWDER, FOR SOLUTION INTRAMUSCULAR; INTRAVENOUS at 01:42

## 2019-12-16 RX ADMIN — IPRATROPIUM BROMIDE AND ALBUTEROL SULFATE 1 AMPULE: 2.5; .5 SOLUTION RESPIRATORY (INHALATION) at 16:34

## 2019-12-16 RX ADMIN — ENOXAPARIN SODIUM 40 MG: 40 INJECTION SUBCUTANEOUS at 08:34

## 2019-12-16 RX ADMIN — HYDROCORTISONE SODIUM SUCCINATE 50 MG: 100 INJECTION, POWDER, FOR SOLUTION INTRAMUSCULAR; INTRAVENOUS at 08:35

## 2019-12-16 RX ADMIN — AMOXICILLIN AND CLAVULANATE POTASSIUM 1 TABLET: 875; 125 TABLET, FILM COATED ORAL at 20:32

## 2019-12-16 RX ADMIN — SODIUM CHLORIDE, PRESERVATIVE FREE 10 ML: 5 INJECTION INTRAVENOUS at 08:36

## 2019-12-16 RX ADMIN — BUMETANIDE 1 MG: 0.25 INJECTION INTRAMUSCULAR; INTRAVENOUS at 17:38

## 2019-12-16 RX ADMIN — DOCUSATE SODIUM 100 MG: 100 CAPSULE, LIQUID FILLED ORAL at 08:35

## 2019-12-16 RX ADMIN — CEFEPIME HYDROCHLORIDE 2 G: 2 INJECTION, POWDER, FOR SOLUTION INTRAVENOUS at 00:02

## 2019-12-16 RX ADMIN — INSULIN LISPRO 20 UNITS: 100 INJECTION, SOLUTION INTRAVENOUS; SUBCUTANEOUS at 12:18

## 2019-12-16 RX ADMIN — SODIUM CHLORIDE, PRESERVATIVE FREE 10 ML: 5 INJECTION INTRAVENOUS at 20:33

## 2019-12-16 RX ADMIN — IPRATROPIUM BROMIDE AND ALBUTEROL SULFATE 1 AMPULE: 2.5; .5 SOLUTION RESPIRATORY (INHALATION) at 10:16

## 2019-12-16 RX ADMIN — INSULIN LISPRO 3 UNITS: 100 INJECTION, SOLUTION INTRAVENOUS; SUBCUTANEOUS at 20:33

## 2019-12-16 RX ADMIN — GABAPENTIN 600 MG: 300 CAPSULE ORAL at 14:05

## 2019-12-16 RX ADMIN — HYDROCODONE BITARTRATE AND ACETAMINOPHEN 1 TABLET: 5; 325 TABLET ORAL at 15:03

## 2019-12-16 RX ADMIN — HYDROCORTISONE SODIUM SUCCINATE 50 MG: 100 INJECTION, POWDER, FOR SOLUTION INTRAMUSCULAR; INTRAVENOUS at 20:31

## 2019-12-16 RX ADMIN — HYDROCODONE BITARTRATE AND ACETAMINOPHEN 1 TABLET: 5; 325 TABLET ORAL at 08:52

## 2019-12-16 RX ADMIN — INSULIN LISPRO 20 UNITS: 100 INJECTION, SOLUTION INTRAVENOUS; SUBCUTANEOUS at 06:02

## 2019-12-16 RX ADMIN — INSULIN GLARGINE 65 UNITS: 100 INJECTION, SOLUTION SUBCUTANEOUS at 20:33

## 2019-12-16 RX ADMIN — PANTOPRAZOLE SODIUM 40 MG: 40 TABLET, DELAYED RELEASE ORAL at 06:02

## 2019-12-16 RX ADMIN — FOLIC ACID 1 MG: 1 TABLET ORAL at 08:35

## 2019-12-16 RX ADMIN — ASPIRIN 81 MG: 81 TABLET, COATED ORAL at 08:35

## 2019-12-16 RX ADMIN — INSULIN LISPRO 6 UNITS: 100 INJECTION, SOLUTION INTRAVENOUS; SUBCUTANEOUS at 12:18

## 2019-12-16 ASSESSMENT — PAIN SCALES - GENERAL
PAINLEVEL_OUTOF10: 6
PAINLEVEL_OUTOF10: 3
PAINLEVEL_OUTOF10: 7
PAINLEVEL_OUTOF10: 8
PAINLEVEL_OUTOF10: 0

## 2019-12-16 ASSESSMENT — PAIN DESCRIPTION - DESCRIPTORS
DESCRIPTORS: ACHING;DISCOMFORT
DESCRIPTORS: BURNING
DESCRIPTORS: BURNING

## 2019-12-16 ASSESSMENT — PAIN DESCRIPTION - ORIENTATION
ORIENTATION: RIGHT;LEFT

## 2019-12-16 ASSESSMENT — PAIN DESCRIPTION - LOCATION
LOCATION: LEG

## 2019-12-16 ASSESSMENT — PAIN DESCRIPTION - PAIN TYPE
TYPE: CHRONIC PAIN;PHANTOM PAIN

## 2019-12-16 ASSESSMENT — PAIN DESCRIPTION - ONSET
ONSET: ON-GOING
ONSET: AWAKENED FROM SLEEP
ONSET: ON-GOING

## 2019-12-16 ASSESSMENT — PAIN DESCRIPTION - PROGRESSION
CLINICAL_PROGRESSION: GRADUALLY WORSENING
CLINICAL_PROGRESSION: NOT CHANGED
CLINICAL_PROGRESSION: NOT CHANGED

## 2019-12-16 ASSESSMENT — PAIN - FUNCTIONAL ASSESSMENT
PAIN_FUNCTIONAL_ASSESSMENT: ACTIVITIES ARE NOT PREVENTED
PAIN_FUNCTIONAL_ASSESSMENT: ACTIVITIES ARE NOT PREVENTED
PAIN_FUNCTIONAL_ASSESSMENT: PREVENTS OR INTERFERES SOME ACTIVE ACTIVITIES AND ADLS

## 2019-12-16 ASSESSMENT — PAIN DESCRIPTION - FREQUENCY
FREQUENCY: INTERMITTENT

## 2019-12-17 LAB
ALBUMIN SERPL-MCNC: 3.7 G/DL (ref 3.5–5.2)
ALP BLD-CCNC: 82 U/L (ref 35–104)
ALT SERPL-CCNC: 32 U/L (ref 0–32)
ANION GAP SERPL CALCULATED.3IONS-SCNC: 15 MMOL/L (ref 7–16)
AST SERPL-CCNC: 25 U/L (ref 0–31)
BASOPHILS ABSOLUTE: 0.04 E9/L (ref 0–0.2)
BASOPHILS RELATIVE PERCENT: 0.5 % (ref 0–2)
BILIRUB SERPL-MCNC: 0.4 MG/DL (ref 0–1.2)
BUN BLDV-MCNC: 26 MG/DL (ref 8–23)
CALCIUM SERPL-MCNC: 9.5 MG/DL (ref 8.6–10.2)
CHLORIDE BLD-SCNC: 103 MMOL/L (ref 98–107)
CO2: 30 MMOL/L (ref 22–29)
CREAT SERPL-MCNC: 1 MG/DL (ref 0.5–1)
EOSINOPHILS ABSOLUTE: 0.17 E9/L (ref 0.05–0.5)
EOSINOPHILS RELATIVE PERCENT: 2.1 % (ref 0–6)
GFR AFRICAN AMERICAN: >60
GFR NON-AFRICAN AMERICAN: 56 ML/MIN/1.73
GLUCOSE BLD-MCNC: 155 MG/DL (ref 74–99)
HCT VFR BLD CALC: 34.4 % (ref 34–48)
HEMOGLOBIN: 10.3 G/DL (ref 11.5–15.5)
IMMATURE GRANULOCYTES #: 0.06 E9/L
IMMATURE GRANULOCYTES %: 0.7 % (ref 0–5)
LYMPHOCYTES ABSOLUTE: 1.49 E9/L (ref 1.5–4)
LYMPHOCYTES RELATIVE PERCENT: 18.3 % (ref 20–42)
MAGNESIUM: 1.9 MG/DL (ref 1.6–2.6)
MCH RBC QN AUTO: 31.1 PG (ref 26–35)
MCHC RBC AUTO-ENTMCNC: 29.9 % (ref 32–34.5)
MCV RBC AUTO: 103.9 FL (ref 80–99.9)
METER GLUCOSE: 149 MG/DL (ref 74–99)
METER GLUCOSE: 167 MG/DL (ref 74–99)
METER GLUCOSE: 171 MG/DL (ref 74–99)
METER GLUCOSE: 196 MG/DL (ref 74–99)
MONOCYTES ABSOLUTE: 0.49 E9/L (ref 0.1–0.95)
MONOCYTES RELATIVE PERCENT: 6 % (ref 2–12)
NEUTROPHILS ABSOLUTE: 5.88 E9/L (ref 1.8–7.3)
NEUTROPHILS RELATIVE PERCENT: 72.4 % (ref 43–80)
PDW BLD-RTO: 16.1 FL (ref 11.5–15)
PHOSPHORUS: 4 MG/DL (ref 2.5–4.5)
PLATELET # BLD: 442 E9/L (ref 130–450)
PMV BLD AUTO: 9.3 FL (ref 7–12)
POTASSIUM REFLEX MAGNESIUM: 3.9 MMOL/L (ref 3.5–5)
RBC # BLD: 3.31 E12/L (ref 3.5–5.5)
SODIUM BLD-SCNC: 148 MMOL/L (ref 132–146)
TOTAL PROTEIN: 6.5 G/DL (ref 6.4–8.3)
WBC # BLD: 8.1 E9/L (ref 4.5–11.5)

## 2019-12-17 PROCEDURE — 6370000000 HC RX 637 (ALT 250 FOR IP): Performed by: INTERNAL MEDICINE

## 2019-12-17 PROCEDURE — 2580000003 HC RX 258: Performed by: INTERNAL MEDICINE

## 2019-12-17 PROCEDURE — 84100 ASSAY OF PHOSPHORUS: CPT

## 2019-12-17 PROCEDURE — 94640 AIRWAY INHALATION TREATMENT: CPT

## 2019-12-17 PROCEDURE — 80053 COMPREHEN METABOLIC PANEL: CPT

## 2019-12-17 PROCEDURE — 99232 SBSQ HOSP IP/OBS MODERATE 35: CPT | Performed by: INTERNAL MEDICINE

## 2019-12-17 PROCEDURE — 85025 COMPLETE CBC W/AUTO DIFF WBC: CPT

## 2019-12-17 PROCEDURE — 82962 GLUCOSE BLOOD TEST: CPT

## 2019-12-17 PROCEDURE — 83735 ASSAY OF MAGNESIUM: CPT

## 2019-12-17 PROCEDURE — 2060000000 HC ICU INTERMEDIATE R&B

## 2019-12-17 PROCEDURE — 99231 SBSQ HOSP IP/OBS SF/LOW 25: CPT | Performed by: INTERNAL MEDICINE

## 2019-12-17 PROCEDURE — 97166 OT EVAL MOD COMPLEX 45 MIN: CPT

## 2019-12-17 PROCEDURE — 94660 CPAP INITIATION&MGMT: CPT

## 2019-12-17 PROCEDURE — 36415 COLL VENOUS BLD VENIPUNCTURE: CPT

## 2019-12-17 PROCEDURE — 6360000002 HC RX W HCPCS: Performed by: INTERNAL MEDICINE

## 2019-12-17 PROCEDURE — 6370000000 HC RX 637 (ALT 250 FOR IP): Performed by: FAMILY MEDICINE

## 2019-12-17 PROCEDURE — 97530 THERAPEUTIC ACTIVITIES: CPT

## 2019-12-17 RX ORDER — DEXTROSE MONOHYDRATE 50 MG/ML
INJECTION, SOLUTION INTRAVENOUS CONTINUOUS
Status: DISCONTINUED | OUTPATIENT
Start: 2019-12-17 | End: 2019-12-17

## 2019-12-17 RX ORDER — AMOXICILLIN 250 MG
1 CAPSULE ORAL NIGHTLY
Status: DISCONTINUED | OUTPATIENT
Start: 2019-12-17 | End: 2019-12-18 | Stop reason: HOSPADM

## 2019-12-17 RX ADMIN — INSULIN LISPRO 2 UNITS: 100 INJECTION, SOLUTION INTRAVENOUS; SUBCUTANEOUS at 21:39

## 2019-12-17 RX ADMIN — HYDROCORTISONE SODIUM SUCCINATE 50 MG: 100 INJECTION, POWDER, FOR SOLUTION INTRAMUSCULAR; INTRAVENOUS at 21:38

## 2019-12-17 RX ADMIN — FOLIC ACID 1 MG: 1 TABLET ORAL at 08:46

## 2019-12-17 RX ADMIN — GABAPENTIN 600 MG: 300 CAPSULE ORAL at 08:46

## 2019-12-17 RX ADMIN — PANTOPRAZOLE SODIUM 40 MG: 40 TABLET, DELAYED RELEASE ORAL at 05:48

## 2019-12-17 RX ADMIN — HYDROCODONE BITARTRATE AND ACETAMINOPHEN 1 TABLET: 5; 325 TABLET ORAL at 05:48

## 2019-12-17 RX ADMIN — INSULIN GLARGINE 65 UNITS: 100 INJECTION, SOLUTION SUBCUTANEOUS at 21:37

## 2019-12-17 RX ADMIN — INSULIN LISPRO 20 UNITS: 100 INJECTION, SOLUTION INTRAVENOUS; SUBCUTANEOUS at 12:33

## 2019-12-17 RX ADMIN — GABAPENTIN 600 MG: 300 CAPSULE ORAL at 14:14

## 2019-12-17 RX ADMIN — INSULIN LISPRO 3 UNITS: 100 INJECTION, SOLUTION INTRAVENOUS; SUBCUTANEOUS at 12:33

## 2019-12-17 RX ADMIN — SODIUM CHLORIDE, PRESERVATIVE FREE 10 ML: 5 INJECTION INTRAVENOUS at 08:46

## 2019-12-17 RX ADMIN — ATORVASTATIN CALCIUM 80 MG: 40 TABLET, FILM COATED ORAL at 21:39

## 2019-12-17 RX ADMIN — IPRATROPIUM BROMIDE AND ALBUTEROL SULFATE 1 AMPULE: 2.5; .5 SOLUTION RESPIRATORY (INHALATION) at 15:59

## 2019-12-17 RX ADMIN — BUMETANIDE 1 MG: 1 TABLET ORAL at 08:45

## 2019-12-17 RX ADMIN — ASPIRIN 81 MG: 81 TABLET, COATED ORAL at 08:45

## 2019-12-17 RX ADMIN — INSULIN LISPRO 20 UNITS: 100 INJECTION, SOLUTION INTRAVENOUS; SUBCUTANEOUS at 08:48

## 2019-12-17 RX ADMIN — IPRATROPIUM BROMIDE AND ALBUTEROL SULFATE 1 AMPULE: 2.5; .5 SOLUTION RESPIRATORY (INHALATION) at 20:46

## 2019-12-17 RX ADMIN — GABAPENTIN 600 MG: 300 CAPSULE ORAL at 21:38

## 2019-12-17 RX ADMIN — IPRATROPIUM BROMIDE AND ALBUTEROL SULFATE 1 AMPULE: 2.5; .5 SOLUTION RESPIRATORY (INHALATION) at 11:47

## 2019-12-17 RX ADMIN — AMOXICILLIN AND CLAVULANATE POTASSIUM 1 TABLET: 875; 125 TABLET, FILM COATED ORAL at 08:46

## 2019-12-17 RX ADMIN — INSULIN LISPRO 20 UNITS: 100 INJECTION, SOLUTION INTRAVENOUS; SUBCUTANEOUS at 17:55

## 2019-12-17 RX ADMIN — SENNOSIDES AND DOCUSATE SODIUM 1 TABLET: 8.6; 5 TABLET ORAL at 21:39

## 2019-12-17 RX ADMIN — SODIUM CHLORIDE, PRESERVATIVE FREE 10 ML: 5 INJECTION INTRAVENOUS at 21:50

## 2019-12-17 RX ADMIN — INSULIN LISPRO 3 UNITS: 100 INJECTION, SOLUTION INTRAVENOUS; SUBCUTANEOUS at 17:52

## 2019-12-17 RX ADMIN — HYDROCODONE BITARTRATE AND ACETAMINOPHEN 1 TABLET: 5; 325 TABLET ORAL at 16:25

## 2019-12-17 RX ADMIN — INSULIN LISPRO 3 UNITS: 100 INJECTION, SOLUTION INTRAVENOUS; SUBCUTANEOUS at 08:49

## 2019-12-17 RX ADMIN — ENOXAPARIN SODIUM 40 MG: 40 INJECTION SUBCUTANEOUS at 08:45

## 2019-12-17 RX ADMIN — AMOXICILLIN AND CLAVULANATE POTASSIUM 1 TABLET: 875; 125 TABLET, FILM COATED ORAL at 21:38

## 2019-12-17 RX ADMIN — HYDROCORTISONE SODIUM SUCCINATE 50 MG: 100 INJECTION, POWDER, FOR SOLUTION INTRAMUSCULAR; INTRAVENOUS at 08:46

## 2019-12-17 ASSESSMENT — PAIN SCALES - GENERAL
PAINLEVEL_OUTOF10: 7
PAINLEVEL_OUTOF10: 7

## 2019-12-18 VITALS
TEMPERATURE: 97.4 F | OXYGEN SATURATION: 96 % | WEIGHT: 222 LBS | RESPIRATION RATE: 16 BRPM | HEIGHT: 60 IN | HEART RATE: 94 BPM | DIASTOLIC BLOOD PRESSURE: 66 MMHG | BODY MASS INDEX: 43.59 KG/M2 | SYSTOLIC BLOOD PRESSURE: 136 MMHG

## 2019-12-18 LAB
ALBUMIN SERPL-MCNC: 3.4 G/DL (ref 3.5–5.2)
ALP BLD-CCNC: 82 U/L (ref 35–104)
ALT SERPL-CCNC: 31 U/L (ref 0–32)
ANION GAP SERPL CALCULATED.3IONS-SCNC: 16 MMOL/L (ref 7–16)
AST SERPL-CCNC: 24 U/L (ref 0–31)
BASOPHILS ABSOLUTE: 0.04 E9/L (ref 0–0.2)
BASOPHILS RELATIVE PERCENT: 0.5 % (ref 0–2)
BILIRUB SERPL-MCNC: 0.4 MG/DL (ref 0–1.2)
BLOOD CULTURE, ROUTINE: NORMAL
BUN BLDV-MCNC: 29 MG/DL (ref 8–23)
CALCIUM SERPL-MCNC: 9.1 MG/DL (ref 8.6–10.2)
CHLORIDE BLD-SCNC: 98 MMOL/L (ref 98–107)
CO2: 27 MMOL/L (ref 22–29)
CREAT SERPL-MCNC: 1.1 MG/DL (ref 0.5–1)
CULTURE, BLOOD 2: NORMAL
EOSINOPHILS ABSOLUTE: 0.13 E9/L (ref 0.05–0.5)
EOSINOPHILS RELATIVE PERCENT: 1.6 % (ref 0–6)
GFR AFRICAN AMERICAN: >60
GFR NON-AFRICAN AMERICAN: 50 ML/MIN/1.73
GLUCOSE BLD-MCNC: 340 MG/DL (ref 74–99)
HCT VFR BLD CALC: 33 % (ref 34–48)
HEMOGLOBIN: 9.7 G/DL (ref 11.5–15.5)
IMMATURE GRANULOCYTES #: 0.08 E9/L
IMMATURE GRANULOCYTES %: 1 % (ref 0–5)
LYMPHOCYTES ABSOLUTE: 1.31 E9/L (ref 1.5–4)
LYMPHOCYTES RELATIVE PERCENT: 15.7 % (ref 20–42)
MAGNESIUM: 1.6 MG/DL (ref 1.6–2.6)
MCH RBC QN AUTO: 30.7 PG (ref 26–35)
MCHC RBC AUTO-ENTMCNC: 29.4 % (ref 32–34.5)
MCV RBC AUTO: 104.4 FL (ref 80–99.9)
METER GLUCOSE: 146 MG/DL (ref 74–99)
METER GLUCOSE: 196 MG/DL (ref 74–99)
METER GLUCOSE: 230 MG/DL (ref 74–99)
METER GLUCOSE: 297 MG/DL (ref 74–99)
MONOCYTES ABSOLUTE: 0.52 E9/L (ref 0.1–0.95)
MONOCYTES RELATIVE PERCENT: 6.2 % (ref 2–12)
NEUTROPHILS ABSOLUTE: 6.29 E9/L (ref 1.8–7.3)
NEUTROPHILS RELATIVE PERCENT: 75 % (ref 43–80)
PDW BLD-RTO: 16 FL (ref 11.5–15)
PHOSPHORUS: 4.2 MG/DL (ref 2.5–4.5)
PLATELET # BLD: 401 E9/L (ref 130–450)
PMV BLD AUTO: 9.6 FL (ref 7–12)
POTASSIUM REFLEX MAGNESIUM: 3.7 MMOL/L (ref 3.5–5)
RBC # BLD: 3.16 E12/L (ref 3.5–5.5)
SODIUM BLD-SCNC: 141 MMOL/L (ref 132–146)
TOTAL PROTEIN: 5.8 G/DL (ref 6.4–8.3)
WBC # BLD: 8.4 E9/L (ref 4.5–11.5)

## 2019-12-18 PROCEDURE — 94640 AIRWAY INHALATION TREATMENT: CPT

## 2019-12-18 PROCEDURE — 83735 ASSAY OF MAGNESIUM: CPT

## 2019-12-18 PROCEDURE — 85025 COMPLETE CBC W/AUTO DIFF WBC: CPT

## 2019-12-18 PROCEDURE — 6370000000 HC RX 637 (ALT 250 FOR IP): Performed by: INTERNAL MEDICINE

## 2019-12-18 PROCEDURE — 36415 COLL VENOUS BLD VENIPUNCTURE: CPT

## 2019-12-18 PROCEDURE — 2580000003 HC RX 258: Performed by: INTERNAL MEDICINE

## 2019-12-18 PROCEDURE — 94760 N-INVAS EAR/PLS OXIMETRY 1: CPT

## 2019-12-18 PROCEDURE — 99231 SBSQ HOSP IP/OBS SF/LOW 25: CPT | Performed by: INTERNAL MEDICINE

## 2019-12-18 PROCEDURE — 36592 COLLECT BLOOD FROM PICC: CPT

## 2019-12-18 PROCEDURE — 80053 COMPREHEN METABOLIC PANEL: CPT

## 2019-12-18 PROCEDURE — 6360000002 HC RX W HCPCS: Performed by: INTERNAL MEDICINE

## 2019-12-18 PROCEDURE — 94660 CPAP INITIATION&MGMT: CPT

## 2019-12-18 PROCEDURE — 84100 ASSAY OF PHOSPHORUS: CPT

## 2019-12-18 PROCEDURE — 6370000000 HC RX 637 (ALT 250 FOR IP): Performed by: FAMILY MEDICINE

## 2019-12-18 PROCEDURE — 82962 GLUCOSE BLOOD TEST: CPT

## 2019-12-18 RX ORDER — MAGNESIUM SULFATE IN WATER 40 MG/ML
2 INJECTION, SOLUTION INTRAVENOUS ONCE
Status: COMPLETED | OUTPATIENT
Start: 2019-12-18 | End: 2019-12-18

## 2019-12-18 RX ORDER — BUMETANIDE 1 MG/1
1 TABLET ORAL DAILY
Qty: 30 TABLET | Refills: 3 | Status: SHIPPED | OUTPATIENT
Start: 2019-12-19 | End: 2020-05-13 | Stop reason: SDUPTHER

## 2019-12-18 RX ORDER — ISOSORBIDE MONONITRATE 30 MG/1
30 TABLET, EXTENDED RELEASE ORAL DAILY
Status: DISCONTINUED | OUTPATIENT
Start: 2019-12-18 | End: 2019-12-18 | Stop reason: HOSPADM

## 2019-12-18 RX ORDER — METOPROLOL SUCCINATE 25 MG/1
25 TABLET, EXTENDED RELEASE ORAL DAILY
Qty: 30 TABLET | Refills: 3 | Status: SHIPPED | OUTPATIENT
Start: 2019-12-19 | End: 2019-12-23 | Stop reason: ALTCHOICE

## 2019-12-18 RX ORDER — METOPROLOL SUCCINATE 25 MG/1
25 TABLET, EXTENDED RELEASE ORAL DAILY
Status: DISCONTINUED | OUTPATIENT
Start: 2019-12-18 | End: 2019-12-18 | Stop reason: HOSPADM

## 2019-12-18 RX ORDER — ISOSORBIDE MONONITRATE 30 MG/1
30 TABLET, EXTENDED RELEASE ORAL DAILY
Qty: 30 TABLET | Refills: 3 | Status: SHIPPED | OUTPATIENT
Start: 2019-12-19 | End: 2020-06-15 | Stop reason: ALTCHOICE

## 2019-12-18 RX ORDER — BACITRACIN 500 [USP'U]/G
OINTMENT TOPICAL
Qty: 1 TUBE | Refills: 1 | Status: SHIPPED | OUTPATIENT
Start: 2019-12-18 | End: 2019-12-23 | Stop reason: ALTCHOICE

## 2019-12-18 RX ORDER — FOLIC ACID 1 MG/1
1 TABLET ORAL DAILY
Qty: 30 TABLET | Refills: 3 | Status: SHIPPED | OUTPATIENT
Start: 2019-12-19 | End: 2020-06-15 | Stop reason: SDUPTHER

## 2019-12-18 RX ADMIN — INSULIN LISPRO 3 UNITS: 100 INJECTION, SOLUTION INTRAVENOUS; SUBCUTANEOUS at 17:28

## 2019-12-18 RX ADMIN — BUMETANIDE 1 MG: 1 TABLET ORAL at 09:43

## 2019-12-18 RX ADMIN — IPRATROPIUM BROMIDE AND ALBUTEROL SULFATE 1 AMPULE: 2.5; .5 SOLUTION RESPIRATORY (INHALATION) at 17:03

## 2019-12-18 RX ADMIN — MAGNESIUM SULFATE HEPTAHYDRATE 2 G: 40 INJECTION, SOLUTION INTRAVENOUS at 12:12

## 2019-12-18 RX ADMIN — HYDROCODONE BITARTRATE AND ACETAMINOPHEN 1 TABLET: 5; 325 TABLET ORAL at 17:38

## 2019-12-18 RX ADMIN — POTASSIUM BICARBONATE 40 MEQ: 782 TABLET, EFFERVESCENT ORAL at 12:12

## 2019-12-18 RX ADMIN — HYDROCORTISONE SODIUM SUCCINATE 50 MG: 100 INJECTION, POWDER, FOR SOLUTION INTRAMUSCULAR; INTRAVENOUS at 09:43

## 2019-12-18 RX ADMIN — SODIUM CHLORIDE, PRESERVATIVE FREE 10 ML: 5 INJECTION INTRAVENOUS at 09:44

## 2019-12-18 RX ADMIN — INSULIN LISPRO 9 UNITS: 100 INJECTION, SOLUTION INTRAVENOUS; SUBCUTANEOUS at 06:41

## 2019-12-18 RX ADMIN — ENOXAPARIN SODIUM 40 MG: 40 INJECTION SUBCUTANEOUS at 09:43

## 2019-12-18 RX ADMIN — HYDROCODONE BITARTRATE AND ACETAMINOPHEN 1 TABLET: 5; 325 TABLET ORAL at 06:44

## 2019-12-18 RX ADMIN — ASPIRIN 81 MG: 81 TABLET, COATED ORAL at 09:43

## 2019-12-18 RX ADMIN — FOLIC ACID 1 MG: 1 TABLET ORAL at 09:43

## 2019-12-18 RX ADMIN — INSULIN LISPRO 20 UNITS: 100 INJECTION, SOLUTION INTRAVENOUS; SUBCUTANEOUS at 11:34

## 2019-12-18 RX ADMIN — INSULIN LISPRO 20 UNITS: 100 INJECTION, SOLUTION INTRAVENOUS; SUBCUTANEOUS at 06:40

## 2019-12-18 RX ADMIN — ISOSORBIDE MONONITRATE 30 MG: 30 TABLET ORAL at 09:42

## 2019-12-18 RX ADMIN — INSULIN LISPRO 6 UNITS: 100 INJECTION, SOLUTION INTRAVENOUS; SUBCUTANEOUS at 11:33

## 2019-12-18 RX ADMIN — AMOXICILLIN AND CLAVULANATE POTASSIUM 1 TABLET: 875; 125 TABLET, FILM COATED ORAL at 09:42

## 2019-12-18 RX ADMIN — HYDROCODONE BITARTRATE AND ACETAMINOPHEN 1 TABLET: 5; 325 TABLET ORAL at 00:16

## 2019-12-18 RX ADMIN — GABAPENTIN 600 MG: 300 CAPSULE ORAL at 09:43

## 2019-12-18 RX ADMIN — GABAPENTIN 600 MG: 300 CAPSULE ORAL at 14:10

## 2019-12-18 RX ADMIN — INSULIN LISPRO 20 UNITS: 100 INJECTION, SOLUTION INTRAVENOUS; SUBCUTANEOUS at 17:27

## 2019-12-18 RX ADMIN — PANTOPRAZOLE SODIUM 40 MG: 40 TABLET, DELAYED RELEASE ORAL at 06:33

## 2019-12-18 RX ADMIN — IPRATROPIUM BROMIDE AND ALBUTEROL SULFATE 1 AMPULE: 2.5; .5 SOLUTION RESPIRATORY (INHALATION) at 10:57

## 2019-12-18 RX ADMIN — METOPROLOL SUCCINATE 25 MG: 25 TABLET, EXTENDED RELEASE ORAL at 09:43

## 2019-12-18 ASSESSMENT — PAIN DESCRIPTION - DESCRIPTORS: DESCRIPTORS: DISCOMFORT

## 2019-12-18 ASSESSMENT — PAIN SCALES - GENERAL
PAINLEVEL_OUTOF10: 7
PAINLEVEL_OUTOF10: 0

## 2019-12-18 ASSESSMENT — PAIN DESCRIPTION - LOCATION
LOCATION: LEG
LOCATION: LEG

## 2019-12-18 ASSESSMENT — PAIN DESCRIPTION - FREQUENCY: FREQUENCY: INTERMITTENT

## 2019-12-18 ASSESSMENT — PAIN DESCRIPTION - PAIN TYPE
TYPE: CHRONIC PAIN
TYPE: CHRONIC PAIN

## 2019-12-18 ASSESSMENT — PAIN DESCRIPTION - ORIENTATION
ORIENTATION: RIGHT;LEFT
ORIENTATION: RIGHT;LEFT

## 2019-12-18 ASSESSMENT — PAIN DESCRIPTION - ONSET
ONSET: ON-GOING
ONSET: ON-GOING

## 2019-12-18 ASSESSMENT — PAIN DESCRIPTION - PROGRESSION
CLINICAL_PROGRESSION: NOT CHANGED
CLINICAL_PROGRESSION: NOT CHANGED

## 2019-12-18 ASSESSMENT — PAIN - FUNCTIONAL ASSESSMENT: PAIN_FUNCTIONAL_ASSESSMENT: PREVENTS OR INTERFERES WITH MANY ACTIVE NOT PASSIVE ACTIVITIES

## 2019-12-19 ENCOUNTER — TELEPHONE (OUTPATIENT)
Dept: INTERNAL MEDICINE | Age: 59
End: 2019-12-19

## 2019-12-23 ENCOUNTER — APPOINTMENT (OUTPATIENT)
Dept: GENERAL RADIOLOGY | Age: 59
DRG: 194 | End: 2019-12-23
Payer: MEDICAID

## 2019-12-23 ENCOUNTER — HOSPITAL ENCOUNTER (INPATIENT)
Age: 59
LOS: 10 days | Discharge: HOME HEALTH | DRG: 194 | End: 2020-01-02
Attending: EMERGENCY MEDICINE | Admitting: INTERNAL MEDICINE
Payer: MEDICAID

## 2019-12-23 ENCOUNTER — APPOINTMENT (OUTPATIENT)
Dept: CT IMAGING | Age: 59
DRG: 194 | End: 2019-12-23
Payer: MEDICAID

## 2019-12-23 PROBLEM — R09.02 HYPOXIA: Status: ACTIVE | Noted: 2019-12-23

## 2019-12-23 LAB
ALBUMIN SERPL-MCNC: 3.5 G/DL (ref 3.5–5.2)
ALP BLD-CCNC: 90 U/L (ref 35–104)
ALT SERPL-CCNC: 37 U/L (ref 0–32)
ANION GAP SERPL CALCULATED.3IONS-SCNC: 14 MMOL/L (ref 7–16)
ANISOCYTOSIS: ABNORMAL
AST SERPL-CCNC: 35 U/L (ref 0–31)
BACTERIA: ABNORMAL /HPF
BASOPHILS ABSOLUTE: 0 E9/L (ref 0–0.2)
BASOPHILS RELATIVE PERCENT: 0.7 % (ref 0–2)
BILIRUB SERPL-MCNC: 0.6 MG/DL (ref 0–1.2)
BILIRUBIN URINE: ABNORMAL
BLOOD, URINE: NEGATIVE
BUN BLDV-MCNC: 32 MG/DL (ref 8–23)
CALCIUM SERPL-MCNC: 9 MG/DL (ref 8.6–10.2)
CHLORIDE BLD-SCNC: 96 MMOL/L (ref 98–107)
CLARITY: CLEAR
CO2: 30 MMOL/L (ref 22–29)
COLOR: YELLOW
CREAT SERPL-MCNC: 1.4 MG/DL (ref 0.5–1)
EOSINOPHILS ABSOLUTE: 0.24 E9/L (ref 0.05–0.5)
EOSINOPHILS RELATIVE PERCENT: 2.7 % (ref 0–6)
GFR AFRICAN AMERICAN: 46
GFR NON-AFRICAN AMERICAN: 38 ML/MIN/1.73
GLUCOSE BLD-MCNC: 253 MG/DL (ref 74–99)
GLUCOSE URINE: NEGATIVE MG/DL
HCT VFR BLD CALC: 33.8 % (ref 34–48)
HEMOGLOBIN: 9.7 G/DL (ref 11.5–15.5)
HYPOCHROMIA: ABNORMAL
INFLUENZA A BY PCR: NOT DETECTED
INFLUENZA B BY PCR: NOT DETECTED
KETONES, URINE: NEGATIVE MG/DL
LACTIC ACID: 2.1 MMOL/L (ref 0.5–2.2)
LACTIC ACID: 2.3 MMOL/L (ref 0.5–2.2)
LEUKOCYTE ESTERASE, URINE: ABNORMAL
LYMPHOCYTES ABSOLUTE: 1.07 E9/L (ref 1.5–4)
LYMPHOCYTES RELATIVE PERCENT: 12.4 % (ref 20–42)
MCH RBC QN AUTO: 30.6 PG (ref 26–35)
MCHC RBC AUTO-ENTMCNC: 28.7 % (ref 32–34.5)
MCV RBC AUTO: 106.6 FL (ref 80–99.9)
METER GLUCOSE: 245 MG/DL (ref 74–99)
METER GLUCOSE: 330 MG/DL (ref 74–99)
METER GLUCOSE: 346 MG/DL (ref 74–99)
MONOCYTES ABSOLUTE: 0.44 E9/L (ref 0.1–0.95)
MONOCYTES RELATIVE PERCENT: 5.3 % (ref 2–12)
NEUTROPHILS ABSOLUTE: 7.12 E9/L (ref 1.8–7.3)
NEUTROPHILS RELATIVE PERCENT: 79.6 % (ref 43–80)
NITRITE, URINE: NEGATIVE
OVALOCYTES: ABNORMAL
PDW BLD-RTO: 15.6 FL (ref 11.5–15)
PH UA: 5 (ref 5–9)
PLATELET # BLD: 275 E9/L (ref 130–450)
PMV BLD AUTO: 9.5 FL (ref 7–12)
POIKILOCYTES: ABNORMAL
POLYCHROMASIA: ABNORMAL
POTASSIUM SERPL-SCNC: 4.8 MMOL/L (ref 3.5–5)
PRO-BNP: 7840 PG/ML (ref 0–125)
PROTEIN UA: 100 MG/DL
RBC # BLD: 3.17 E12/L (ref 3.5–5.5)
RBC UA: ABNORMAL /HPF (ref 0–2)
SODIUM BLD-SCNC: 140 MMOL/L (ref 132–146)
SPECIFIC GRAVITY UA: >=1.03 (ref 1–1.03)
STOMATOCYTES: ABNORMAL
TOTAL PROTEIN: 6.5 G/DL (ref 6.4–8.3)
TROPONIN: 0.08 NG/ML (ref 0–0.03)
UROBILINOGEN, URINE: 1 E.U./DL
WBC # BLD: 8.9 E9/L (ref 4.5–11.5)
WBC UA: ABNORMAL /HPF (ref 0–5)

## 2019-12-23 PROCEDURE — 6370000000 HC RX 637 (ALT 250 FOR IP): Performed by: INTERNAL MEDICINE

## 2019-12-23 PROCEDURE — 85025 COMPLETE CBC W/AUTO DIFF WBC: CPT

## 2019-12-23 PROCEDURE — 87040 BLOOD CULTURE FOR BACTERIA: CPT

## 2019-12-23 PROCEDURE — 87502 INFLUENZA DNA AMP PROBE: CPT

## 2019-12-23 PROCEDURE — 99285 EMERGENCY DEPT VISIT HI MDM: CPT

## 2019-12-23 PROCEDURE — 2580000003 HC RX 258: Performed by: INTERNAL MEDICINE

## 2019-12-23 PROCEDURE — 82962 GLUCOSE BLOOD TEST: CPT

## 2019-12-23 PROCEDURE — 81001 URINALYSIS AUTO W/SCOPE: CPT

## 2019-12-23 PROCEDURE — 83880 ASSAY OF NATRIURETIC PEPTIDE: CPT

## 2019-12-23 PROCEDURE — 71045 X-RAY EXAM CHEST 1 VIEW: CPT

## 2019-12-23 PROCEDURE — 83605 ASSAY OF LACTIC ACID: CPT

## 2019-12-23 PROCEDURE — 80053 COMPREHEN METABOLIC PANEL: CPT

## 2019-12-23 PROCEDURE — 6360000002 HC RX W HCPCS: Performed by: INTERNAL MEDICINE

## 2019-12-23 PROCEDURE — 70450 CT HEAD/BRAIN W/O DYE: CPT

## 2019-12-23 PROCEDURE — 2580000003 HC RX 258: Performed by: STUDENT IN AN ORGANIZED HEALTH CARE EDUCATION/TRAINING PROGRAM

## 2019-12-23 PROCEDURE — 84484 ASSAY OF TROPONIN QUANT: CPT

## 2019-12-23 PROCEDURE — 93005 ELECTROCARDIOGRAM TRACING: CPT | Performed by: STUDENT IN AN ORGANIZED HEALTH CARE EDUCATION/TRAINING PROGRAM

## 2019-12-23 PROCEDURE — 36415 COLL VENOUS BLD VENIPUNCTURE: CPT

## 2019-12-23 PROCEDURE — 87088 URINE BACTERIA CULTURE: CPT

## 2019-12-23 PROCEDURE — 2700000000 HC OXYGEN THERAPY PER DAY

## 2019-12-23 PROCEDURE — 2140000000 HC CCU INTERMEDIATE R&B

## 2019-12-23 RX ORDER — NICOTINE POLACRILEX 4 MG
15 LOZENGE BUCCAL PRN
Status: DISCONTINUED | OUTPATIENT
Start: 2019-12-23 | End: 2020-01-02 | Stop reason: HOSPADM

## 2019-12-23 RX ORDER — LISINOPRIL 5 MG/1
5 TABLET ORAL DAILY
COMMUNITY
End: 2020-02-17

## 2019-12-23 RX ORDER — ATORVASTATIN CALCIUM 40 MG/1
40 TABLET, FILM COATED ORAL DAILY
COMMUNITY
End: 2020-06-15 | Stop reason: SDUPTHER

## 2019-12-23 RX ORDER — PANTOPRAZOLE SODIUM 40 MG/1
40 TABLET, DELAYED RELEASE ORAL
Status: DISCONTINUED | OUTPATIENT
Start: 2019-12-24 | End: 2020-01-02 | Stop reason: HOSPADM

## 2019-12-23 RX ORDER — ACETAMINOPHEN 325 MG/1
650 TABLET ORAL EVERY 4 HOURS PRN
Status: DISCONTINUED | OUTPATIENT
Start: 2019-12-23 | End: 2019-12-23

## 2019-12-23 RX ORDER — GABAPENTIN 300 MG/1
600 CAPSULE ORAL 3 TIMES DAILY
Status: DISCONTINUED | OUTPATIENT
Start: 2019-12-23 | End: 2019-12-28

## 2019-12-23 RX ORDER — CLOPIDOGREL BISULFATE 75 MG/1
75 TABLET ORAL DAILY
COMMUNITY
End: 2020-06-15 | Stop reason: SDUPTHER

## 2019-12-23 RX ORDER — 0.9 % SODIUM CHLORIDE 0.9 %
500 INTRAVENOUS SOLUTION INTRAVENOUS ONCE
Status: COMPLETED | OUTPATIENT
Start: 2019-12-23 | End: 2019-12-23

## 2019-12-23 RX ORDER — AMLODIPINE BESYLATE 2.5 MG/1
2.5 TABLET ORAL DAILY
COMMUNITY
End: 2020-06-15 | Stop reason: SDUPTHER

## 2019-12-23 RX ORDER — ISOSORBIDE MONONITRATE 30 MG/1
30 TABLET, EXTENDED RELEASE ORAL DAILY
Status: DISCONTINUED | OUTPATIENT
Start: 2019-12-23 | End: 2020-01-02 | Stop reason: HOSPADM

## 2019-12-23 RX ORDER — DEXTROSE MONOHYDRATE 50 MG/ML
100 INJECTION, SOLUTION INTRAVENOUS PRN
Status: DISCONTINUED | OUTPATIENT
Start: 2019-12-23 | End: 2020-01-02 | Stop reason: HOSPADM

## 2019-12-23 RX ORDER — METOPROLOL TARTRATE 100 MG/1
100 TABLET ORAL 2 TIMES DAILY
COMMUNITY
End: 2020-06-15 | Stop reason: SDUPTHER

## 2019-12-23 RX ORDER — DOCUSATE SODIUM 100 MG/1
100 CAPSULE, LIQUID FILLED ORAL DAILY PRN
Status: DISCONTINUED | OUTPATIENT
Start: 2019-12-23 | End: 2020-01-02 | Stop reason: HOSPADM

## 2019-12-23 RX ORDER — NITROGLYCERIN 0.4 MG/1
0.4 TABLET SUBLINGUAL EVERY 5 MIN PRN
COMMUNITY
End: 2020-06-15 | Stop reason: SDUPTHER

## 2019-12-23 RX ORDER — CLOPIDOGREL BISULFATE 75 MG/1
75 TABLET ORAL DAILY
Status: DISCONTINUED | OUTPATIENT
Start: 2019-12-23 | End: 2020-01-02 | Stop reason: HOSPADM

## 2019-12-23 RX ORDER — SODIUM CHLORIDE 0.9 % (FLUSH) 0.9 %
10 SYRINGE (ML) INJECTION EVERY 12 HOURS SCHEDULED
Status: DISCONTINUED | OUTPATIENT
Start: 2019-12-23 | End: 2020-01-02 | Stop reason: HOSPADM

## 2019-12-23 RX ORDER — DEXTROSE MONOHYDRATE 25 G/50ML
12.5 INJECTION, SOLUTION INTRAVENOUS PRN
Status: DISCONTINUED | OUTPATIENT
Start: 2019-12-23 | End: 2020-01-02 | Stop reason: HOSPADM

## 2019-12-23 RX ORDER — ASPIRIN 81 MG/1
81 TABLET ORAL DAILY
COMMUNITY
End: 2020-02-17

## 2019-12-23 RX ORDER — FOLIC ACID 1 MG/1
1 TABLET ORAL DAILY
Status: DISCONTINUED | OUTPATIENT
Start: 2019-12-23 | End: 2020-01-02 | Stop reason: HOSPADM

## 2019-12-23 RX ORDER — ONDANSETRON 2 MG/ML
4 INJECTION INTRAMUSCULAR; INTRAVENOUS EVERY 6 HOURS PRN
Status: DISCONTINUED | OUTPATIENT
Start: 2019-12-23 | End: 2020-01-02 | Stop reason: HOSPADM

## 2019-12-23 RX ORDER — ACETAMINOPHEN 325 MG/1
650 TABLET ORAL EVERY 4 HOURS PRN
Status: DISCONTINUED | OUTPATIENT
Start: 2019-12-23 | End: 2020-01-02 | Stop reason: HOSPADM

## 2019-12-23 RX ORDER — RANOLAZINE 500 MG/1
1000 TABLET, EXTENDED RELEASE ORAL 2 TIMES DAILY
Status: DISCONTINUED | OUTPATIENT
Start: 2019-12-23 | End: 2020-01-02 | Stop reason: HOSPADM

## 2019-12-23 RX ORDER — INSULIN GLARGINE 100 [IU]/ML
65 INJECTION, SOLUTION SUBCUTANEOUS NIGHTLY
Status: DISCONTINUED | OUTPATIENT
Start: 2019-12-23 | End: 2020-01-02 | Stop reason: HOSPADM

## 2019-12-23 RX ORDER — ACETAMINOPHEN 325 MG/1
325 TABLET ORAL EVERY 6 HOURS PRN
COMMUNITY
End: 2020-06-15 | Stop reason: ALTCHOICE

## 2019-12-23 RX ORDER — SODIUM CHLORIDE 0.9 % (FLUSH) 0.9 %
10 SYRINGE (ML) INJECTION PRN
Status: DISCONTINUED | OUTPATIENT
Start: 2019-12-23 | End: 2020-01-02 | Stop reason: HOSPADM

## 2019-12-23 RX ORDER — ATORVASTATIN CALCIUM 40 MG/1
40 TABLET, FILM COATED ORAL DAILY
Status: DISCONTINUED | OUTPATIENT
Start: 2019-12-23 | End: 2020-01-02 | Stop reason: HOSPADM

## 2019-12-23 RX ORDER — SERTRALINE HYDROCHLORIDE 100 MG/1
100 TABLET, FILM COATED ORAL DAILY
Status: DISCONTINUED | OUTPATIENT
Start: 2019-12-23 | End: 2020-01-02 | Stop reason: HOSPADM

## 2019-12-23 RX ORDER — DOCUSATE SODIUM 100 MG/1
100 CAPSULE, LIQUID FILLED ORAL DAILY PRN
COMMUNITY
End: 2020-06-15 | Stop reason: SDUPTHER

## 2019-12-23 RX ORDER — ASPIRIN 81 MG/1
81 TABLET ORAL DAILY
Status: DISCONTINUED | OUTPATIENT
Start: 2019-12-23 | End: 2020-01-02 | Stop reason: HOSPADM

## 2019-12-23 RX ORDER — SERTRALINE HYDROCHLORIDE 100 MG/1
100 TABLET, FILM COATED ORAL DAILY
COMMUNITY
End: 2020-06-15 | Stop reason: SDUPTHER

## 2019-12-23 RX ADMIN — INSULIN GLARGINE 65 UNITS: 100 INJECTION, SOLUTION SUBCUTANEOUS at 21:01

## 2019-12-23 RX ADMIN — FOLIC ACID 1 MG: 1 TABLET ORAL at 12:58

## 2019-12-23 RX ADMIN — ACETAMINOPHEN 650 MG: 325 TABLET, FILM COATED ORAL at 23:03

## 2019-12-23 RX ADMIN — SERTRALINE 100 MG: 100 TABLET, FILM COATED ORAL at 15:43

## 2019-12-23 RX ADMIN — RANOLAZINE 1000 MG: 500 TABLET, FILM COATED, EXTENDED RELEASE ORAL at 21:01

## 2019-12-23 RX ADMIN — CLOPIDOGREL 75 MG: 75 TABLET, FILM COATED ORAL at 12:58

## 2019-12-23 RX ADMIN — ISOSORBIDE MONONITRATE 30 MG: 30 TABLET ORAL at 12:58

## 2019-12-23 RX ADMIN — GABAPENTIN 600 MG: 300 CAPSULE ORAL at 15:43

## 2019-12-23 RX ADMIN — SODIUM CHLORIDE 500 ML: 9 INJECTION, SOLUTION INTRAVENOUS at 08:28

## 2019-12-23 RX ADMIN — ASPIRIN 81 MG: 81 TABLET, COATED ORAL at 12:58

## 2019-12-23 RX ADMIN — ENOXAPARIN SODIUM 40 MG: 40 INJECTION SUBCUTANEOUS at 12:58

## 2019-12-23 RX ADMIN — ATORVASTATIN CALCIUM 40 MG: 40 TABLET, FILM COATED ORAL at 12:58

## 2019-12-23 RX ADMIN — GABAPENTIN 600 MG: 300 CAPSULE ORAL at 21:01

## 2019-12-23 RX ADMIN — SODIUM CHLORIDE, PRESERVATIVE FREE 10 ML: 5 INJECTION INTRAVENOUS at 21:15

## 2019-12-23 RX ADMIN — RANOLAZINE 1000 MG: 500 TABLET, FILM COATED, EXTENDED RELEASE ORAL at 15:43

## 2019-12-23 RX ADMIN — INSULIN LISPRO 18 UNITS: 100 INJECTION, SOLUTION INTRAVENOUS; SUBCUTANEOUS at 17:13

## 2019-12-23 ASSESSMENT — ENCOUNTER SYMPTOMS
BLOOD IN STOOL: 0
SHORTNESS OF BREATH: 0
NAUSEA: 0
DIARRHEA: 0
COUGH: 0
WHEEZING: 0
CONSTIPATION: 0
CHEST TIGHTNESS: 0
SORE THROAT: 0
VOMITING: 0
BACK PAIN: 0
RHINORRHEA: 0
ABDOMINAL PAIN: 0

## 2019-12-23 ASSESSMENT — PAIN SCALES - GENERAL
PAINLEVEL_OUTOF10: 0
PAINLEVEL_OUTOF10: 4

## 2019-12-23 NOTE — H&P
40 mg by mouth daily   Yes Historical Provider, MD   docusate sodium (COLACE) 100 MG capsule Take 100 mg by mouth daily as needed for Constipation   Yes Historical Provider, MD   insulin glargine (LANTUS SOLOSTAR) 100 UNIT/ML injection Inject 65 Units into the skin nightly   Yes Historical Provider, MD   insulin lispro (HUMALOG) 100 UNIT/ML injection vial Inject 18 Units into the skin 3 times daily (before meals)   Yes Historical Provider, MD   nitroGLYCERIN (NITROSTAT) 0.4 MG SL tablet Place 0.4 mg under the tongue every 5 minutes as needed for Chest pain   Yes Historical Provider, MD   sertraline (ZOLOFT) 100 MG tablet Take 100 mg by mouth daily   Yes Historical Provider, MD   clopidogrel (PLAVIX) 75 MG tablet Take 75 mg by mouth daily   Yes Historical Provider, MD   lisinopril (PRINIVIL;ZESTRIL) 5 MG tablet Take 5 mg by mouth daily   Yes Historical Provider, MD   metoprolol (LOPRESSOR) 100 MG tablet Take 100 mg by mouth 2 times daily   Yes Historical Provider, MD   amLODIPine (NORVASC) 2.5 MG tablet Take 2.5 mg by mouth daily   Yes Historical Provider, MD   influenza virus trivalent vaccine (FLUZONE) injection Inject 0.5 mLs into the muscle once Given 11/2019   Yes Historical Provider, MD   acetaminophen (TYLENOL) 325 MG tablet Take 325 mg by mouth every 6 hours as needed for Pain   Yes Historical Provider, MD   isosorbide mononitrate (IMDUR) 30 MG extended release tablet Take 1 tablet by mouth daily 12/19/19  Yes Emerson Torres MD   bumetanide (BUMEX) 1 MG tablet Take 1 tablet by mouth daily 12/19/19  Yes Emerson Torres MD   folic acid (FOLVITE) 1 MG tablet Take 1 tablet by mouth daily 12/19/19  Yes Emerson Torres MD   ranolazine (RANEXA) 1000 MG extended release tablet Take 1 tablet by mouth 2 times daily 12/3/19  Yes Himanshu Alford MD   pantoprazole (PROTONIX) 40 MG tablet Take 40 mg by mouth every morning (before breakfast)   Yes Historical Provider, MD   gabapentin (NEURONTIN) 600 MG tablet Take 1 tablet by mouth person, place and time, well developed and well- nourished, in no acute distress  · Eyes: pupils equal, round, and reactive to light, extraocular eye movements intact, conjunctivae normal  · Neck: supple and non-tender without mass, no thyromegaly or thyroid nodules, no cervical lymphadenopathy  · Pulmonary/Chest: clear to auscultation bilaterally- no wheezes, rales or rhonchi, normal air movement, no respiratory distress  · Cardiovascular: normal rate, regular rhythm, normal S1 and S2, no murmurs, rubs, clicks, or gallops, distal pulses intact, no carotid bruits  · Abdomen: soft, non-tender, non-distended, normal bowel sounds, no masses or organomegaly  · Extremities: no cyanosis, clubbing or edema  · Musculoskeletal: normal range of motion, no joint swelling, deformity or tenderness  · Neurologic: reflexes normal and symmetric, no cranial nerve deficit, gait, coordination and speech normal   Labs and Imaging Studies   Basic Labs  Recent Labs     19  0750      K 4.8   CL 96*   CO2 30*   BUN 32*   CREATININE 1.4*   GLUCOSE 253*   CALCIUM 9.0       Recent Labs     19  0750   WBC 8.9   RBC 3.17*   HGB 9.7*   HCT 33.8*   .6*   MCH 30.6   MCHC 28.7*   RDW 15.6*      MPV 9.5           Imaging Studies:     Xr Abdomen (kub) (single Ap View)    Result Date: 2019  Patient MRN: [id number] : 1960 Age:  62 years Gender: Female Order Date: 2019 11:45 PM Exam: XR ABDOMEN (KUB) (SINGLE AP VIEW) Number of Images: 3 views Indication:   Abdominal distention Abdominal distention Comparison: Prior study from 10/30/2013 is available. Findings: The bowel gas pattern is normal. Attire abdomen has groundglass appearance suggesting of ascites. There is a right femoral line with the tip in the iliac vein. There appears to be a Coelho catheter in place. No evidence of organomegaly or abnormal calcifications seen.  The osseous structures of the abdomen and pelvis appear to be normal. Images: 1 view Indication:   sob sob Comparison: 2019 FINDINGS: Heart is enlarged. Pulmonary vascularity is less congested. Pulmonary edema which was suggested on the prior study has resolved. Mild congestive failure with cardiomegaly and pulmonary vascular congestion. Xr Chest Portable    Result Date: 2019  Patient MRN: [id number] : 1960 Age:  62 years Gender: Female Order Date: 2019 8:00 AM Exam: XR CHEST PORTABLE Number of Images: 1 view Indication:   SOB SOB Comparison: Prior study from 2019 is available Findings: Study demonstrate cardiomegaly which was seen before there is mild pleural venous congestion. There is uncoiling atherosclerotic change of thoracic aorta. The bony thorax demonstrate no gross abnormality. Cardiomegaly with mild pulmonary venous congestion and unchanged from prior study. Xr Chest Portable    Result Date: 2019  Patient MRN: [id number] : 1960 Age:  62 years Gender: Female Order Date: 2019 9:00 PM Exam: XR CHEST PORTABLE Number of Images: 1 view Indication:   AMS AMS Comparison: 2019 FINDINGS: Peribronchial cuffing is seen. There is a questionable right infrahilar consolidation. Small pulmonary opacity is also seen along the horizontal fissure. The cardiac silhouette is magnified. No pneumothorax or pleural effusion. Evaluation of the bones reveals no fracture or destructive lesion. Peribronchial cuffing, which may be due to bronchitis, either acute or chronic. Questionable right basilar consolidation. Radiographic follow-up recommended. Xr Chest Portable    Result Date: 2019  Patient MRN: [id number] : 1960 Age:  59 years Gender: Female Order Date: 2019 7:45 PM Exam: XR CHEST PORTABLE Number of Images: 1 view Indication:  Generalized weakness Comparison: Anterior upright chest 2017 and two-view upright chest 2017 Findings:  The lungs are symmetrically expanded, and show

## 2019-12-23 NOTE — ED PROVIDER NOTES
diabetes mellitus with other specified manifestations, not stated as uncontrolled, Ulcer of leg due to secondary diabetes ([facility] [address] Ulcer of other part of foot, and Unspecified diseases of blood and blood-forming organs. Past Surgical History:  has a past surgical history that includes Tonsillectomy; ECHO Compl W Dop Color Flow (2012);  section; Toe amputation; ECHO Compl W Dop Color Flow (2013); Cardiac catheterization (2012); Cardiac catheterization (2013); ECHO Compl W Dop Color Flow (10/20/2013); transesophageal echocardiogram (10/23/2013); Echo Complete (2013); Foot surgery (12/15/2013); Balloon angioplasty, artery (Right, 2013); Leg amputation below knee (Right, 14); Abdomen surgery; vascular surgery; Cardiac catheterization (2017); and above knee amputation (Left, 2017). Social History:  reports that she quit smoking about 17 years ago. Her smoking use included cigarettes. She has a 20.00 pack-year smoking history. She has never used smokeless tobacco. She reports that she does not drink alcohol or use drugs. Family History: family history includes Cancer in her brother; Diabetes in her father and mother; Heart Failure in her father; Hypertension in her father and mother. The patients home medications have been reviewed.     Allergies: Effexor [venlafaxine hcl]    -------------------------------------------------- RESULTS -------------------------------------------------    LABS:  Results for orders placed or performed during the hospital encounter of 19   Rapid influenza A/B antigens   Result Value Ref Range    Influenza A by PCR Not Detected Not Detected    Influenza B by PCR Not Detected Not Detected   CBC Auto Differential   Result Value Ref Range    WBC 8.9 4.5 - 11.5 E9/L    RBC 3.17 (L) 3.50 - 5.50 E12/L    Hemoglobin 9.7 (L) 11.5 - 15.5 g/dL    Hematocrit 33.8 (L) 34.0 - 48.0 %    .6 (H) 80.0 - 99.9 fL    MCH 30.6 26.0 - 35.0 pg    MCHC 28.7 (L) 32.0 - 34.5 %    RDW 15.6 (H) 11.5 - 15.0 fL    Platelets 260 104 - 008 E9/L    MPV 9.5 7.0 - 12.0 fL    Neutrophils % 79.6 43.0 - 80.0 %    Lymphocytes % 12.4 (L) 20.0 - 42.0 %    Monocytes % 5.3 2.0 - 12.0 %    Eosinophils % 2.7 0.0 - 6.0 %    Basophils % 0.7 0.0 - 2.0 %    Neutrophils Absolute 7.12 1.80 - 7.30 E9/L    Lymphocytes Absolute 1.07 (L) 1.50 - 4.00 E9/L    Monocytes Absolute 0.44 0.10 - 0.95 E9/L    Eosinophils Absolute 0.24 0.05 - 0.50 E9/L    Basophils Absolute 0.00 0.00 - 0.20 E9/L    Anisocytosis 1+     Polychromasia 1+     Hypochromia 1+     Poikilocytes 1+     Ovalocytes 1+     Stomatocytes 1+    Comprehensive Metabolic Panel   Result Value Ref Range    Sodium 140 132 - 146 mmol/L    Potassium 4.8 3.5 - 5.0 mmol/L    Chloride 96 (L) 98 - 107 mmol/L    CO2 30 (H) 22 - 29 mmol/L    Anion Gap 14 7 - 16 mmol/L    Glucose 253 (H) 74 - 99 mg/dL    BUN 32 (H) 8 - 23 mg/dL    CREATININE 1.4 (H) 0.5 - 1.0 mg/dL    GFR Non-African American 38 >=60 mL/min/1.73    GFR African American 46     Calcium 9.0 8.6 - 10.2 mg/dL    Total Protein 6.5 6.4 - 8.3 g/dL    Alb 3.5 3.5 - 5.2 g/dL    Total Bilirubin 0.6 0.0 - 1.2 mg/dL    Alkaline Phosphatase 90 35 - 104 U/L    ALT 37 (H) 0 - 32 U/L    AST 35 (H) 0 - 31 U/L   Troponin   Result Value Ref Range    Troponin 0.08 (H) 0.00 - 0.03 ng/mL   Lactic Acid, Plasma   Result Value Ref Range    Lactic Acid 2.3 (H) 0.5 - 2.2 mmol/L   Urinalysis   Result Value Ref Range    Color, UA Yellow Straw/Yellow    Clarity, UA Clear Clear    Glucose, Ur Negative Negative mg/dL    Bilirubin Urine SMALL (A) Negative    Ketones, Urine Negative Negative mg/dL    Specific Gravity, UA >=1.030 1.005 - 1.030    Blood, Urine Negative Negative    pH, UA 5.0 5.0 - 9.0    Protein,  (A) Negative mg/dL    Urobilinogen, Urine 1.0 <2.0 E.U./dL    Nitrite, Urine Negative Negative    Leukocyte Esterase, Urine TRACE (A) Negative   Brain Natriuretic Peptide   Result Value Ref Range    Pro-BNP 7,840 (H) 0 - 125 pg/mL   Microscopic Urinalysis   Result Value Ref Range    WBC, UA 1-3 0 - 5 /HPF    RBC, UA 0-1 0 - 2 /HPF    Bacteria, UA RARE (A) /HPF       RADIOLOGY:  CT Head WO Contrast   Final Result   No significant abnormal findings. XR CHEST PORTABLE   Final Result   Mild congestive failure with cardiomegaly and pulmonary vascular   congestion.                  ------------------------- NURSING NOTES AND VITALS REVIEWED ---------------------------  Date / Time Roomed:  12/23/2019  7:07 AM  ED Bed Assignment:  21/21    The nursing notes within the ED encounter and vital signs as below have been reviewed. Patient Vitals for the past 24 hrs:   BP Temp Temp src Pulse Resp SpO2   12/23/19 0927 114/66 -- -- 77 18 98 %   12/23/19 0900 110/78 97.5 °F (36.4 °C) Oral 77 18 100 %   12/23/19 0713 (!) 98/57 97.5 °F (36.4 °C) Oral 78 18 95 %       Oxygen Saturation Interpretation: Normal    ------------------------------------------ PROGRESS NOTES ------------------------------------------    Counseling:  I have spoken with the patient and discussed todays results, in addition to providing specific details for the plan of care and counseling regarding the diagnosis and prognosis. Their questions are answered at this time and they are agreeable with the plan of admission.    --------------------------------- ADDITIONAL PROVIDER NOTES ---------------------------------  Consultations:  Spoke with Dr. Mckay Muniz. Discussed case. They will admit the patient. This patient's ED course included: a personal history and physicial examination, re-evaluation prior to disposition, multiple bedside re-evaluations, IV medications, cardiac monitoring, continuous pulse oximetry and complex medical decision making and emergency management     Discussed imaging and laboratory results with patient. Patient was hypoxic in the emergency department down to 86% on room air.   She only needed 1 L nasal cannula to improve. Patient's BNP is elevated to greater than 7000. She appeared to be very dehydrated to her and her labs being concentrated. Patient improved with fluids. Contact admitting physician for further management. Concern is the hypoxia. Admitting physician agreed with further management. Patient agreed with plan. This patient has remained hemodynamically stable during their ED course. Diagnosis:  1. Hypoxia    2. Congestive heart failure, unspecified HF chronicity, unspecified heart failure type (HCC)    3. Dehydration    4. Generalized weakness        Disposition:  Patient's disposition: Admit to telemetry  Patient's condition is stable.            Jennifer Arrieta DO  Resident  12/23/19 8421

## 2019-12-24 LAB
ANION GAP SERPL CALCULATED.3IONS-SCNC: 15 MMOL/L (ref 7–16)
BUN BLDV-MCNC: 31 MG/DL (ref 8–23)
CALCIUM SERPL-MCNC: 9 MG/DL (ref 8.6–10.2)
CHLORIDE BLD-SCNC: 98 MMOL/L (ref 98–107)
CO2: 26 MMOL/L (ref 22–29)
CREAT SERPL-MCNC: 1.3 MG/DL (ref 0.5–1)
GFR AFRICAN AMERICAN: 50
GFR NON-AFRICAN AMERICAN: 42 ML/MIN/1.73
GLUCOSE BLD-MCNC: 259 MG/DL (ref 74–99)
HCT VFR BLD CALC: 33.5 % (ref 34–48)
HEMOGLOBIN: 9.7 G/DL (ref 11.5–15.5)
MCH RBC QN AUTO: 30.3 PG (ref 26–35)
MCHC RBC AUTO-ENTMCNC: 29 % (ref 32–34.5)
MCV RBC AUTO: 104.7 FL (ref 80–99.9)
METER GLUCOSE: 194 MG/DL (ref 74–99)
METER GLUCOSE: 201 MG/DL (ref 74–99)
METER GLUCOSE: 256 MG/DL (ref 74–99)
METER GLUCOSE: 278 MG/DL (ref 74–99)
PDW BLD-RTO: 15.5 FL (ref 11.5–15)
PLATELET # BLD: 314 E9/L (ref 130–450)
PMV BLD AUTO: 9.6 FL (ref 7–12)
POTASSIUM REFLEX MAGNESIUM: 4.1 MMOL/L (ref 3.5–5)
RBC # BLD: 3.2 E12/L (ref 3.5–5.5)
SODIUM BLD-SCNC: 139 MMOL/L (ref 132–146)
WBC # BLD: 7.4 E9/L (ref 4.5–11.5)

## 2019-12-24 PROCEDURE — 85027 COMPLETE CBC AUTOMATED: CPT

## 2019-12-24 PROCEDURE — 36415 COLL VENOUS BLD VENIPUNCTURE: CPT

## 2019-12-24 PROCEDURE — 80048 BASIC METABOLIC PNL TOTAL CA: CPT

## 2019-12-24 PROCEDURE — 6360000002 HC RX W HCPCS: Performed by: INTERNAL MEDICINE

## 2019-12-24 PROCEDURE — 6370000000 HC RX 637 (ALT 250 FOR IP): Performed by: INTERNAL MEDICINE

## 2019-12-24 PROCEDURE — 2580000003 HC RX 258: Performed by: INTERNAL MEDICINE

## 2019-12-24 PROCEDURE — 82962 GLUCOSE BLOOD TEST: CPT

## 2019-12-24 PROCEDURE — 2140000000 HC CCU INTERMEDIATE R&B

## 2019-12-24 PROCEDURE — 2700000000 HC OXYGEN THERAPY PER DAY

## 2019-12-24 PROCEDURE — 99231 SBSQ HOSP IP/OBS SF/LOW 25: CPT | Performed by: INTERNAL MEDICINE

## 2019-12-24 RX ORDER — OXYCODONE HYDROCHLORIDE AND ACETAMINOPHEN 5; 325 MG/1; MG/1
1 TABLET ORAL EVERY 6 HOURS PRN
Status: DISCONTINUED | OUTPATIENT
Start: 2019-12-24 | End: 2020-01-02 | Stop reason: HOSPADM

## 2019-12-24 RX ORDER — SODIUM CHLORIDE 9 MG/ML
INJECTION, SOLUTION INTRAVENOUS CONTINUOUS
Status: ACTIVE | OUTPATIENT
Start: 2019-12-24 | End: 2019-12-24

## 2019-12-24 RX ORDER — BUMETANIDE 1 MG/1
1 TABLET ORAL DAILY
Status: DISCONTINUED | OUTPATIENT
Start: 2019-12-24 | End: 2019-12-30

## 2019-12-24 RX ADMIN — SERTRALINE 100 MG: 100 TABLET, FILM COATED ORAL at 10:13

## 2019-12-24 RX ADMIN — OXYCODONE AND ACETAMINOPHEN 1 TABLET: 5; 325 TABLET ORAL at 21:28

## 2019-12-24 RX ADMIN — FOLIC ACID 1 MG: 1 TABLET ORAL at 10:13

## 2019-12-24 RX ADMIN — ENOXAPARIN SODIUM 40 MG: 40 INJECTION SUBCUTANEOUS at 10:12

## 2019-12-24 RX ADMIN — INSULIN LISPRO 1 UNITS: 100 INJECTION, SOLUTION INTRAVENOUS; SUBCUTANEOUS at 17:34

## 2019-12-24 RX ADMIN — GABAPENTIN 600 MG: 300 CAPSULE ORAL at 14:46

## 2019-12-24 RX ADMIN — INSULIN GLARGINE 65 UNITS: 100 INJECTION, SOLUTION SUBCUTANEOUS at 21:41

## 2019-12-24 RX ADMIN — INSULIN LISPRO 3 UNITS: 100 INJECTION, SOLUTION INTRAVENOUS; SUBCUTANEOUS at 11:45

## 2019-12-24 RX ADMIN — RANOLAZINE 1000 MG: 500 TABLET, FILM COATED, EXTENDED RELEASE ORAL at 10:13

## 2019-12-24 RX ADMIN — SODIUM CHLORIDE: 9 INJECTION, SOLUTION INTRAVENOUS at 11:48

## 2019-12-24 RX ADMIN — CLOPIDOGREL 75 MG: 75 TABLET, FILM COATED ORAL at 10:13

## 2019-12-24 RX ADMIN — RANOLAZINE 1000 MG: 500 TABLET, FILM COATED, EXTENDED RELEASE ORAL at 21:28

## 2019-12-24 RX ADMIN — BUMETANIDE 1 MG: 1 TABLET ORAL at 11:36

## 2019-12-24 RX ADMIN — GABAPENTIN 600 MG: 300 CAPSULE ORAL at 10:13

## 2019-12-24 RX ADMIN — PANTOPRAZOLE SODIUM 40 MG: 40 TABLET, DELAYED RELEASE ORAL at 06:07

## 2019-12-24 RX ADMIN — INSULIN LISPRO 18 UNITS: 100 INJECTION, SOLUTION INTRAVENOUS; SUBCUTANEOUS at 17:33

## 2019-12-24 RX ADMIN — INSULIN LISPRO 18 UNITS: 100 INJECTION, SOLUTION INTRAVENOUS; SUBCUTANEOUS at 07:12

## 2019-12-24 RX ADMIN — OXYCODONE AND ACETAMINOPHEN 1 TABLET: 5; 325 TABLET ORAL at 11:36

## 2019-12-24 RX ADMIN — ASPIRIN 81 MG: 81 TABLET, COATED ORAL at 10:13

## 2019-12-24 RX ADMIN — SODIUM CHLORIDE, PRESERVATIVE FREE 10 ML: 5 INJECTION INTRAVENOUS at 10:12

## 2019-12-24 RX ADMIN — GABAPENTIN 600 MG: 300 CAPSULE ORAL at 21:28

## 2019-12-24 RX ADMIN — ISOSORBIDE MONONITRATE 30 MG: 30 TABLET ORAL at 10:13

## 2019-12-24 RX ADMIN — INSULIN LISPRO 18 UNITS: 100 INJECTION, SOLUTION INTRAVENOUS; SUBCUTANEOUS at 11:37

## 2019-12-24 RX ADMIN — ATORVASTATIN CALCIUM 40 MG: 40 TABLET, FILM COATED ORAL at 10:13

## 2019-12-24 RX ADMIN — INSULIN LISPRO 1 UNITS: 100 INJECTION, SOLUTION INTRAVENOUS; SUBCUTANEOUS at 21:41

## 2019-12-24 ASSESSMENT — PAIN DESCRIPTION - ORIENTATION: ORIENTATION: RIGHT;LEFT

## 2019-12-24 ASSESSMENT — PAIN SCALES - GENERAL
PAINLEVEL_OUTOF10: 0
PAINLEVEL_OUTOF10: 0
PAINLEVEL_OUTOF10: 6
PAINLEVEL_OUTOF10: 0
PAINLEVEL_OUTOF10: 8

## 2019-12-24 ASSESSMENT — PAIN DESCRIPTION - PAIN TYPE
TYPE: CHRONIC PAIN
TYPE: CHRONIC PAIN

## 2019-12-24 ASSESSMENT — PAIN DESCRIPTION - PROGRESSION: CLINICAL_PROGRESSION: GRADUALLY WORSENING

## 2019-12-24 ASSESSMENT — PAIN DESCRIPTION - DESCRIPTORS: DESCRIPTORS: ACHING;NUMBNESS;TINGLING

## 2019-12-24 ASSESSMENT — PAIN DESCRIPTION - FREQUENCY: FREQUENCY: INTERMITTENT

## 2019-12-24 ASSESSMENT — PAIN DESCRIPTION - LOCATION: LOCATION: LEG

## 2019-12-24 NOTE — PROGRESS NOTES
[facility]  Internal Medicine Residency Program  Progress Note - House Team 1    Patient:  Kenzie West 62 y.o. female MRN: [id number]     Date of Service: 12/24/2019     CC: sob and fatigue    Subjective     Patient examined at bedside, feels better, but in lot of pain, restarted her norco home dose, she denied any chest pain, dizziness , nausea or vomiting but still complains of tingling      Objective     Physical Exam:  · Vitals: BP (!) 116/57   Pulse 84   Temp 97.4 °F (36.3 °C) (Temporal)   Resp 18   Wt 215 lb 8 oz (97.8 kg)   SpO2 96%   BMI 42.09 kg/m²     · I & O - 24hr: No intake/output data recorded. · General Appearance: alert, appears stated age and cooperative  · HEENT:  Head: Normal, normocephalic, atraumatic. · Neck: supple, symmetrical, trachea midline  · Lung: clear to auscultation bilaterally  · Heart: regular rate and rhythm, S1, S2 normal, no murmur, click, rub or gallop  · Abdomen: soft, non-tender; bowel sounds normal; no masses,  no organomegaly  · Extremities:  R BKA, L AKA  · Musculokeletal: R BKA and L aka.    · Neurologic: Mental status: Alert, oriented, thought content appropriate  Subject  Pertinent Labs & Imaging Studies   heather  CBC with Differential:    Lab Results   Component Value Date    WBC 7.4 12/24/2019    RBC 3.20 12/24/2019    HGB 9.7 12/24/2019    HCT 33.5 12/24/2019     12/24/2019    .7 12/24/2019    MCH 30.3 12/24/2019    MCHC 29.0 12/24/2019    RDW 15.5 12/24/2019    NRBC 1 09/21/2012    SEGSPCT 50 03/21/2014    LYMPHOPCT 12.4 12/23/2019    MONOPCT 5.3 12/23/2019    BASOPCT 0.7 12/23/2019    MONOSABS 0.44 12/23/2019    LYMPHSABS 1.07 12/23/2019    EOSABS 0.24 12/23/2019    BASOSABS 0.00 12/23/2019     BMP:    Lab Results   Component Value Date     12/24/2019    K 4.1 12/24/2019    CL 98 12/24/2019    CO2 26 12/24/2019    BUN 31 12/24/2019    LABALBU 3.5 12/23/2019    LABALBU 4.3 04/16/2012    CREATININE 1.3 12/24/2019

## 2019-12-25 LAB
ANION GAP SERPL CALCULATED.3IONS-SCNC: 15 MMOL/L (ref 7–16)
BUN BLDV-MCNC: 32 MG/DL (ref 8–23)
CALCIUM SERPL-MCNC: 9.1 MG/DL (ref 8.6–10.2)
CHLORIDE BLD-SCNC: 99 MMOL/L (ref 98–107)
CO2: 26 MMOL/L (ref 22–29)
CREAT SERPL-MCNC: 1.2 MG/DL (ref 0.5–1)
CREATININE URINE: 75 MG/DL (ref 29–226)
GFR AFRICAN AMERICAN: 55
GFR NON-AFRICAN AMERICAN: 46 ML/MIN/1.73
GLUCOSE BLD-MCNC: 186 MG/DL (ref 74–99)
METER GLUCOSE: 133 MG/DL (ref 74–99)
METER GLUCOSE: 148 MG/DL (ref 74–99)
METER GLUCOSE: 195 MG/DL (ref 74–99)
METER GLUCOSE: 213 MG/DL (ref 74–99)
METER GLUCOSE: 57 MG/DL (ref 74–99)
METER GLUCOSE: 67 MG/DL (ref 74–99)
MICROALBUMIN UR-MCNC: 31.2 MG/L
MICROALBUMIN/CREAT UR-RTO: 41.6 (ref 0–30)
POTASSIUM REFLEX MAGNESIUM: 4.3 MMOL/L (ref 3.5–5)
PROTEIN PROTEIN: 10 MG/DL (ref 0–12)
PROTEIN/CREAT RATIO: 0.1
PROTEIN/CREAT RATIO: 0.1 (ref 0–0.2)
SODIUM BLD-SCNC: 140 MMOL/L (ref 132–146)
URINE CULTURE, ROUTINE: NORMAL

## 2019-12-25 PROCEDURE — 6370000000 HC RX 637 (ALT 250 FOR IP): Performed by: INTERNAL MEDICINE

## 2019-12-25 PROCEDURE — 82570 ASSAY OF URINE CREATININE: CPT

## 2019-12-25 PROCEDURE — 6360000002 HC RX W HCPCS: Performed by: INTERNAL MEDICINE

## 2019-12-25 PROCEDURE — 2580000003 HC RX 258: Performed by: INTERNAL MEDICINE

## 2019-12-25 PROCEDURE — 84156 ASSAY OF PROTEIN URINE: CPT

## 2019-12-25 PROCEDURE — 2700000000 HC OXYGEN THERAPY PER DAY

## 2019-12-25 PROCEDURE — 82044 UR ALBUMIN SEMIQUANTITATIVE: CPT

## 2019-12-25 PROCEDURE — 80048 BASIC METABOLIC PNL TOTAL CA: CPT

## 2019-12-25 PROCEDURE — 2140000000 HC CCU INTERMEDIATE R&B

## 2019-12-25 PROCEDURE — 99231 SBSQ HOSP IP/OBS SF/LOW 25: CPT | Performed by: INTERNAL MEDICINE

## 2019-12-25 PROCEDURE — 82962 GLUCOSE BLOOD TEST: CPT

## 2019-12-25 PROCEDURE — 36415 COLL VENOUS BLD VENIPUNCTURE: CPT

## 2019-12-25 RX ORDER — SODIUM CHLORIDE 9 MG/ML
INJECTION, SOLUTION INTRAVENOUS CONTINUOUS
Status: ACTIVE | OUTPATIENT
Start: 2019-12-25 | End: 2019-12-25

## 2019-12-25 RX ADMIN — MAGNESIUM HYDROXIDE 30 ML: 400 SUSPENSION ORAL at 20:41

## 2019-12-25 RX ADMIN — INSULIN LISPRO 18 UNITS: 100 INJECTION, SOLUTION INTRAVENOUS; SUBCUTANEOUS at 12:20

## 2019-12-25 RX ADMIN — DOCUSATE SODIUM 100 MG: 100 CAPSULE, LIQUID FILLED ORAL at 20:42

## 2019-12-25 RX ADMIN — GABAPENTIN 600 MG: 300 CAPSULE ORAL at 20:41

## 2019-12-25 RX ADMIN — SODIUM CHLORIDE, PRESERVATIVE FREE 10 ML: 5 INJECTION INTRAVENOUS at 08:51

## 2019-12-25 RX ADMIN — ATORVASTATIN CALCIUM 40 MG: 40 TABLET, FILM COATED ORAL at 08:50

## 2019-12-25 RX ADMIN — PANTOPRAZOLE SODIUM 40 MG: 40 TABLET, DELAYED RELEASE ORAL at 06:18

## 2019-12-25 RX ADMIN — RANOLAZINE 1000 MG: 500 TABLET, FILM COATED, EXTENDED RELEASE ORAL at 20:41

## 2019-12-25 RX ADMIN — FOLIC ACID 1 MG: 1 TABLET ORAL at 08:50

## 2019-12-25 RX ADMIN — OXYCODONE AND ACETAMINOPHEN 1 TABLET: 5; 325 TABLET ORAL at 23:00

## 2019-12-25 RX ADMIN — ENOXAPARIN SODIUM 40 MG: 40 INJECTION SUBCUTANEOUS at 08:51

## 2019-12-25 RX ADMIN — GABAPENTIN 600 MG: 300 CAPSULE ORAL at 08:50

## 2019-12-25 RX ADMIN — INSULIN GLARGINE 65 UNITS: 100 INJECTION, SOLUTION SUBCUTANEOUS at 20:43

## 2019-12-25 RX ADMIN — INSULIN LISPRO 1 UNITS: 100 INJECTION, SOLUTION INTRAVENOUS; SUBCUTANEOUS at 17:39

## 2019-12-25 RX ADMIN — BUMETANIDE 1 MG: 1 TABLET ORAL at 08:50

## 2019-12-25 RX ADMIN — ISOSORBIDE MONONITRATE 30 MG: 30 TABLET ORAL at 08:51

## 2019-12-25 RX ADMIN — SERTRALINE 100 MG: 100 TABLET, FILM COATED ORAL at 08:51

## 2019-12-25 RX ADMIN — GABAPENTIN 600 MG: 300 CAPSULE ORAL at 14:24

## 2019-12-25 RX ADMIN — CLOPIDOGREL 75 MG: 75 TABLET, FILM COATED ORAL at 08:51

## 2019-12-25 RX ADMIN — SODIUM CHLORIDE: 9 INJECTION, SOLUTION INTRAVENOUS at 11:50

## 2019-12-25 RX ADMIN — INSULIN LISPRO 18 UNITS: 100 INJECTION, SOLUTION INTRAVENOUS; SUBCUTANEOUS at 06:31

## 2019-12-25 RX ADMIN — INSULIN LISPRO 18 UNITS: 100 INJECTION, SOLUTION INTRAVENOUS; SUBCUTANEOUS at 17:35

## 2019-12-25 RX ADMIN — ASPIRIN 81 MG: 81 TABLET, COATED ORAL at 08:51

## 2019-12-25 RX ADMIN — INSULIN LISPRO 1 UNITS: 100 INJECTION, SOLUTION INTRAVENOUS; SUBCUTANEOUS at 06:26

## 2019-12-25 RX ADMIN — SODIUM CHLORIDE, PRESERVATIVE FREE 10 ML: 5 INJECTION INTRAVENOUS at 20:44

## 2019-12-25 RX ADMIN — RANOLAZINE 1000 MG: 500 TABLET, FILM COATED, EXTENDED RELEASE ORAL at 08:50

## 2019-12-25 ASSESSMENT — PAIN SCALES - GENERAL
PAINLEVEL_OUTOF10: 7
PAINLEVEL_OUTOF10: 0

## 2019-12-25 NOTE — PROGRESS NOTES
Attending Physician Statement  I have discussed the case, including pertinent history and exam findings with the resident. I have seen and examined the patient and the key elements of the encounter have been performed by me. I agree with the assessment, plan and orders as documented by the resident. Pt developed decreased US, IVF restarted with diuretics, 500 cc of UO. Patient reported SOB in exertion, and recommended renal scan. Continue monitor BMP and fluid status including daily weight.   Julio César Joseph

## 2019-12-25 NOTE — PROGRESS NOTES
GFRAA 50 12/24/2019    LABGLOM 42 12/24/2019    GLUCOSE 259 12/24/2019    GLUCOSE 264 04/17/2012       Resident's Assessment and Plan     The patient is a 62 y.o. female with PMH of HTN,CKD, PVD, s/p BKA, L AKA, DM II, EF of 38% from echo 12/2019, CAD with failed PCI in 2017, presented to ED with sob today to ED, she was recently admitted for acute exacerbation of HF, with LA and was discharged with lasix on 12/17/2019. She had a LHC at [facility] on 12/11/2019 showed EF of 38%, with 30% stenosis in the dLMT, 60% in the ostial LAD, 99% narrowing of LAD, dLCx 70% narrowung and 80% marginal CX. She also complained of some whooshing sound in her head in ED associated with dizziness with no LOC, tingling in both ears, with nausea. she denied fever, chills, sick contacts, no vomiting, chest pain, no diarrhea     Neurology  AAOx3  CT head unremarkable     Cardiology  Acute hypoxic resp failure  2/2 decompensated HFrEF  On one L oxygen  Lopressor, lisinorpil and bumex,   proBNP 7840, baseline < 1200  Trop 0.08 elevated on baseline  Will hold . lisinopril and lopressor for now  On bumex   Urine output 550  Daily weights     CAD s/p failed PCI  LHC at [facility] in 12/2019  On ASA, statin, plavix  On ranolazine,Imdur  Cont ranolazine and imdur     Hypotension  2/2 possible due to lasix  Better with 500 ml bolus  Better now  Monitor vitals and I&O  Measure orthostats     MATTHEW  2/2 prerenal from diuresis  Baseline creatinine 1  Creatinine pending today  Monitor urine output last 24 hrs 550 ml  Hold BP meds and lisinopril     Vertigo  Possible 2/2 meds vs diabetic neuropathy     Type II DM  On lantus 65, 18 units premeal  a1c in dec 2019 9.9  On neurontin  On  LDSS  BS as per AC and HS     HLD  On lipitor and fenofibrate     HTN  On norvasc, lopressor, lisinopril  Hold for now for hypotension and MATTHEW     MDD  On sertraline 100     GERD  PPI daily     Adrenal insuff  On solucortef  last admission  tapered        PT/OT evaluation:  DVT prophylaxis/ GI prophylaxis: lovenox/PPI  Disposition: telemetry  Aldair Dumont M.D.   Internal Medicine Resident - PGY 1     Attending physician: Dr. Marlene Mason

## 2019-12-26 LAB
ANION GAP SERPL CALCULATED.3IONS-SCNC: 14 MMOL/L (ref 7–16)
BUN BLDV-MCNC: 25 MG/DL (ref 8–23)
CALCIUM SERPL-MCNC: 9.1 MG/DL (ref 8.6–10.2)
CHLORIDE BLD-SCNC: 102 MMOL/L (ref 98–107)
CO2: 27 MMOL/L (ref 22–29)
CREAT SERPL-MCNC: 1.3 MG/DL (ref 0.5–1)
EKG ATRIAL RATE: 77 BPM
EKG P AXIS: 64 DEGREES
EKG P-R INTERVAL: 184 MS
EKG Q-T INTERVAL: 426 MS
EKG QRS DURATION: 122 MS
EKG QTC CALCULATION (BAZETT): 482 MS
EKG R AXIS: 76 DEGREES
EKG T AXIS: 180 DEGREES
EKG VENTRICULAR RATE: 77 BPM
GFR AFRICAN AMERICAN: 50
GFR NON-AFRICAN AMERICAN: 42 ML/MIN/1.73
GLUCOSE BLD-MCNC: 132 MG/DL (ref 74–99)
METER GLUCOSE: 109 MG/DL (ref 74–99)
METER GLUCOSE: 112 MG/DL (ref 74–99)
METER GLUCOSE: 128 MG/DL (ref 74–99)
METER GLUCOSE: 165 MG/DL (ref 74–99)
POTASSIUM REFLEX MAGNESIUM: 4.8 MMOL/L (ref 3.5–5)
SODIUM BLD-SCNC: 143 MMOL/L (ref 132–146)

## 2019-12-26 PROCEDURE — 82962 GLUCOSE BLOOD TEST: CPT

## 2019-12-26 PROCEDURE — 2700000000 HC OXYGEN THERAPY PER DAY

## 2019-12-26 PROCEDURE — 80048 BASIC METABOLIC PNL TOTAL CA: CPT

## 2019-12-26 PROCEDURE — 6360000002 HC RX W HCPCS: Performed by: INTERNAL MEDICINE

## 2019-12-26 PROCEDURE — 99231 SBSQ HOSP IP/OBS SF/LOW 25: CPT | Performed by: INTERNAL MEDICINE

## 2019-12-26 PROCEDURE — 2580000003 HC RX 258: Performed by: INTERNAL MEDICINE

## 2019-12-26 PROCEDURE — 6370000000 HC RX 637 (ALT 250 FOR IP): Performed by: INTERNAL MEDICINE

## 2019-12-26 PROCEDURE — 2140000000 HC CCU INTERMEDIATE R&B

## 2019-12-26 PROCEDURE — 36415 COLL VENOUS BLD VENIPUNCTURE: CPT

## 2019-12-26 PROCEDURE — 93010 ELECTROCARDIOGRAM REPORT: CPT | Performed by: INTERNAL MEDICINE

## 2019-12-26 RX ORDER — LISINOPRIL 5 MG/1
5 TABLET ORAL DAILY
Status: DISCONTINUED | OUTPATIENT
Start: 2019-12-26 | End: 2020-01-02

## 2019-12-26 RX ADMIN — BUMETANIDE 1 MG: 1 TABLET ORAL at 09:21

## 2019-12-26 RX ADMIN — ENOXAPARIN SODIUM 40 MG: 40 INJECTION SUBCUTANEOUS at 09:20

## 2019-12-26 RX ADMIN — SERTRALINE 100 MG: 100 TABLET, FILM COATED ORAL at 09:21

## 2019-12-26 RX ADMIN — GABAPENTIN 600 MG: 300 CAPSULE ORAL at 13:08

## 2019-12-26 RX ADMIN — SODIUM CHLORIDE, PRESERVATIVE FREE 10 ML: 5 INJECTION INTRAVENOUS at 22:50

## 2019-12-26 RX ADMIN — INSULIN LISPRO 1 UNITS: 100 INJECTION, SOLUTION INTRAVENOUS; SUBCUTANEOUS at 11:33

## 2019-12-26 RX ADMIN — OXYCODONE AND ACETAMINOPHEN 1 TABLET: 5; 325 TABLET ORAL at 13:08

## 2019-12-26 RX ADMIN — GABAPENTIN 600 MG: 300 CAPSULE ORAL at 09:21

## 2019-12-26 RX ADMIN — ASPIRIN 81 MG: 81 TABLET, COATED ORAL at 09:21

## 2019-12-26 RX ADMIN — MAGNESIUM HYDROXIDE 30 ML: 400 SUSPENSION ORAL at 22:42

## 2019-12-26 RX ADMIN — RANOLAZINE 1000 MG: 500 TABLET, FILM COATED, EXTENDED RELEASE ORAL at 09:21

## 2019-12-26 RX ADMIN — INSULIN GLARGINE 65 UNITS: 100 INJECTION, SOLUTION SUBCUTANEOUS at 22:46

## 2019-12-26 RX ADMIN — ISOSORBIDE MONONITRATE 30 MG: 30 TABLET ORAL at 09:21

## 2019-12-26 RX ADMIN — GABAPENTIN 600 MG: 300 CAPSULE ORAL at 22:43

## 2019-12-26 RX ADMIN — FOLIC ACID 1 MG: 1 TABLET ORAL at 09:21

## 2019-12-26 RX ADMIN — RANOLAZINE 1000 MG: 500 TABLET, FILM COATED, EXTENDED RELEASE ORAL at 22:43

## 2019-12-26 RX ADMIN — PANTOPRAZOLE SODIUM 40 MG: 40 TABLET, DELAYED RELEASE ORAL at 06:46

## 2019-12-26 RX ADMIN — ATORVASTATIN CALCIUM 40 MG: 40 TABLET, FILM COATED ORAL at 09:21

## 2019-12-26 RX ADMIN — INSULIN LISPRO 18 UNITS: 100 INJECTION, SOLUTION INTRAVENOUS; SUBCUTANEOUS at 11:28

## 2019-12-26 RX ADMIN — LISINOPRIL 5 MG: 5 TABLET ORAL at 11:28

## 2019-12-26 RX ADMIN — INSULIN LISPRO 18 UNITS: 100 INJECTION, SOLUTION INTRAVENOUS; SUBCUTANEOUS at 17:27

## 2019-12-26 RX ADMIN — CLOPIDOGREL 75 MG: 75 TABLET, FILM COATED ORAL at 09:20

## 2019-12-26 ASSESSMENT — PAIN SCALES - GENERAL
PAINLEVEL_OUTOF10: 7
PAINLEVEL_OUTOF10: 0
PAINLEVEL_OUTOF10: 0

## 2019-12-26 NOTE — PROGRESS NOTES
norvasc, lopressor, lisinopril  Resume lisinopril   Hold metoprolol and norvasc at discharge!  Ask her to check BP daily and bring log to next visit      MDD  On sertraline 100     GERD  PPI daily     Adrenal insuff  On solucortef  last admission  tapered        PT/OT evaluation:  DVT prophylaxis/ GI prophylaxis: lovenox/PPI  Disposition: telemetry    Preston Morse MD, PGY-2  Internal Medicine Resident     Attending physician: Dr. Dane Guerra

## 2019-12-26 NOTE — PROGRESS NOTES
[facility] [address] [facility]  Internal Medicine Residency / [address]    Attending Physician Statement  I have discussed the case, including pertinent history and exam findings with the resident and the team.  I have seen and examined the patient and the key elements of the encounter have been performed by me. I agree with the assessment, plan and orders as documented by the resident. A&O  BMP improved   VS stable and BP controlled on less meds  Still having some KUMAR  Trace sacral edema   Impression; CHF systolic                       Hypotension and low Volume on admission  Plan; Discharge planning              Review meds    Remainder of medical problems as per resident note.       Messi Hanover  Internal Medicine Residency Faculty

## 2019-12-27 LAB
ANION GAP SERPL CALCULATED.3IONS-SCNC: 16 MMOL/L (ref 7–16)
BUN BLDV-MCNC: 22 MG/DL (ref 8–23)
CALCIUM SERPL-MCNC: 8.8 MG/DL (ref 8.6–10.2)
CHLORIDE BLD-SCNC: 98 MMOL/L (ref 98–107)
CO2: 27 MMOL/L (ref 22–29)
CREAT SERPL-MCNC: 1.2 MG/DL (ref 0.5–1)
GFR AFRICAN AMERICAN: 55
GFR NON-AFRICAN AMERICAN: 46 ML/MIN/1.73
GLUCOSE BLD-MCNC: 113 MG/DL (ref 74–99)
METER GLUCOSE: 116 MG/DL (ref 74–99)
METER GLUCOSE: 162 MG/DL (ref 74–99)
METER GLUCOSE: 191 MG/DL (ref 74–99)
METER GLUCOSE: 86 MG/DL (ref 74–99)
POTASSIUM REFLEX MAGNESIUM: 4 MMOL/L (ref 3.5–5)
PRO-BNP: 3795 PG/ML (ref 0–125)
SODIUM BLD-SCNC: 141 MMOL/L (ref 132–146)

## 2019-12-27 PROCEDURE — 2140000000 HC CCU INTERMEDIATE R&B

## 2019-12-27 PROCEDURE — 83880 ASSAY OF NATRIURETIC PEPTIDE: CPT

## 2019-12-27 PROCEDURE — 6360000002 HC RX W HCPCS: Performed by: INTERNAL MEDICINE

## 2019-12-27 PROCEDURE — 2700000000 HC OXYGEN THERAPY PER DAY

## 2019-12-27 PROCEDURE — 97165 OT EVAL LOW COMPLEX 30 MIN: CPT

## 2019-12-27 PROCEDURE — 6370000000 HC RX 637 (ALT 250 FOR IP): Performed by: INTERNAL MEDICINE

## 2019-12-27 PROCEDURE — 36415 COLL VENOUS BLD VENIPUNCTURE: CPT

## 2019-12-27 PROCEDURE — 99231 SBSQ HOSP IP/OBS SF/LOW 25: CPT | Performed by: INTERNAL MEDICINE

## 2019-12-27 PROCEDURE — 97161 PT EVAL LOW COMPLEX 20 MIN: CPT

## 2019-12-27 PROCEDURE — 97530 THERAPEUTIC ACTIVITIES: CPT

## 2019-12-27 PROCEDURE — 82962 GLUCOSE BLOOD TEST: CPT

## 2019-12-27 PROCEDURE — 80048 BASIC METABOLIC PNL TOTAL CA: CPT

## 2019-12-27 PROCEDURE — 2580000003 HC RX 258: Performed by: INTERNAL MEDICINE

## 2019-12-27 RX ORDER — SENNA PLUS 8.6 MG/1
1 TABLET ORAL 2 TIMES DAILY
Status: DISCONTINUED | OUTPATIENT
Start: 2019-12-27 | End: 2020-01-02 | Stop reason: HOSPADM

## 2019-12-27 RX ADMIN — BUMETANIDE 1 MG: 1 TABLET ORAL at 10:43

## 2019-12-27 RX ADMIN — GABAPENTIN 600 MG: 300 CAPSULE ORAL at 22:01

## 2019-12-27 RX ADMIN — INSULIN LISPRO 18 UNITS: 100 INJECTION, SOLUTION INTRAVENOUS; SUBCUTANEOUS at 07:26

## 2019-12-27 RX ADMIN — FOLIC ACID 1 MG: 1 TABLET ORAL at 10:23

## 2019-12-27 RX ADMIN — CLOPIDOGREL 75 MG: 75 TABLET, FILM COATED ORAL at 10:24

## 2019-12-27 RX ADMIN — ENOXAPARIN SODIUM 40 MG: 40 INJECTION SUBCUTANEOUS at 10:25

## 2019-12-27 RX ADMIN — GABAPENTIN 600 MG: 300 CAPSULE ORAL at 17:35

## 2019-12-27 RX ADMIN — DOCUSATE SODIUM 100 MG: 100 CAPSULE, LIQUID FILLED ORAL at 10:23

## 2019-12-27 RX ADMIN — INSULIN GLARGINE 65 UNITS: 100 INJECTION, SOLUTION SUBCUTANEOUS at 22:14

## 2019-12-27 RX ADMIN — ASPIRIN 81 MG: 81 TABLET, COATED ORAL at 10:24

## 2019-12-27 RX ADMIN — ISOSORBIDE MONONITRATE 30 MG: 30 TABLET ORAL at 10:24

## 2019-12-27 RX ADMIN — SENNOSIDES 8.6 MG: 8.6 TABLET, FILM COATED ORAL at 10:23

## 2019-12-27 RX ADMIN — LISINOPRIL 5 MG: 5 TABLET ORAL at 10:24

## 2019-12-27 RX ADMIN — INSULIN LISPRO 18 UNITS: 100 INJECTION, SOLUTION INTRAVENOUS; SUBCUTANEOUS at 12:58

## 2019-12-27 RX ADMIN — PANTOPRAZOLE SODIUM 40 MG: 40 TABLET, DELAYED RELEASE ORAL at 06:11

## 2019-12-27 RX ADMIN — OXYCODONE AND ACETAMINOPHEN 1 TABLET: 5; 325 TABLET ORAL at 10:43

## 2019-12-27 RX ADMIN — SERTRALINE 100 MG: 100 TABLET, FILM COATED ORAL at 10:43

## 2019-12-27 RX ADMIN — RANOLAZINE 1000 MG: 500 TABLET, FILM COATED, EXTENDED RELEASE ORAL at 22:01

## 2019-12-27 RX ADMIN — OXYCODONE AND ACETAMINOPHEN 1 TABLET: 5; 325 TABLET ORAL at 22:00

## 2019-12-27 RX ADMIN — ATORVASTATIN CALCIUM 40 MG: 40 TABLET, FILM COATED ORAL at 10:42

## 2019-12-27 RX ADMIN — GABAPENTIN 600 MG: 300 CAPSULE ORAL at 10:24

## 2019-12-27 RX ADMIN — SODIUM CHLORIDE, PRESERVATIVE FREE 10 ML: 5 INJECTION INTRAVENOUS at 22:14

## 2019-12-27 RX ADMIN — SENNOSIDES 8.6 MG: 8.6 TABLET, FILM COATED ORAL at 22:01

## 2019-12-27 RX ADMIN — SODIUM CHLORIDE, PRESERVATIVE FREE 10 ML: 5 INJECTION INTRAVENOUS at 10:27

## 2019-12-27 RX ADMIN — RANOLAZINE 1000 MG: 500 TABLET, FILM COATED, EXTENDED RELEASE ORAL at 10:42

## 2019-12-27 ASSESSMENT — PAIN DESCRIPTION - PAIN TYPE
TYPE: CHRONIC PAIN

## 2019-12-27 ASSESSMENT — PAIN SCALES - GENERAL
PAINLEVEL_OUTOF10: 0
PAINLEVEL_OUTOF10: 7
PAINLEVEL_OUTOF10: 3
PAINLEVEL_OUTOF10: 8
PAINLEVEL_OUTOF10: 0

## 2019-12-27 ASSESSMENT — PAIN DESCRIPTION - ORIENTATION
ORIENTATION: LEFT;RIGHT
ORIENTATION: RIGHT;LEFT
ORIENTATION: RIGHT;LEFT

## 2019-12-27 ASSESSMENT — PAIN DESCRIPTION - FREQUENCY: FREQUENCY: INTERMITTENT

## 2019-12-27 ASSESSMENT — PAIN DESCRIPTION - LOCATION
LOCATION: LEG

## 2019-12-27 ASSESSMENT — PAIN DESCRIPTION - ONSET: ONSET: ON-GOING

## 2019-12-27 ASSESSMENT — PAIN - FUNCTIONAL ASSESSMENT: PAIN_FUNCTIONAL_ASSESSMENT: PREVENTS OR INTERFERES SOME ACTIVE ACTIVITIES AND ADLS

## 2019-12-27 ASSESSMENT — PAIN DESCRIPTION - PROGRESSION: CLINICAL_PROGRESSION: GRADUALLY WORSENING

## 2019-12-27 ASSESSMENT — PAIN DESCRIPTION - DESCRIPTORS: DESCRIPTORS: ACHING;NUMBNESS;TINGLING

## 2019-12-27 NOTE — PROGRESS NOTES
[facility]  Internal Medicine Residency / [address]    Attending Physician Statement  I have discussed the case, including pertinent history and exam findings with the resident and the team.  I have seen and examined the patient and the key elements of the encounter have been performed by me. I agree with the assessment, plan and orders as documented by the resident. Fecal stasis x 9 days   Pulse OX drops below 90% with any exertion  H& L clear  Sacral edema trace   Will order new 2 D ECHO  Arrange O2 at home  Review meds , may decrease NTG to restart beta blockers             Unblock colon , try Senna   Remainder of medical problems as per resident note.       [facility]  Internal Medicine Residency Faculty

## 2019-12-27 NOTE — ADT AUTH CERT
Heart Failure - Care Day 4 (12/26/2019) by Re Poon RN         Review Status Review Entered   Completed 12/27/2019 07:32       Criteria Review      Care Day: 4 Care Date: 12/26/2019 Level of Care: Intermediate Care    Guideline Day 2    Clinical Status    (X) * Hemodynamic stability    12/27/2019 7:32 AM EST by Monse Gordillo      97.4 86 20 110/53 96% O2 2l/m/nc    (X) * Mental status at baseline    (X) * MI excluded    (X) * Cardiac rate and rhythm acceptable    ( ) * Oxygenation at baseline or improved    ( ) * Pulmonary edema absent or improved    Routes    (X) Oral or parenteral medications    Interventions    (X) * Pulmonary catheter absent    (X) Weigh    12/27/2019 7:32 AM EST by Monse Gordillo      218 lb 14.4 oz    Medications    (X) Diuretics    12/27/2019 7:32 AM EST by Monse Gordillo      bumetanide (BUMEX) tablet 1 mg   Dose: 1 mg  Freq: DAILY Route: PO    * Milestone   Additional Notes   12/26/2019      IM Note:      Neurology   AAOx3   CT head unremarkable       Cardiology   Acute hypoxic resp failure   2/2 decompensated HFrEF   Will check saturation when moving from bed to wheelchair! Lopressor, lisinorpil and bumex,    proBNP 7840, baseline < 1200   Trop 0.08 elevated on baseline   Will hold . lisinopril and lopressor for now   On bumex    Urine output 550   Daily weights       CAD s/p failed PCI   LHC at [facility] in 12/2019   On ASA, statin, plavix   On ranolazine,Imdur   Cont ranolazine and imdur       Hypotension - stable    2/2 possible due to lasix   Better with 500 ml bolus   Better now   Will resume lisinopril today    Hold metoprolol and norvasc at discharge!  Ask her to check BP daily and bring log to next visit        MATTHEW   2/2 prerenal from diuresis   Baseline creatinine 1   Creatinine pending today   Monitor urine output last 24 hrs 550 ml   Hold BP meds and lisinopril       Vertigo   Possible 2/2 meds vs diabetic neuropathy       Type II DM   On lantus 65, 18 units premeal   a1c in dec 2019 9.9   On neurontin   On  LDSS   BS as per AC and HS       HLD   On lipitor and fenofibrate       HTN   Was On norvasc, lopressor, lisinopril   Resume lisinopril    Hold metoprolol and norvasc at discharge!  Ask her to check BP daily and bring log to next visit        MDD   On sertraline 100       GERD   PPI daily       Adrenal insuff   On solucortef  last admission   Tapered      Geriatric Medicine:      A&O   BMP improved    VS stable and BP controlled on less meds   Still having some KUMAR   Trace sacral edema    Impression; CHF systolic                        Hypotension and low Volume on admission   Plan; Discharge planning           Updated: 12/26/19 0720      Sodium 143 mmol/L      Potassium reflex Magnesium 4.8 mmol/L      Chloride 102 mmol/L      CO2 27 mmol/L      Anion Gap 14 mmol/L      Glucose 132 mg/dL      BUN 25 mg/dL      CREATININE 1.3 mg/dL      GFR Non-African American 42 mL/min/1.73      GFR African American 50     Calcium 9.1                 Heart Failure - Care Day 3 (12/25/2019) by Barry Aguila RN         Review Status Review Entered   Completed 12/27/2019 07:30       Criteria Review      Care Day: 3 Care Date: 12/25/2019 Level of Care: Intermediate Care    Guideline Day 2    Clinical Status    (X) * Hemodynamic stability    12/27/2019 7:30 AM EST by Lexi Velasquez      116/58 92 18 97.8 84% room air-95% O2 2l/m/nc    (X) * Mental status at baseline    (X) * MI excluded    (X) * Cardiac rate and rhythm acceptable    ( ) * Oxygenation at baseline or improved    ( ) * Pulmonary edema absent or improved    Routes    (X) Oral or parenteral medications    12/27/2019 7:30 AM EST by [facility]      0.9 % sodium chloride infusion   Rate: 100 mL/hr Freq: CONTINUOUS Route: IV    Interventions    (X) * Pulmonary catheter absent    (X) Weigh    12/27/2019 7:30 AM EST by Lexibryan Velasquez      216 lb 12.8 oz    Medications    (X) Diuretics    12/27/2019 7:30 AM EST by Sarabjit Montez

## 2019-12-27 NOTE — PROGRESS NOTES
without assistance. No wheelchair in room to attempt wheelchair propulsion. Pt stating she doesn't have difficulty with this anyway. Pt at or near her physical baseline at this time. Stayed sitting EOB. Pt with all needs met and call light within reach. Pt will benefit from further skilled PT to address functional deficits described above. Pts/ family goals   1. Return home     Patient and or family understand(s) diagnosis, prognosis, and plan of care. Yes     PLAN  --PT care will be provided in accordance with the objectives noted above. Whenever appropriate, clear delegation orders will be provided for nursing staff. Exercises and functional mobility practice will be used as well as appropriate assistive devices or modalities to obtain goals. Patient and family education will also be administered as needed. --Frequency of treatments: 2-5x/week x 7-10 days.     Time in  0730  Time out  [facility] PT, BOBY  [id number]

## 2019-12-27 NOTE — PROGRESS NOTES
04/16/2012    CREATININE 1.2 12/27/2019    CALCIUM 8.8 12/27/2019    GFRAA 55 12/27/2019    LABGLOM 46 12/27/2019    GLUCOSE 113 12/27/2019    GLUCOSE 264 04/17/2012       Resident's Assessment and Plan     The patient is a 62 y.o. female with PMH of HTN,CKD, PVD, s/p BKA, L AKA, DM II, EF of 38% from echo 12/2019, CAD with failed PCI in 2017, presented to ED with sob today to ED, she was recently admitted for acute exacerbation of HF, with LA and was discharged with lasix on 12/17/2019. She had a LHC at [facility] on 12/11/2019 showed EF of 38%, with 30% stenosis in the dLMT, 60% in the ostial LAD, 99% narrowing of LAD, dLCx 70% narrowung and 80% marginal CX. She also complained of some whooshing sound in her head in ED associated with dizziness with no LOC, tingling in both ears, with nausea. she denied fever, chills, sick contacts, no vomiting, chest pain, no diarrhea     Neurology  AAOx3  CT head unremarkable     Cardiology  Acute hypoxic resp failure  2/2 decompensated HFrEF  On one L oxygen  Lopressor, lisinorpil and bumex,   proBNP 7840, baseline < 1200  Trop 0.08 elevated on baseline  Will hold . lisinopril and lopressor for now  On bumex   Urine output 550  Daily weights  Will order 2D ECHO to assess LVF  Her saturations dropped to 86% when she moved from bed to chair  May benefit with home oxygen    CAD s/p failed PCI  LHC at [facility] in 12/2019  On ASA, statin, plavix  On ranolazine,Imdur  Cont ranolazine and imdur     Hypotension  2/2 possible due to lasix  Better with 500 ml bolus  Better now  Monitor vitals and I&O     MATTHEW  2/2 prerenal from diuresis  Baseline creatinine 1  Creatinine pending today  Monitor urine output last 24 hrs 550 ml  Hold BP meds and lisinopril  For strict I &O today and if normal urine output  Can discharge tomorrow     Vertigo  Possible 2/2 meds vs diabetic neuropathy     Type II DM  On lantus 65, 18 units premeal  a1c in dec 2019 9.9  On neurontin  On  LDSS  BS as per AC and HS     HLD  On

## 2019-12-27 NOTE — PROGRESS NOTES
Occupational Therapy  OCCUPATIONAL THERAPY INITIAL EVALUATION      Date:2019  Patient Name: Isai Rodriguez  MRN: [id number]  : 1960  Room: [address] [place] [address]    Evaluating OT: Archana Watson OTR/L #[id number]     AM-PAC Daily Activity Raw Score:     Recommended Adaptive Equipment:  TBD     Diagnosis: Hypoxia    Patient presented to ED for SOB     Pertinent Medical History: L AKA, R BKA, MATTHEW, anemia, atherosclerosis, CAD, CHF, Diabetic neuropathy, HTN, PVD, DM     Precautions:  Falls, L AKA, R BKA, O2     Home Living: Pt lives with  in a 1 story home with 3 PHILLIP and temporary ramp   Bathroom setup: tub/shower combo with bench   Equipment owned: BSC, tub bench, power w/c, slideboard    Prior Level of Function: mod I with ADLs , mod I with IADLs: completed scoot transfers / slideboard transfer for car;  Power w/c for mobility   Driving: no  Occupation: na    Pain Level: Pt denies pain this session    Cognition: A&O: 4/4; Follows 2 step directions   Memory:  good   Sequencing:  fair   Problem solving:  fair   Judgement/safety:  fair     Functional Assessment:   Initial Eval Status  Date: 19 Treatment Status  Date: Short Term Goals  Treatment frequency: PRN   Feeding Independent       Grooming Mod I  (seated at EOB)      UB Dressing Modified [place]       LB Dressing Supervision   Modified [place]    Bathing Supervision  Modified [place]    Toileting Supervision   Modified [place]    Bed Mobility  Supine to sit: NT   Sit to supine: NT  Scooting: supervision   Supine to sit: Modified [place]   Sit to supine: Modified [place]   Scooting: Modified [place]    Functional Transfers Supervision    (scoot pivot to chair)   Modified [place]    Functional Mobility  NA      Balance Sitting:     Static:  indep    Dynamic:supervision  Standing: NA     Activity Tolerance F-    Pt demonstrating SOB with minimal exertion;  98%, desat to 90% with activity / scoot pivot - cuing on pursed lip breathing  F+   Visual/  Perceptual Glasses: yes  wfl                  Hand dominance: right      Strength ROM Additional Info:    RUE   4/5 wfl good  and wfl FMC/dexterity noted during ADL tasks     LUE 4/5 wfl good  and wfl FMC/dexterity noted during ADL tasks     Hearing: wfl  Sensation: numbness / tingling of B hand and LEs  Tone: wfl  Edema:none noted                             Comments/Treatment: Upon arrival, patient supine in bed and agreeable to OT Session with PT collaboration. Therapist facilitated unsupported sitting balance at EOB and functional scoot transfers (bed<>chair;  + SOB with minimal activity)- skilled cuing on hand placement, posture, body mechanics, energy conservation techniques and safety. Therapist facilitated self-care retraining: UB self-care tasks (simulated gown) and grooming task while educating pt on modified techniques, posture, safety and energy conservation techniques. Skilled monitoring of HR, O2 sats and pts response to treatment (98% at rest, desat to 90% with scoot transfer; + SOB. Cuing on pursed lip breathing techniques). Pt demonstrating fair understanding of education/techniques, requiring additional training / education. At end of session, patient seated in chair with call light and phone within reach, all lines intact. Pt would benefit from continued skilled OT to increase functional independence and quality of life.     Eval Complexity: Low    (Evaluation time includes thorough review of current medical information, gathering information on past medical history/social history and prior level of function, completion of standardized testing/informal observation of tasks, assessment of data, and development of POC/Goals.)      Assessment of current deficits   Functional mobility [x]  ADLs [x] Strength [x]  Cognition []  Functional transfers  [x] IADLs [x] Safety Awareness [x]  Endurance [x]  Fine Motor Coordination [] Balance [x] Vision/perception [] Sensation []   Gross Motor Coordination [] ROM [] Delirium []                  Motor Control []    Plan of Care:   ADL retraining [x]   Equipment needs [x]   Neuromuscular re-education [x] Energy Conservation Techniques [x]  Functional Transfer training [x] Patient and/or Family Education [x]  Functional Mobility training [x]  Environmental Modifications [x]  Cognitive re-training []   Compensatory techniques for ADLs [x]  Splinting Needs []   Positioning to improve overall function [x]   Therapeutic Activity [x]  Therapeutic Exercise  [x]  Visual/Perceptual: []    Delirium prevention/treatment  []   Other:  []    Rehab Potential: Good for established goals     Patient / Family Goal:  Not stated     Patient and/or family were instructed diagnosis, prognosis/goals and plan of care. Demonstrated fair understanding. [] Malnutrition indicators have been identified and nursing has been notified to ensure a dietitian consult is ordered.        AM-PAC Daily Activity Inpatient   How much help for putting on and taking off regular lower body clothing?: A Little  How much help for Bathing?: A Little  How much help for Toileting?: A Little  How much help for putting on and taking off regular upper body clothing?: None  How much help for taking care of personal grooming?: None  How much help for eating meals?: None  AM-PAC Inpatient Daily Activity Raw Score: 21  AM-PAC Inpatient ADL T-Scale Score : 44.27  ADL Inpatient CMS 0-100% Score: 32.79  ADL Inpatient CMS G-Code Modifier : CJ       low Evaluation + 9 timed treatment minutes  Tx Time in: 733  Tx Time out: [address] OTR/L #[id number]

## 2019-12-28 ENCOUNTER — APPOINTMENT (OUTPATIENT)
Dept: ULTRASOUND IMAGING | Age: 59
DRG: 194 | End: 2019-12-28
Payer: MEDICAID

## 2019-12-28 ENCOUNTER — APPOINTMENT (OUTPATIENT)
Dept: GENERAL RADIOLOGY | Age: 59
DRG: 194 | End: 2019-12-28
Payer: MEDICAID

## 2019-12-28 DIAGNOSIS — E11.42 TYPE 2 DIABETES MELLITUS WITH DIABETIC POLYNEUROPATHY, UNSPECIFIED WHETHER LONG TERM INSULIN USE (HCC): ICD-10-CM

## 2019-12-28 LAB
ANION GAP SERPL CALCULATED.3IONS-SCNC: 15 MMOL/L (ref 7–16)
B.E.: 1.4 MMOL/L (ref -3–3)
BILIRUBIN URINE: ABNORMAL
BLOOD CULTURE, ROUTINE: NORMAL
BLOOD, URINE: NEGATIVE
BUN BLDV-MCNC: 30 MG/DL (ref 8–23)
CALCIUM SERPL-MCNC: 8.8 MG/DL (ref 8.6–10.2)
CHLORIDE BLD-SCNC: 100 MMOL/L (ref 98–107)
CHLORIDE URINE RANDOM: <20 MMOL/L
CLARITY: CLEAR
CO2: 25 MMOL/L (ref 22–29)
COHB: 1.3 % (ref 0–1.5)
COLOR: YELLOW
CREAT SERPL-MCNC: 1.7 MG/DL (ref 0.5–1)
CREATININE URINE: 182 MG/DL (ref 29–226)
CRITICAL: ABNORMAL
DATE ANALYZED: ABNORMAL
DATE OF COLLECTION: ABNORMAL
FOLATE: 18 NG/ML (ref 4.8–24.2)
GFR AFRICAN AMERICAN: 37
GFR NON-AFRICAN AMERICAN: 31 ML/MIN/1.73
GLUCOSE BLD-MCNC: 204 MG/DL (ref 74–99)
GLUCOSE URINE: NEGATIVE MG/DL
HCO3: 27.3 MMOL/L (ref 22–26)
HHB: 6 % (ref 0–5)
KETONES, URINE: ABNORMAL MG/DL
LAB: ABNORMAL
LEUKOCYTE ESTERASE, URINE: NEGATIVE
Lab: ABNORMAL
METER GLUCOSE: 170 MG/DL (ref 74–99)
METER GLUCOSE: 202 MG/DL (ref 74–99)
METER GLUCOSE: 226 MG/DL (ref 74–99)
METER GLUCOSE: 229 MG/DL (ref 74–99)
METHB: 0.3 % (ref 0–1.5)
MODE: ABNORMAL
NITRITE, URINE: NEGATIVE
O2 CONTENT: 13.5 ML/DL
O2 SATURATION: 93.9 % (ref 92–98.5)
O2HB: 92.4 % (ref 94–97)
OPERATOR ID: 2156
OSMOLALITY URINE: 437 MOSM/KG (ref 300–900)
PATIENT TEMP: 37 C
PCO2: 48.9 MMHG (ref 35–45)
PH BLOOD GAS: 7.36 (ref 7.35–7.45)
PH UA: 5 (ref 5–9)
PO2: 72.8 MMHG (ref 60–100)
POTASSIUM REFLEX MAGNESIUM: 4.8 MMOL/L (ref 3.5–5)
POTASSIUM, UR: 61.4 MMOL/L
PROCALCITONIN: 0.03 NG/ML (ref 0–0.08)
PROTEIN PROTEIN: 15 MG/DL (ref 0–12)
PROTEIN UA: NEGATIVE MG/DL
PROTEIN/CREAT RATIO: 0.1
PROTEIN/CREAT RATIO: 0.1 (ref 0–0.2)
SODIUM BLD-SCNC: 140 MMOL/L (ref 132–146)
SODIUM URINE: 51 MMOL/L
SOURCE, BLOOD GAS: ABNORMAL
SPECIFIC GRAVITY UA: >=1.03 (ref 1–1.03)
THB: 10.3 G/DL (ref 11.5–16.5)
TIME ANALYZED: 1709
UREA NITROGEN, UR: 415 MG/DL (ref 800–1666)
UROBILINOGEN, URINE: 1 E.U./DL
VITAMIN B-12: 613 PG/ML (ref 211–946)

## 2019-12-28 PROCEDURE — 82746 ASSAY OF FOLIC ACID SERUM: CPT

## 2019-12-28 PROCEDURE — 71045 X-RAY EXAM CHEST 1 VIEW: CPT

## 2019-12-28 PROCEDURE — 74018 RADEX ABDOMEN 1 VIEW: CPT

## 2019-12-28 PROCEDURE — 6370000000 HC RX 637 (ALT 250 FOR IP): Performed by: INTERNAL MEDICINE

## 2019-12-28 PROCEDURE — 99233 SBSQ HOSP IP/OBS HIGH 50: CPT | Performed by: INTERNAL MEDICINE

## 2019-12-28 PROCEDURE — 36415 COLL VENOUS BLD VENIPUNCTURE: CPT

## 2019-12-28 PROCEDURE — 81003 URINALYSIS AUTO W/O SCOPE: CPT

## 2019-12-28 PROCEDURE — 82962 GLUCOSE BLOOD TEST: CPT

## 2019-12-28 PROCEDURE — 84540 ASSAY OF URINE/UREA-N: CPT

## 2019-12-28 PROCEDURE — 80048 BASIC METABOLIC PNL TOTAL CA: CPT

## 2019-12-28 PROCEDURE — 84156 ASSAY OF PROTEIN URINE: CPT

## 2019-12-28 PROCEDURE — 84145 PROCALCITONIN (PCT): CPT

## 2019-12-28 PROCEDURE — 82805 BLOOD GASES W/O2 SATURATION: CPT

## 2019-12-28 PROCEDURE — 83935 ASSAY OF URINE OSMOLALITY: CPT

## 2019-12-28 PROCEDURE — 2140000000 HC CCU INTERMEDIATE R&B

## 2019-12-28 PROCEDURE — 2700000000 HC OXYGEN THERAPY PER DAY

## 2019-12-28 PROCEDURE — 2580000003 HC RX 258: Performed by: INTERNAL MEDICINE

## 2019-12-28 PROCEDURE — 82570 ASSAY OF URINE CREATININE: CPT

## 2019-12-28 PROCEDURE — 6360000002 HC RX W HCPCS: Performed by: INTERNAL MEDICINE

## 2019-12-28 PROCEDURE — 76770 US EXAM ABDO BACK WALL COMP: CPT

## 2019-12-28 PROCEDURE — 82436 ASSAY OF URINE CHLORIDE: CPT

## 2019-12-28 PROCEDURE — 82607 VITAMIN B-12: CPT

## 2019-12-28 PROCEDURE — 84133 ASSAY OF URINE POTASSIUM: CPT

## 2019-12-28 PROCEDURE — 84300 ASSAY OF URINE SODIUM: CPT

## 2019-12-28 RX ORDER — COSYNTROPIN 0.25 MG/ML
250 INJECTION, POWDER, FOR SOLUTION INTRAMUSCULAR; INTRAVENOUS ONCE
Status: COMPLETED | OUTPATIENT
Start: 2019-12-29 | End: 2019-12-29

## 2019-12-28 RX ORDER — GABAPENTIN 400 MG/1
400 CAPSULE ORAL 3 TIMES DAILY
Status: DISCONTINUED | OUTPATIENT
Start: 2019-12-28 | End: 2020-01-02 | Stop reason: HOSPADM

## 2019-12-28 RX ORDER — DEXAMETHASONE 1 MG
1 TABLET ORAL ONCE
Status: COMPLETED | OUTPATIENT
Start: 2019-12-28 | End: 2019-12-28

## 2019-12-28 RX ORDER — SODIUM CHLORIDE 9 MG/ML
INJECTION, SOLUTION INTRAVENOUS CONTINUOUS
Status: ACTIVE | OUTPATIENT
Start: 2019-12-28 | End: 2019-12-29

## 2019-12-28 RX ADMIN — INSULIN LISPRO 18 UNITS: 100 INJECTION, SOLUTION INTRAVENOUS; SUBCUTANEOUS at 17:02

## 2019-12-28 RX ADMIN — FOLIC ACID 1 MG: 1 TABLET ORAL at 09:16

## 2019-12-28 RX ADMIN — INSULIN LISPRO 1 UNITS: 100 INJECTION, SOLUTION INTRAVENOUS; SUBCUTANEOUS at 21:51

## 2019-12-28 RX ADMIN — PANTOPRAZOLE SODIUM 40 MG: 40 TABLET, DELAYED RELEASE ORAL at 06:59

## 2019-12-28 RX ADMIN — INSULIN LISPRO 18 UNITS: 100 INJECTION, SOLUTION INTRAVENOUS; SUBCUTANEOUS at 07:07

## 2019-12-28 RX ADMIN — INSULIN LISPRO 1 UNITS: 100 INJECTION, SOLUTION INTRAVENOUS; SUBCUTANEOUS at 17:06

## 2019-12-28 RX ADMIN — ISOSORBIDE MONONITRATE 30 MG: 30 TABLET ORAL at 09:16

## 2019-12-28 RX ADMIN — INSULIN LISPRO 18 UNITS: 100 INJECTION, SOLUTION INTRAVENOUS; SUBCUTANEOUS at 11:30

## 2019-12-28 RX ADMIN — ASPIRIN 81 MG: 81 TABLET, COATED ORAL at 09:17

## 2019-12-28 RX ADMIN — INSULIN LISPRO 2 UNITS: 100 INJECTION, SOLUTION INTRAVENOUS; SUBCUTANEOUS at 07:08

## 2019-12-28 RX ADMIN — ATORVASTATIN CALCIUM 40 MG: 40 TABLET, FILM COATED ORAL at 09:20

## 2019-12-28 RX ADMIN — ENOXAPARIN SODIUM 40 MG: 40 INJECTION SUBCUTANEOUS at 09:15

## 2019-12-28 RX ADMIN — SODIUM CHLORIDE: 9 INJECTION, SOLUTION INTRAVENOUS at 12:58

## 2019-12-28 RX ADMIN — RANOLAZINE 1000 MG: 500 TABLET, FILM COATED, EXTENDED RELEASE ORAL at 09:16

## 2019-12-28 RX ADMIN — DEXAMETHASONE 1 MG: 1 TABLET ORAL at 13:15

## 2019-12-28 RX ADMIN — SODIUM CHLORIDE, PRESERVATIVE FREE 10 ML: 5 INJECTION INTRAVENOUS at 21:37

## 2019-12-28 RX ADMIN — GABAPENTIN 400 MG: 400 CAPSULE ORAL at 21:36

## 2019-12-28 RX ADMIN — SENNOSIDES 8.6 MG: 8.6 TABLET, FILM COATED ORAL at 21:36

## 2019-12-28 RX ADMIN — INSULIN GLARGINE 65 UNITS: 100 INJECTION, SOLUTION SUBCUTANEOUS at 21:51

## 2019-12-28 RX ADMIN — GABAPENTIN 600 MG: 300 CAPSULE ORAL at 09:16

## 2019-12-28 RX ADMIN — RANOLAZINE 1000 MG: 500 TABLET, FILM COATED, EXTENDED RELEASE ORAL at 21:36

## 2019-12-28 RX ADMIN — INSULIN LISPRO 2 UNITS: 100 INJECTION, SOLUTION INTRAVENOUS; SUBCUTANEOUS at 11:33

## 2019-12-28 RX ADMIN — SODIUM CHLORIDE: 9 INJECTION, SOLUTION INTRAVENOUS at 21:51

## 2019-12-28 RX ADMIN — SODIUM CHLORIDE, PRESERVATIVE FREE 10 ML: 5 INJECTION INTRAVENOUS at 09:16

## 2019-12-28 RX ADMIN — SENNOSIDES 8.6 MG: 8.6 TABLET, FILM COATED ORAL at 09:16

## 2019-12-28 RX ADMIN — CLOPIDOGREL 75 MG: 75 TABLET, FILM COATED ORAL at 09:16

## 2019-12-28 RX ADMIN — GABAPENTIN 400 MG: 400 CAPSULE ORAL at 14:17

## 2019-12-28 RX ADMIN — SERTRALINE 100 MG: 100 TABLET, FILM COATED ORAL at 09:17

## 2019-12-28 ASSESSMENT — PAIN SCALES - GENERAL
PAINLEVEL_OUTOF10: 0

## 2019-12-28 NOTE — CONSULTS
Department of Internal Medicine  Nephrology Attending Consult Note      Reason for Consult: MATTHEW  Requesting Physician:  Dr Mackenzie Lara:  weakness    History Obtained From:  patient, electronic medical record    HISTORY OF PRESENT ILLNESS:    Briefly Mrs. Graciela Dotson is a 47-year-old female with history of HTN, type II DM, hyperlipidemia, CAD, HFrEF 38%, PVD status post right BKA and left AKA, who was admitted on December 13, 2019 with altered mental status. Upon admission her creatinine level was 1.2 mg/dL and had increased to 1.7 mg/dL reason for this consultation. had an episode of low blood pressure yesterday. Reports no nausea vomiting or diarrhea. Reports some shortness of breath.     Past Medical History:        Diagnosis Date    MATTHEW (acute kidney injury) ([address]     Anemia     Aorto-iliac atherosclerosis ([address] 3/15/2016    Atherosclerosis of native arteries of left leg with ulceration of other part of foot ([address] 5/10/2017    Atherosclerosis of native arteries of the extremities with gangrene ([address] 6/20/2017    Atherosclerosis of native arteries of the extremities with ulceration(440.23) 2/21/2014    Atherosclerosis of native artery of left lower extremity with ulceration ([address] 2/26/2017    Blood transfusion reaction     CAD (coronary artery disease)     Charcot's joint of foot due to diabetes ([address] 2/21/2014    CHF (congestive heart failure) (HCC)     Chronic osteomyelitis, ankle and foot     Depression     Diabetic neuropathy (HCC)     Diabetic ulcer of left foot associated with type 1 diabetes mellitus ([address] 2/26/2017    Femoral-popliteal atherosclerosis ([address] 3/15/2016    Foot ulceration ([address] 10/28/2014    History of blood transfusion     HTN (hypertension)     Hyperlipidemia     Ischemic cardiomyopathy     EF 40%    Mitral regurgitation     Non-pressure chronic ulcer of left lower leg with fat layer exposed ([address] 6/29/2016    PVD (peripheral vascular disease) ([address]     Systolic heart failure ([facility] 10/20/13    10/20/13-echocardiogram revealed an LVEF of 35%    Type II or unspecified type diabetes mellitus with other specified manifestations, not stated as uncontrolled     Ulcer of leg due to secondary diabetes ([facility] 3/15/2016    Ulcer of other part of foot     Unspecified diseases of blood and blood-forming organs      Past Surgical History:        Procedure Laterality Date    ABDOMEN SURGERY      ABOVE KNEE AMPUTATION Left 06/23/2017    with Dr. Allison Hunter, ARTERY Right 12/18/2013    R SFA 6x200 mm, [place]    CARDIAC CATHETERIZATION  12/2012    [facility] in [address] [place] [address]  5/29/2013    Dr. Oliva Marquez  06/22/2017    Dr. Brian White      x 3    ECHO COMPL W DOP COLOR FLOW  9/22/2012 9/22/2012-echocardiogram revealed an LVEF of 60%    ECHO COMPL W DOP COLOR FLOW  1/28/2013         ECHO COMPL W DOP COLOR FLOW  10/20/2013         ECHO COMPLETE  12/13/2013         FOOT SURGERY  12/15/2013    incision and drainage with bone biopsy    LEG AMPUTATION BELOW KNEE Right 2/27/14    right below the knee amp    TOE AMPUTATION      right foot    TONSILLECTOMY      TRANSESOPHAGEAL ECHOCARDIOGRAM  10/23/2013         VASCULAR SURGERY       Current Medications:    Current Facility-Administered Medications: 0.9 % sodium chloride infusion, , Intravenous, Continuous  dexamethasone (DECADRON) tablet 1 mg, 1 mg, Oral, Once  senna (SENOKOT) tablet 8.6 mg, 1 tablet, Oral, BID  [Held by provider] lisinopril (PRINIVIL;ZESTRIL) tablet 5 mg, 5 mg, Oral, Daily  [Held by provider] bumetanide (BUMEX) tablet 1 mg, 1 mg, Oral, Daily  insulin lispro (HUMALOG) injection vial 0-6 Units, 0-6 Units, Subcutaneous, TID WC  insulin lispro (HUMALOG) injection vial 0-3 Units, 0-3 Units, Subcutaneous, Nightly  oxyCODONE-acetaminophen (PERCOCET) 5-325 MG per tablet 1 tablet, 1 tablet, Oral, Q6H PRN  gabapentin (NEURONTIN) capsule 600 mg, 600 mg, Oral, TID  pantoprazole (PROTONIX) tablet 40 mg, 40 mg, Oral, QAM AC  ranolazine (RANEXA) extended release tablet 1,000 mg, 1,000 mg, Oral, BID  folic acid (FOLVITE) tablet 1 mg, 1 mg, Oral, Daily  aspirin EC tablet 81 mg, 81 mg, Oral, Daily  atorvastatin (LIPITOR) tablet 40 mg, 40 mg, Oral, Daily  docusate sodium (COLACE) capsule 100 mg, 100 mg, Oral, Daily PRN  insulin glargine (LANTUS) injection vial 65 Units, 65 Units, Subcutaneous, Nightly  insulin lispro (HUMALOG) injection vial 18 Units, 18 Units, Subcutaneous, TID AC  sertraline (ZOLOFT) tablet 100 mg, 100 mg, Oral, Daily  clopidogrel (PLAVIX) tablet 75 mg, 75 mg, Oral, Daily  glucose (GLUTOSE) 40 % oral gel 15 g, 15 g, Oral, PRN  dextrose 50 % IV solution, 12.5 g, Intravenous, PRN  glucagon (rDNA) injection 1 mg, 1 mg, Intramuscular, PRN  dextrose 5 % solution, 100 mL/hr, Intravenous, PRN  [Held by provider] isosorbide mononitrate (IMDUR) extended release tablet 30 mg, 30 mg, Oral, Daily  sodium chloride flush 0.9 % injection 10 mL, 10 mL, Intravenous, 2 times per day  sodium chloride flush 0.9 % injection 10 mL, 10 mL, Intravenous, PRN  magnesium hydroxide (MILK OF MAGNESIA) 400 MG/5ML suspension 30 mL, 30 mL, Oral, Daily PRN  ondansetron (ZOFRAN) injection 4 mg, 4 mg, Intravenous, Q6H PRN  enoxaparin (LOVENOX) injection 40 mg, 40 mg, Subcutaneous, Daily  acetaminophen (TYLENOL) tablet 650 mg, 650 mg, Oral, Q4H PRN  Allergies:  Effexor [venlafaxine hcl]    Social History:    TOBACCO:  Never used tobacco  ETOH:  Never drank alcohol  DRUGS:  Never used recreational drugs    Family History:       Problem Relation Age of Onset    Diabetes Mother     Hypertension Mother     Diabetes Father     Hypertension Father     Heart Failure Father     Cancer Brother      REVIEW OF SYSTEMS:    CONSTITUTIONAL:  positive for  fatigue  EYES:  negative  HEENT:  negative  RESPIRATORY:  positive for dyspnea  CARDIOVASCULAR:  positive for  dyspnea  GASTROINTESTINAL:  negative  GENITOURINARY:  positive for frequency and dysuria  INTEGUMENT/BREAST:  negative  HEMATOLOGIC/LYMPHATIC:  negative  ALLERGIC/IMMUNOLOGIC:  negative  ENDOCRINE:  positive for weight changes  MUSCULOSKELETAL:  positive for  muscle weakness  NEUROLOGICAL:  negative  BEHAVIOR/PSYCH:  negative  PHYSICAL EXAM:      Vitals:    VITALS:  BP (!) 115/55   Pulse 97   Temp 97.2 °F (36.2 °C) (Temporal)   Resp 16   Ht 5' (1.524 m)   Wt 223 lb 6.4 oz (101.3 kg)   SpO2 97%   BMI 43.63 kg/m²   24HR RESPIRATORY RATE RANGE:  Resp  Av.3  Min: 16  Max: 18  24HR BLOOD PRESSURE RANGE:  Systolic (00UQS), PQF:480 , Min:110 , DHB:403   ; Diastolic (27BZW), HPN:15, Min:55, Max:60    24HR INTAKE/OUTPUT:    Intake/Output Summary (Last 24 hours) at 2019 1227  Last data filed at 2019 0856  Gross per 24 hour   Intake 2160 ml   Output 1000 ml   Net 1160 ml     8HR INTAKE OUTPUT:  In: 720 [P.O.:720]  Out: 350 [Urine:350]     Constitutional: Patient is awake alert in no distress  HEENT: Pupils are equal reactive  Respiratory: Decreased breath sounds at the bases  Cardiovascular/Edema: Heart sounds are regular rate  Gastrointestinal: Abdomen soft, nontender  Neurologic: Nonfocal  Skin: No lesions  Other: +trace Edema , right BKA and left AKA    DATA:    CBC:   Lab Results   Component Value Date    WBC 7.4 2019    RBC 3.20 2019    HGB 9.7 2019    HCT 33.5 2019    .7 2019    MCH 30.3 2019    MCHC 29.0 2019    RDW 15.5 2019     2019    MPV 9.6 2019     CMP:    Lab Results   Component Value Date     2019    K 4.8 2019     2019    CO2 25 2019    BUN 30 2019    CREATININE 1.7 2019    GFRAA 37 2019    LABGLOM 31 2019    GLUCOSE 204 2019    GLUCOSE 264 2012    PROT 6.5 2019    LABALBU 3.5 2019    LABALBU 4.3 04/16/2012    CALCIUM 8.8 12/28/2019    BILITOT 0.6 12/23/2019    ALKPHOS 90 12/23/2019    AST 35 12/23/2019    ALT 37 12/23/2019     Magnesium:    Lab Results   Component Value Date    MG 1.6 12/18/2019     Phosphorus:    Lab Results   Component Value Date    PHOS 4.2 12/18/2019     Uric Acid:  No results found for: LABURIC, URICACID  Last 3 Troponin:    Lab Results   Component Value Date    TROPONINI 0.08 12/23/2019    TROPONINI 0.13 12/14/2019    TROPONINI 0.16 12/14/2019     U/A:    Lab Results   Component Value Date    COLORU Yellow 12/23/2019    PROTEINU 100 12/23/2019    PHUR 5.0 12/23/2019    LABCAST FEW  01/24/2014    WBCUA 1-3 12/23/2019    WBCUA 0-1 12/26/2011    RBCUA 0-1 12/23/2019    RBCUA 0-1 01/24/2014    YEAST RARE 03/06/2017    BACTERIA RARE 12/23/2019    CLARITYU Clear 12/23/2019    SPECGRAV >=1.030 12/23/2019    LEUKOCYTESUR TRACE 12/23/2019    UROBILINOGEN 1.0 12/23/2019    BILIRUBINUR SMALL 12/23/2019    BILIRUBINUR SMALL 12/26/2011    BLOODU Negative 12/23/2019    GLUCOSEU Negative 12/23/2019    GLUCOSEU 500 12/26/2011     HgBA1c:    Lab Results   Component Value Date    LABA1C 9.9 12/02/2019     Microalbumen/Creatinine ratio:  No components found for: RUCREAT  Radiology Review:    Impression-   1. Mild pelvicalyceal dilatation of both right and left kidneys which   may be secondary to the large volume within an extensively dilated   urinary bladder. 2. No additional sonographic abnormalities of the right and left   kidneys. IMPRESSION/RECOMMENDATIONS:      Briefly Mrs. Flaquito Wall is a 55-year-old female with history of HTN, type II DM, hyperlipidemia, CAD, HFrEF 38%, PVD status post right BKA and left AKA, who was admitted on December 13, 2019 with altered mental status. Upon admission her creatinine level was 1.2 mg/dL and had increased to 1.7 mg/dL reason for this consultation. had an episode of low blood pressure yesterday. Reports no nausea vomiting or diarrhea.   Reports some shortness of breath. 1. MATTHEW stage I, probably hemodynamically mediated MATTHEW due hypotension/ischemic insult  2. Possible UTI  3. Status post hypotension/history of adrenal insufficiency- agree with dexamethasone and check cosyntropin stimulation test  4. HFrEF 38%, compensated  5. Anemia, macrocytic; with normal B12 and folate levels     Plan:    - agree with holding off ace inhibitors and diuretics  - start normal saline at 50 cc/hr  - check urine studies  - keep MAP >70 mm hg  - avoid nephrotoxic drugs  - monitor renal function daily  - check cosyntropin     Re Lisa MD    Thank you for this consultation. .. Latoya Gonzalez

## 2019-12-28 NOTE — PROGRESS NOTES
Messaged House team 1 that nurse feels patient is more lethargic than yesterday. They said they will be up to see the patient.

## 2019-12-28 NOTE — PROGRESS NOTES
[facility]  Internal Medicine Residency Program  Progress Note - House Team 1    Patient:  Gene Ramos 62 y.o. female MRN: [id number]     Date of Service: 12/28/2019     CC: sob and fatigue    Subjective     Patient examined at bedside,complains of constipation, and distention of abdomen, she denied any chest pain, dizziness , nausea or vomiting but still complains of constipation    Objective     Physical Exam:  · Vitals: BP (!) 114/57   Pulse 93   Temp 96.9 °F (36.1 °C) (Tympanic)   Resp 18   Ht 5' (1.524 m)   Wt 223 lb 6.4 oz (101.3 kg)   SpO2 94%   BMI 43.63 kg/m²     · I & O - 24hr: No intake/output data recorded. · General Appearance: alert, appears stated age and cooperative  · HEENT:  Head: Normal, normocephalic, atraumatic. · Neck: supple, symmetrical, trachea midline  · Lung: clear to auscultation bilaterally  · Heart: regular rate and rhythm, S1, S2 normal, no murmur, click, rub or gallop  · Abdomen: soft, non-tender; bowel sounds normal; no masses,  no organomegaly  · Extremities:  R BKA, L AKA  · Musculokeletal: R BKA and L aka.    · Neurologic: Mental status: Alert, oriented, thought content appropriate  Subject  Pertinent Labs & Imaging Studies   heather  CBC with Differential:    Lab Results   Component Value Date    WBC 7.4 12/24/2019    RBC 3.20 12/24/2019    HGB 9.7 12/24/2019    HCT 33.5 12/24/2019     12/24/2019    .7 12/24/2019    MCH 30.3 12/24/2019    MCHC 29.0 12/24/2019    RDW 15.5 12/24/2019    NRBC 1 09/21/2012    SEGSPCT 50 03/21/2014    LYMPHOPCT 12.4 12/23/2019    MONOPCT 5.3 12/23/2019    BASOPCT 0.7 12/23/2019    MONOSABS 0.44 12/23/2019    LYMPHSABS 1.07 12/23/2019    EOSABS 0.24 12/23/2019    BASOSABS 0.00 12/23/2019     BMP:    Lab Results   Component Value Date     12/27/2019    K 4.0 12/27/2019    CL 98 12/27/2019    CO2 27 12/27/2019    BUN 22 12/27/2019    LABALBU 3.5 12/23/2019    LABALBU 4.3 04/16/2012    CREATININE 1.2

## 2019-12-29 ENCOUNTER — APPOINTMENT (OUTPATIENT)
Dept: CT IMAGING | Age: 59
DRG: 194 | End: 2019-12-29
Payer: MEDICAID

## 2019-12-29 LAB
ALBUMIN SERPL-MCNC: 3.9 G/DL (ref 3.5–5.2)
ALP BLD-CCNC: 73 U/L (ref 35–104)
ALT SERPL-CCNC: 18 U/L (ref 0–32)
ANION GAP SERPL CALCULATED.3IONS-SCNC: 13 MMOL/L (ref 7–16)
ANION GAP SERPL CALCULATED.3IONS-SCNC: 16 MMOL/L (ref 7–16)
AST SERPL-CCNC: 13 U/L (ref 0–31)
BILIRUB SERPL-MCNC: 0.6 MG/DL (ref 0–1.2)
BUN BLDV-MCNC: 23 MG/DL (ref 8–23)
BUN BLDV-MCNC: 26 MG/DL (ref 8–23)
CALCIUM SERPL-MCNC: 8.9 MG/DL (ref 8.6–10.2)
CALCIUM SERPL-MCNC: 9 MG/DL (ref 8.6–10.2)
CHLORIDE BLD-SCNC: 102 MMOL/L (ref 98–107)
CHLORIDE BLD-SCNC: 105 MMOL/L (ref 98–107)
CO2: 23 MMOL/L (ref 22–29)
CO2: 23 MMOL/L (ref 22–29)
CORTISOL TOTAL: 1.36 MCG/DL (ref 2.68–18.4)
CORTISOL TOTAL: 15.28 MCG/DL (ref 2.68–18.4)
CORTISOL TOTAL: 23.66 MCG/DL (ref 2.68–18.4)
CREAT SERPL-MCNC: 1.1 MG/DL (ref 0.5–1)
CREAT SERPL-MCNC: 1.1 MG/DL (ref 0.5–1)
D DIMER: 289 NG/ML DDU
GFR AFRICAN AMERICAN: >60
GFR AFRICAN AMERICAN: >60
GFR NON-AFRICAN AMERICAN: 50 ML/MIN/1.73
GFR NON-AFRICAN AMERICAN: 50 ML/MIN/1.73
GLUCOSE BLD-MCNC: 237 MG/DL (ref 74–99)
GLUCOSE BLD-MCNC: 321 MG/DL (ref 74–99)
HCT VFR BLD CALC: 33.9 % (ref 34–48)
HEMOGLOBIN: 9.6 G/DL (ref 11.5–15.5)
LV EF: 50 %
LVEF MODALITY: NORMAL
MCH RBC QN AUTO: 30 PG (ref 26–35)
MCHC RBC AUTO-ENTMCNC: 28.3 % (ref 32–34.5)
MCV RBC AUTO: 105.9 FL (ref 80–99.9)
METER GLUCOSE: 240 MG/DL (ref 74–99)
METER GLUCOSE: 256 MG/DL (ref 74–99)
METER GLUCOSE: 297 MG/DL (ref 74–99)
METER GLUCOSE: 314 MG/DL (ref 74–99)
PDW BLD-RTO: 15.5 FL (ref 11.5–15)
PLATELET # BLD: 355 E9/L (ref 130–450)
PMV BLD AUTO: 9.6 FL (ref 7–12)
POTASSIUM SERPL-SCNC: 4.9 MMOL/L (ref 3.5–5)
POTASSIUM SERPL-SCNC: 5.6 MMOL/L (ref 3.5–5)
RBC # BLD: 3.2 E12/L (ref 3.5–5.5)
SODIUM BLD-SCNC: 138 MMOL/L (ref 132–146)
SODIUM BLD-SCNC: 144 MMOL/L (ref 132–146)
TOTAL PROTEIN: 6.6 G/DL (ref 6.4–8.3)
WBC # BLD: 7.5 E9/L (ref 4.5–11.5)

## 2019-12-29 PROCEDURE — 82533 TOTAL CORTISOL: CPT

## 2019-12-29 PROCEDURE — 2140000000 HC CCU INTERMEDIATE R&B

## 2019-12-29 PROCEDURE — 2580000003 HC RX 258: Performed by: INTERNAL MEDICINE

## 2019-12-29 PROCEDURE — 6370000000 HC RX 637 (ALT 250 FOR IP): Performed by: INTERNAL MEDICINE

## 2019-12-29 PROCEDURE — 85378 FIBRIN DEGRADE SEMIQUANT: CPT

## 2019-12-29 PROCEDURE — 93306 TTE W/DOPPLER COMPLETE: CPT

## 2019-12-29 PROCEDURE — 71275 CT ANGIOGRAPHY CHEST: CPT

## 2019-12-29 PROCEDURE — 36415 COLL VENOUS BLD VENIPUNCTURE: CPT

## 2019-12-29 PROCEDURE — 80048 BASIC METABOLIC PNL TOTAL CA: CPT

## 2019-12-29 PROCEDURE — 6360000002 HC RX W HCPCS: Performed by: INTERNAL MEDICINE

## 2019-12-29 PROCEDURE — 2700000000 HC OXYGEN THERAPY PER DAY

## 2019-12-29 PROCEDURE — 99233 SBSQ HOSP IP/OBS HIGH 50: CPT | Performed by: INTERNAL MEDICINE

## 2019-12-29 PROCEDURE — 80053 COMPREHEN METABOLIC PANEL: CPT

## 2019-12-29 PROCEDURE — 85027 COMPLETE CBC AUTOMATED: CPT

## 2019-12-29 PROCEDURE — 6360000004 HC RX CONTRAST MEDICATION: Performed by: RADIOLOGY

## 2019-12-29 PROCEDURE — 82962 GLUCOSE BLOOD TEST: CPT

## 2019-12-29 RX ORDER — HYDROCORTISONE 20 MG/1
20 TABLET ORAL DAILY
Status: DISCONTINUED | OUTPATIENT
Start: 2019-12-29 | End: 2020-01-02 | Stop reason: HOSPADM

## 2019-12-29 RX ORDER — SODIUM POLYSTYRENE SULFONATE 15 G/60ML
15 SUSPENSION ORAL; RECTAL ONCE
Status: COMPLETED | OUTPATIENT
Start: 2019-12-29 | End: 2019-12-29

## 2019-12-29 RX ADMIN — GABAPENTIN 400 MG: 400 CAPSULE ORAL at 09:40

## 2019-12-29 RX ADMIN — SODIUM POLYSTYRENE SULFONATE 15 G: 15 SUSPENSION ORAL; RECTAL at 12:59

## 2019-12-29 RX ADMIN — SODIUM CHLORIDE, PRESERVATIVE FREE 10 ML: 5 INJECTION INTRAVENOUS at 09:41

## 2019-12-29 RX ADMIN — IOPAMIDOL 60 ML: 755 INJECTION, SOLUTION INTRAVENOUS at 14:50

## 2019-12-29 RX ADMIN — SODIUM CHLORIDE, PRESERVATIVE FREE 10 ML: 5 INJECTION INTRAVENOUS at 21:36

## 2019-12-29 RX ADMIN — INSULIN LISPRO 3 UNITS: 100 INJECTION, SOLUTION INTRAVENOUS; SUBCUTANEOUS at 17:21

## 2019-12-29 RX ADMIN — GABAPENTIN 400 MG: 400 CAPSULE ORAL at 21:33

## 2019-12-29 RX ADMIN — CLOPIDOGREL 75 MG: 75 TABLET, FILM COATED ORAL at 09:41

## 2019-12-29 RX ADMIN — OXYCODONE AND ACETAMINOPHEN 1 TABLET: 5; 325 TABLET ORAL at 01:52

## 2019-12-29 RX ADMIN — COSYNTROPIN 250 MCG: 0.25 INJECTION, POWDER, LYOPHILIZED, FOR SOLUTION INTRAMUSCULAR; INTRAVENOUS at 09:44

## 2019-12-29 RX ADMIN — SENNOSIDES 8.6 MG: 8.6 TABLET, FILM COATED ORAL at 09:41

## 2019-12-29 RX ADMIN — INSULIN GLARGINE 65 UNITS: 100 INJECTION, SOLUTION SUBCUTANEOUS at 21:24

## 2019-12-29 RX ADMIN — SERTRALINE 100 MG: 100 TABLET, FILM COATED ORAL at 09:41

## 2019-12-29 RX ADMIN — PANTOPRAZOLE SODIUM 40 MG: 40 TABLET, DELAYED RELEASE ORAL at 06:32

## 2019-12-29 RX ADMIN — ENOXAPARIN SODIUM 40 MG: 40 INJECTION SUBCUTANEOUS at 09:41

## 2019-12-29 RX ADMIN — HYDROCORTISONE 20 MG: 20 TABLET ORAL at 12:56

## 2019-12-29 RX ADMIN — ATORVASTATIN CALCIUM 40 MG: 40 TABLET, FILM COATED ORAL at 09:41

## 2019-12-29 RX ADMIN — DOCUSATE SODIUM 100 MG: 100 CAPSULE, LIQUID FILLED ORAL at 09:41

## 2019-12-29 RX ADMIN — OXYCODONE AND ACETAMINOPHEN 1 TABLET: 5; 325 TABLET ORAL at 17:35

## 2019-12-29 RX ADMIN — INSULIN LISPRO 18 UNITS: 100 INJECTION, SOLUTION INTRAVENOUS; SUBCUTANEOUS at 17:19

## 2019-12-29 RX ADMIN — INSULIN LISPRO 4 UNITS: 100 INJECTION, SOLUTION INTRAVENOUS; SUBCUTANEOUS at 06:33

## 2019-12-29 RX ADMIN — INSULIN LISPRO 2 UNITS: 100 INJECTION, SOLUTION INTRAVENOUS; SUBCUTANEOUS at 21:26

## 2019-12-29 RX ADMIN — FOLIC ACID 1 MG: 1 TABLET ORAL at 09:41

## 2019-12-29 RX ADMIN — RANOLAZINE 1000 MG: 500 TABLET, FILM COATED, EXTENDED RELEASE ORAL at 21:33

## 2019-12-29 RX ADMIN — ASPIRIN 81 MG: 81 TABLET, COATED ORAL at 09:41

## 2019-12-29 RX ADMIN — DEXAMETHASONE 0.75 MG: 0.75 TABLET ORAL at 16:02

## 2019-12-29 RX ADMIN — SENNOSIDES 8.6 MG: 8.6 TABLET, FILM COATED ORAL at 21:33

## 2019-12-29 RX ADMIN — INSULIN LISPRO 18 UNITS: 100 INJECTION, SOLUTION INTRAVENOUS; SUBCUTANEOUS at 06:32

## 2019-12-29 RX ADMIN — GABAPENTIN 400 MG: 400 CAPSULE ORAL at 16:01

## 2019-12-29 RX ADMIN — RANOLAZINE 1000 MG: 500 TABLET, FILM COATED, EXTENDED RELEASE ORAL at 09:41

## 2019-12-29 ASSESSMENT — PAIN DESCRIPTION - ORIENTATION: ORIENTATION: LEFT;RIGHT

## 2019-12-29 ASSESSMENT — PAIN SCALES - GENERAL
PAINLEVEL_OUTOF10: 0
PAINLEVEL_OUTOF10: 0
PAINLEVEL_OUTOF10: 6
PAINLEVEL_OUTOF10: 8
PAINLEVEL_OUTOF10: 8

## 2019-12-29 ASSESSMENT — PAIN DESCRIPTION - PAIN TYPE: TYPE: CHRONIC PAIN

## 2019-12-29 ASSESSMENT — PAIN DESCRIPTION - LOCATION: LOCATION: LEG

## 2019-12-29 ASSESSMENT — PAIN DESCRIPTION - DESCRIPTORS: DESCRIPTORS: ACHING

## 2019-12-29 NOTE — PROGRESS NOTES
[facility]  Internal Medicine Residency Program  Progress Note - House Team 1    Patient:  Guerline Brandon 62 y.o. female MRN: [id number]     Date of Service: 12/29/2019     CC: sob and fatigue    Subjective     Patient examined at bedside,complains of constipation, and distention of abdomen, feels better after the contreras, she denied any chest pain, dizziness , nausea or vomiting but still complains of constipation    Objective     Physical Exam:  · Vitals: /69   Pulse 93   Temp 97.7 °F (36.5 °C) (Temporal)   Resp 20   Ht 5' (1.524 m)   Wt 228 lb 6.4 oz (103.6 kg)   SpO2 96%   BMI 44.61 kg/m²     · I & O - 24hr: No intake/output data recorded. · General Appearance: alert, appears stated age and cooperative  · HEENT:  Head: Normal, normocephalic, atraumatic. · Neck: supple, symmetrical, trachea midline  · Lung: clear to auscultation bilaterally  · Heart: regular rate and rhythm, S1, S2 normal, no murmur, click, rub or gallop  · Abdomen: soft, non-tender; bowel sounds normal; no masses,  no organomegaly  · Extremities:  R BKA, L AKA  · Musculokeletal: R BKA and L aka.    · Neurologic: Mental status: Alert, oriented, thought content appropriate  Subject  Pertinent Labs & Imaging Studies   heather  CBC with Differential:    Lab Results   Component Value Date    WBC 7.5 12/29/2019    RBC 3.20 12/29/2019    HGB 9.6 12/29/2019    HCT 33.9 12/29/2019     12/29/2019    .9 12/29/2019    MCH 30.0 12/29/2019    MCHC 28.3 12/29/2019    RDW 15.5 12/29/2019    NRBC 1 09/21/2012    SEGSPCT 50 03/21/2014    LYMPHOPCT 12.4 12/23/2019    MONOPCT 5.3 12/23/2019    BASOPCT 0.7 12/23/2019    MONOSABS 0.44 12/23/2019    LYMPHSABS 1.07 12/23/2019    EOSABS 0.24 12/23/2019    BASOSABS 0.00 12/23/2019     BMP:    Lab Results   Component Value Date     12/29/2019    K 5.6 12/29/2019    K 4.8 12/28/2019     12/29/2019    CO2 23 12/29/2019    BUN 26 12/29/2019    LABALBU 3.9 12/29/2019 12/18 delivered to bedside: \"The following medications were delivered to the patient:  · Metoprolol succinate er 25  · Folic acid 1  · Bumetanide 1  Isosorbide monoitrate er 30\"-- will hold    90s BP on admission     Hold nitro and diuretics--   Also recently started on metoprolol and imdur  Went home 5 days then returned on 12/23:     Echo NO change --to heart function--but actually improved  -- also IV fluids NOT given  MATTHEW -- inc to Cr 1.7-- now 1.1 much better sp IV fluids-- held bumex- hold one more day--if resume bumex use 1/2 dose or give lasix    Plan CTA today-  BNP 7000 dropped 3000 now-- clinicially NOT much pulm edema- better  Anemia- hgb 9.7 on DC-- macrocytic anemia -- will workup and this seems sABLE   dm2-- 65 lantus qhs +18 tid short acting, will keep same  >50% of time spent coordinating care with other providers and/or counseling patient/family  Remainder of medical problems as per resident note    Repeat echo:  estimated at 50%.   No regional wall motion abnormalities seen.   There is doppler evidence of stage II diastolic dysfunction.   Normal right ventricle structure and function.

## 2019-12-29 NOTE — PROGRESS NOTES
Department of Internal Medicine  Nephrology Attending Progress Note    Subjective:  Patient feels much better today.     Current Medications:    Current Facility-Administered Medications: gabapentin (NEURONTIN) capsule 400 mg, 400 mg, Oral, TID  senna (SENOKOT) tablet 8.6 mg, 1 tablet, Oral, BID  [Held by provider] lisinopril (PRINIVIL;ZESTRIL) tablet 5 mg, 5 mg, Oral, Daily  [Held by provider] bumetanide (BUMEX) tablet 1 mg, 1 mg, Oral, Daily  insulin lispro (HUMALOG) injection vial 0-6 Units, 0-6 Units, Subcutaneous, TID WC  insulin lispro (HUMALOG) injection vial 0-3 Units, 0-3 Units, Subcutaneous, Nightly  oxyCODONE-acetaminophen (PERCOCET) 5-325 MG per tablet 1 tablet, 1 tablet, Oral, Q6H PRN  pantoprazole (PROTONIX) tablet 40 mg, 40 mg, Oral, QAM AC  ranolazine (RANEXA) extended release tablet 1,000 mg, 1,000 mg, Oral, BID  folic acid (FOLVITE) tablet 1 mg, 1 mg, Oral, Daily  aspirin EC tablet 81 mg, 81 mg, Oral, Daily  atorvastatin (LIPITOR) tablet 40 mg, 40 mg, Oral, Daily  docusate sodium (COLACE) capsule 100 mg, 100 mg, Oral, Daily PRN  insulin glargine (LANTUS) injection vial 65 Units, 65 Units, Subcutaneous, Nightly  insulin lispro (HUMALOG) injection vial 18 Units, 18 Units, Subcutaneous, TID AC  sertraline (ZOLOFT) tablet 100 mg, 100 mg, Oral, Daily  clopidogrel (PLAVIX) tablet 75 mg, 75 mg, Oral, Daily  glucose (GLUTOSE) 40 % oral gel 15 g, 15 g, Oral, PRN  dextrose 50 % IV solution, 12.5 g, Intravenous, PRN  glucagon (rDNA) injection 1 mg, 1 mg, Intramuscular, PRN  dextrose 5 % solution, 100 mL/hr, Intravenous, PRN  [Held by provider] isosorbide mononitrate (IMDUR) extended release tablet 30 mg, 30 mg, Oral, Daily  sodium chloride flush 0.9 % injection 10 mL, 10 mL, Intravenous, 2 times per day  sodium chloride flush 0.9 % injection 10 mL, 10 mL, Intravenous, PRN  magnesium hydroxide (MILK OF MAGNESIA) 400 MG/5ML suspension 30 mL, 30 mL, Oral, Daily PRN  ondansetron (ZOFRAN) injection 4 mg, 4 mg, left kidneys which   may be secondary to the large volume within an extensively dilated   urinary bladder. 2. No additional sonographic abnormalities of the right and left   kidneys. IMPRESSION/RECOMMENDATIONS:      Briefly Mrs. Bia Spaulding is a 26-year-old female with history of HTN, type II DM, hyperlipidemia, CAD, HFrEF 38%, PVD status post right BKA and left AKA, who was admitted on December 13, 2019 with altered mental status. Upon admission her creatinine level was 1.2 mg/dL and had increased to 1.7 mg/dL reason for this consultation. had an episode of low blood pressure yesterday. Reports no nausea vomiting or diarrhea. Reports some shortness of breath. 1. MATTHEW stage I, probably hemodynamically mediated MATTHEW due hypotension/ischemic insult- renal function back to baseline today. 2. Possible UTI  3. Status post hypotension/history of adrenal insufficiency- agree with dexamethasone and check cosyntropin stimulation test- low am cortisol level noted  4. HFrEF 38%, compensated  5. Anemia, macrocytic; with normal B12 and folate levels  6. Hyperkalemia - multifactorial/prerenal and adrenal insuffciency     Plan:    - agree with holding off ace inhibitors and diuretics  - stop normal saline   - urine studies noted  - keep MAP >70 mm hg  - avoid nephrotoxic drugs  - monitor renal function daily  - start cortef 20 mg daily  - kayexalate 15 gm one dose today  - check bmp at 4 pm    Jhonny Chatterjee MD    Thank you for this consultation. .. Ezekiel Deem Ezekiel Deem Ezekiel Deem

## 2019-12-30 LAB
ALBUMIN SERPL-MCNC: 3.7 G/DL (ref 3.5–5.2)
ALP BLD-CCNC: 70 U/L (ref 35–104)
ALT SERPL-CCNC: 16 U/L (ref 0–32)
ANION GAP SERPL CALCULATED.3IONS-SCNC: 13 MMOL/L (ref 7–16)
AST SERPL-CCNC: 10 U/L (ref 0–31)
BILIRUB SERPL-MCNC: 0.5 MG/DL (ref 0–1.2)
BUN BLDV-MCNC: 21 MG/DL (ref 8–23)
CALCIUM SERPL-MCNC: 8.8 MG/DL (ref 8.6–10.2)
CHLORIDE BLD-SCNC: 104 MMOL/L (ref 98–107)
CO2: 24 MMOL/L (ref 22–29)
CREAT SERPL-MCNC: 1 MG/DL (ref 0.5–1)
GFR AFRICAN AMERICAN: >60
GFR NON-AFRICAN AMERICAN: 56 ML/MIN/1.73
GLUCOSE BLD-MCNC: 385 MG/DL (ref 74–99)
METER GLUCOSE: 248 MG/DL (ref 74–99)
METER GLUCOSE: 282 MG/DL (ref 74–99)
METER GLUCOSE: 292 MG/DL (ref 74–99)
METER GLUCOSE: 342 MG/DL (ref 74–99)
POTASSIUM SERPL-SCNC: 4.5 MMOL/L (ref 3.5–5)
SODIUM BLD-SCNC: 141 MMOL/L (ref 132–146)
TOTAL PROTEIN: 6.5 G/DL (ref 6.4–8.3)

## 2019-12-30 PROCEDURE — 2580000003 HC RX 258: Performed by: INTERNAL MEDICINE

## 2019-12-30 PROCEDURE — 6370000000 HC RX 637 (ALT 250 FOR IP): Performed by: INTERNAL MEDICINE

## 2019-12-30 PROCEDURE — 82962 GLUCOSE BLOOD TEST: CPT

## 2019-12-30 PROCEDURE — 80053 COMPREHEN METABOLIC PANEL: CPT

## 2019-12-30 PROCEDURE — 36415 COLL VENOUS BLD VENIPUNCTURE: CPT

## 2019-12-30 PROCEDURE — 99233 SBSQ HOSP IP/OBS HIGH 50: CPT | Performed by: INTERNAL MEDICINE

## 2019-12-30 PROCEDURE — 99231 SBSQ HOSP IP/OBS SF/LOW 25: CPT | Performed by: INTERNAL MEDICINE

## 2019-12-30 PROCEDURE — 6360000002 HC RX W HCPCS: Performed by: INTERNAL MEDICINE

## 2019-12-30 PROCEDURE — 99255 IP/OBS CONSLTJ NEW/EST HI 80: CPT | Performed by: INTERNAL MEDICINE

## 2019-12-30 PROCEDURE — APPSS60 APP SPLIT SHARED TIME 46-60 MINUTES: Performed by: NURSE PRACTITIONER

## 2019-12-30 PROCEDURE — 2140000000 HC CCU INTERMEDIATE R&B

## 2019-12-30 PROCEDURE — 2700000000 HC OXYGEN THERAPY PER DAY

## 2019-12-30 RX ORDER — METOPROLOL SUCCINATE 100 MG/1
100 TABLET, EXTENDED RELEASE ORAL DAILY
Status: DISCONTINUED | OUTPATIENT
Start: 2019-12-30 | End: 2020-01-02 | Stop reason: HOSPADM

## 2019-12-30 RX ORDER — BUMETANIDE 1 MG/1
1 TABLET ORAL 2 TIMES DAILY
Status: DISCONTINUED | OUTPATIENT
Start: 2019-12-30 | End: 2019-12-31

## 2019-12-30 RX ORDER — FLUCONAZOLE 100 MG/1
150 TABLET ORAL DAILY
Status: DISCONTINUED | OUTPATIENT
Start: 2019-12-30 | End: 2020-01-01

## 2019-12-30 RX ORDER — BLOOD SUGAR DIAGNOSTIC
STRIP MISCELLANEOUS
Qty: 100 EACH | Refills: 2 | Status: SHIPPED
Start: 2019-12-30 | End: 2020-03-30

## 2019-12-30 RX ADMIN — INSULIN GLARGINE 65 UNITS: 100 INJECTION, SOLUTION SUBCUTANEOUS at 20:22

## 2019-12-30 RX ADMIN — SERTRALINE 100 MG: 100 TABLET, FILM COATED ORAL at 09:41

## 2019-12-30 RX ADMIN — ENOXAPARIN SODIUM 40 MG: 40 INJECTION SUBCUTANEOUS at 09:40

## 2019-12-30 RX ADMIN — ASPIRIN 81 MG: 81 TABLET, COATED ORAL at 09:41

## 2019-12-30 RX ADMIN — ATORVASTATIN CALCIUM 40 MG: 40 TABLET, FILM COATED ORAL at 09:42

## 2019-12-30 RX ADMIN — SENNOSIDES 8.6 MG: 8.6 TABLET, FILM COATED ORAL at 09:42

## 2019-12-30 RX ADMIN — OXYCODONE AND ACETAMINOPHEN 1 TABLET: 5; 325 TABLET ORAL at 13:45

## 2019-12-30 RX ADMIN — METOPROLOL SUCCINATE 100 MG: 100 TABLET, EXTENDED RELEASE ORAL at 15:26

## 2019-12-30 RX ADMIN — INSULIN LISPRO 4 UNITS: 100 INJECTION, SOLUTION INTRAVENOUS; SUBCUTANEOUS at 06:35

## 2019-12-30 RX ADMIN — GABAPENTIN 400 MG: 400 CAPSULE ORAL at 20:22

## 2019-12-30 RX ADMIN — HYDROCORTISONE 20 MG: 20 TABLET ORAL at 09:41

## 2019-12-30 RX ADMIN — BUMETANIDE 1 MG: 1 TABLET ORAL at 21:30

## 2019-12-30 RX ADMIN — INSULIN LISPRO 6 UNITS: 100 INJECTION, SOLUTION INTRAVENOUS; SUBCUTANEOUS at 12:01

## 2019-12-30 RX ADMIN — FOLIC ACID 1 MG: 1 TABLET ORAL at 09:41

## 2019-12-30 RX ADMIN — SODIUM CHLORIDE, PRESERVATIVE FREE 10 ML: 5 INJECTION INTRAVENOUS at 20:24

## 2019-12-30 RX ADMIN — DEXAMETHASONE 0.75 MG: 0.75 TABLET ORAL at 06:45

## 2019-12-30 RX ADMIN — GABAPENTIN 400 MG: 400 CAPSULE ORAL at 09:42

## 2019-12-30 RX ADMIN — SODIUM CHLORIDE, PRESERVATIVE FREE 10 ML: 5 INJECTION INTRAVENOUS at 09:40

## 2019-12-30 RX ADMIN — INSULIN LISPRO 6 UNITS: 100 INJECTION, SOLUTION INTRAVENOUS; SUBCUTANEOUS at 16:46

## 2019-12-30 RX ADMIN — INSULIN LISPRO 18 UNITS: 100 INJECTION, SOLUTION INTRAVENOUS; SUBCUTANEOUS at 16:43

## 2019-12-30 RX ADMIN — OXYCODONE AND ACETAMINOPHEN 1 TABLET: 5; 325 TABLET ORAL at 00:03

## 2019-12-30 RX ADMIN — GABAPENTIN 400 MG: 400 CAPSULE ORAL at 15:26

## 2019-12-30 RX ADMIN — PANTOPRAZOLE SODIUM 40 MG: 40 TABLET, DELAYED RELEASE ORAL at 06:45

## 2019-12-30 RX ADMIN — INSULIN LISPRO 2 UNITS: 100 INJECTION, SOLUTION INTRAVENOUS; SUBCUTANEOUS at 20:23

## 2019-12-30 RX ADMIN — CLOPIDOGREL 75 MG: 75 TABLET, FILM COATED ORAL at 09:41

## 2019-12-30 RX ADMIN — RANOLAZINE 1000 MG: 500 TABLET, FILM COATED, EXTENDED RELEASE ORAL at 20:22

## 2019-12-30 RX ADMIN — FLUCONAZOLE 150 MG: 100 TABLET ORAL at 12:48

## 2019-12-30 RX ADMIN — INSULIN LISPRO 18 UNITS: 100 INJECTION, SOLUTION INTRAVENOUS; SUBCUTANEOUS at 06:39

## 2019-12-30 RX ADMIN — INSULIN LISPRO 18 UNITS: 100 INJECTION, SOLUTION INTRAVENOUS; SUBCUTANEOUS at 11:58

## 2019-12-30 RX ADMIN — SENNOSIDES 8.6 MG: 8.6 TABLET, FILM COATED ORAL at 20:22

## 2019-12-30 RX ADMIN — RANOLAZINE 1000 MG: 500 TABLET, FILM COATED, EXTENDED RELEASE ORAL at 09:41

## 2019-12-30 RX ADMIN — DOCUSATE SODIUM 100 MG: 100 CAPSULE, LIQUID FILLED ORAL at 09:42

## 2019-12-30 ASSESSMENT — PAIN SCALES - GENERAL
PAINLEVEL_OUTOF10: 8
PAINLEVEL_OUTOF10: 7
PAINLEVEL_OUTOF10: 3
PAINLEVEL_OUTOF10: 0
PAINLEVEL_OUTOF10: 4
PAINLEVEL_OUTOF10: 0

## 2019-12-30 NOTE — PROGRESS NOTES
Department of Internal Medicine  Nephrology Attending Progress Note    SUBJECTIVE: We are following Mrs. Marroquin for MATTHEW. Reports shortness of breath. PHYSICAL EXAM:      Vitals:    VITALS:  BP (!) 144/72   Pulse 91   Temp 97.1 °F (36.2 °C) (Temporal)   Resp 18   Ht 5' (1.524 m)   Wt 229 lb 11.2 oz (104.2 kg)   SpO2 99%   BMI 44.86 kg/m²   24HR RESPIRATORY RATE RANGE:  Resp  Avg: 15.5  Min: 14  Max: 18  24HR BLOOD PRESSURE RANGE:  Systolic (58GDY), HMR:312 , Min:125 , TRJ:784   ; Diastolic (27BHH), DTV:71, Min:60, Max:72    24HR INTAKE/OUTPUT:      Intake/Output Summary (Last 24 hours) at 12/30/2019 1137  Last data filed at 12/30/2019 0651  Gross per 24 hour   Intake 1880 ml   Output 1350 ml   Net 530 ml     8HR INTAKE OUTPUT:  No intake/output data recorded.      Constitutional: Patient is awake alert in no distress  HEENT: Pupils are equal reactive  Respiratory: Decreased breath sounds at the bases  Cardiovascular/Edema: Heart sounds are regular rate  Gastrointestinal: Abdomen soft, nontender  Neurologic: Nonfocal  Skin: No lesions  Other: +trace Edema , right BKA and left AKA    Scheduled Meds:   insulin lispro  0-12 Units Subcutaneous TID WC    insulin lispro  0-6 Units Subcutaneous Nightly    fluconazole  150 mg Oral Daily    metoprolol succinate  100 mg Oral Daily    hydrocortisone  20 mg Oral Daily    gabapentin  400 mg Oral TID    senna  1 tablet Oral BID    lisinopril  5 mg Oral Daily    bumetanide  1 mg Oral Daily    pantoprazole  40 mg Oral QAM AC    ranolazine  1,000 mg Oral BID    folic acid  1 mg Oral Daily    aspirin  81 mg Oral Daily    atorvastatin  40 mg Oral Daily    insulin glargine  65 Units Subcutaneous Nightly    insulin lispro  18 Units Subcutaneous TID AC    sertraline  100 mg Oral Daily    clopidogrel  75 mg Oral Daily    isosorbide mononitrate  30 mg Oral Daily    sodium chloride flush  10 mL Intravenous 2 times per day    enoxaparin  40 mg Subcutaneous Daily Continuous Infusions:   dextrose       PRN Meds:.oxyCODONE-acetaminophen, docusate sodium, glucose, dextrose, glucagon (rDNA), dextrose, sodium chloride flush, magnesium hydroxide, ondansetron, acetaminophen    DATA:    CBC:   Lab Results   Component Value Date    WBC 7.5 12/29/2019    RBC 3.20 12/29/2019    HGB 9.6 12/29/2019    HCT 33.9 12/29/2019    .9 12/29/2019    MCH 30.0 12/29/2019    MCHC 28.3 12/29/2019    RDW 15.5 12/29/2019     12/29/2019    MPV 9.6 12/29/2019     CMP:    Lab Results   Component Value Date     12/30/2019    K 4.5 12/30/2019    K 4.8 12/28/2019     12/30/2019    CO2 24 12/30/2019    BUN 21 12/30/2019    CREATININE 1.0 12/30/2019    GFRAA >60 12/30/2019    LABGLOM 56 12/30/2019    GLUCOSE 385 12/30/2019    GLUCOSE 264 04/17/2012    PROT 6.5 12/30/2019    LABALBU 3.7 12/30/2019    LABALBU 4.3 04/16/2012    CALCIUM 8.8 12/30/2019    BILITOT 0.5 12/30/2019    ALKPHOS 70 12/30/2019    AST 10 12/30/2019    ALT 16 12/30/2019     Magnesium:    Lab Results   Component Value Date    MG 1.6 12/18/2019     Phosphorus:    Lab Results   Component Value Date    PHOS 4.2 12/18/2019     proBNP: 7840 ----3785    Radiology review:    CTA chest with IV contrast December 29, 2019   1. No evidence of acute pulmonary embolic disease. 2. Bilateral pleural effusions and suspected pulmonary edema   associated with cardiomegaly. CHF is suspected. 3. Mediastinal and right hilar adenopathy of indeterminate etiology. 4. Cholelithiasis. Kidney ultrasound December 28, 2019   Normal size kidneys. Discrete the pyelocaliectasis in the right kidney but no conspicuous   hydronephrosis.       No signs for hydronephrosis in the left kidney which was more   difficult to be evaluate. BRIEF SUMMARY OF INITIAL CONSULT:    Briefly Mrs. Rafael Perales is a 69-year-old female with history of HTN, type II DM, hyperlipidemia, CAD, HFrEF 38%, PVD status post right BKA and left AKA, who was

## 2019-12-30 NOTE — CONSULTS
Patient seen and examined. Chart, labs, imaging studies, EKG and rhythm strips reviewed. Full consult to follow. We were asked to see the patient HFiEF. IMPRESSION:  1. Ischemic CMP with mild LV dysfunction and chronic combined diastolic and systolic HF. Patient being evaluated at [facility] for possible CABG  2. Morbid Obesity  3. PAD s/p R BKA and L AKA  4. Insulin requiring DM  5. HTN  6. HLD  7. Anemia   8. RAÚL: resolved   9. BEATRICE: untreated   10. Former smoker  6. Depression     PLAN:   1. Resume BB, Nitrates, ACE-I and diuretics   2. Continue Ranexa, ASA, Plavix and statins   3. Monitor BMP  4. Consider referring patient back to [facility] as she is being evaluated for CABG. 5. Cardiology will sign off; please call if needed.      Electronically signed by Anna Marie Mohr MD on 12/30/2019 at 1:40 PM

## 2019-12-30 NOTE — PROGRESS NOTES
[address] [facility]  Internal Medicine Residency / [address]    Attending Physician Statement  I have discussed the case, including pertinent history and exam findings with the resident and the team.  I have seen and examined the patient and the key elements of the encounter have been performed by me. I agree with the assessment, plan and orders as documented by the resident. Alert and oriented   And Chest CT with right pleural effusion  H&L decreased BS at right base  Will have pulmonary see  LAbs and meds  reviewed   Plan; Discharge planning  Remainder of medical problems as per resident note.       Luis Eduardo Lacks  Internal Medicine Residency Faculty

## 2019-12-30 NOTE — PLAN OF CARE
As of past weekend    bc MATTHEW- with diuresis  --she is new to diuretics  matthew- secondary to over aggressive diuresiss and resulted in hypotension on admssion:  Etiology of new hypoxia? ?  New hypoxia despite \"relatively cleared pulm edema infiltrates\" compared to prior admission where she didn't require o2      Known CAD, low EF but Repeat echo:  \"Ejection fraction is visually estimated at 50%.   No regional wall motion abnormalities seen.   There is doppler evidence of stage II diastolic dysfunction.   Normal right ventricle structure and function.   Physiologic and/or trace mitral regurgitation is present. \"      Now on o2 but more comfortable despite giving IV fluids back to treat new MATTHEW--improved till aggressive diuresis resume  We have CTA ordered once renal function improved-- bc chest pain, new sob, and new drop in BP on admssion rule out PE  Result CTA:  \"No evidence of acute pulmonary embolic disease. . Bilateral pleural effusions and suspected pulmonary edema  associated with cardiomegaly. CHF is suspected\"    pulm edema difficult to treat bc low BP and MATTHEW  And REPEAT echo shows improved heart function  --normal valves, no tamponade etc.  EF normal.--      I am addending this note again-- as on 1/1/2020--      +++++++++++++++++++++++++++++++++++++++++    MATTHEW again!!!!!!     pulmonary stating EF low- but EF 50%   -- she was resumed home dose bid 2mg  bumex diuresis, I warned that attempts of aggressive diuresis would again cause- MATTHEW  Cr. 1.0 back up to 1.5-- after only 1.5Liters of Urine output over psat 24 hours. .. Young Car . be wary of aggressive diuresis with her.

## 2019-12-30 NOTE — PLAN OF CARE
Problem: Pain:  Goal: Pain level will decrease  Description  Pain level will decrease  Outcome: Met This Shift  Goal: Control of acute pain  Description  Control of acute pain  Outcome: Met This Shift  Goal: Control of chronic pain  Description  Control of chronic pain  Outcome: Met This Shift     Problem: Risk for Impaired Skin Integrity  Goal: Tissue integrity - skin and mucous membranes  Description  Structural intactness and normal physiological function of skin and  mucous membranes.   Outcome: Met This Shift     Problem: Falls - Risk of:  Goal: Will remain free from falls  Description  Will remain free from falls  Outcome: Met This Shift  Goal: Absence of physical injury  Description  Absence of physical injury  Outcome: Met This Shift     Problem: Musculor/Skeletal Functional Status  Goal: Absence of falls  Outcome: Met This Shift

## 2019-12-30 NOTE — PROGRESS NOTES
[facility]  Internal Medicine Residency Program  Progress Note - House Team 1    Patient:  Stacey Stephens 62 y.o. female MRN: [id number]     Date of Service: 12/30/2019     CC: Shortness of breath and fatigue  Overnight events: None    Subjective   Stacey Stephens is a 62 y.o. female on day 7 of hospital admission for shortness of breath    Patient was seen at bed side. They are awake alert and respond to questions appropriately. They have no new complaints overnight. She states they had a bowel movement yesterday    Objective     Physical Exam:  · Vitals: BP (!) 144/72   Pulse 91   Temp 97.1 °F (36.2 °C) (Temporal)   Resp 18   Ht 5' (1.524 m)   Wt 229 lb 11.2 oz (104.2 kg)   SpO2 99%   BMI 44.86 kg/m²       · Constitutional: Alert, AaO x3. Follows commands. In no apparent distress. · Head: Normocephalic and atraumatic. · Eyes: PERRL, conjunctiva normal, (-) scleral icterus. Mucus membranes moist.  · Mouth: Mucus membranes moist. Oropharynx clear. No deviation of the tongue or uvula. Normal dentition. · Neck: (-)  swelling, (-) JVD   · Respiratory: Lungs clear to auscultation bilaterally. (-)  wheezes, (-)  rales, (-)  rhonchi. · Cardiovascular: RRR. (-)  murmurs, (-) gallops,  (-) rubs. S1 and S2 were normal.   · GI:  Abdomen soft, non-tender, non-distended. (+) BS. (-)  rebound, (-) guarding, (-) rigidity. · Extremities: Right-sided below the knee amputation and left-sided above-the-knee amputation  · Neurologic:  Cranial nerves II-XII grossly intact. No focal neurological deficits.      Subject  Pertinent Labs & Imaging Studies   heather  CBC:   Lab Results   Component Value Date    WBC 7.5 12/29/2019    RBC 3.20 12/29/2019    HGB 9.6 12/29/2019    HCT 33.9 12/29/2019    .9 12/29/2019    MCH 30.0 12/29/2019    MCHC 28.3 12/29/2019    RDW 15.5 12/29/2019     12/29/2019    MPV 9.6 12/29/2019     BMP:    Lab Results   Component Value Date     12/30/2019    K 4.5 12/30/2019    K 4.8 12/28/2019     12/30/2019    CO2 24 12/30/2019    BUN 21 12/30/2019    LABALBU 3.7 12/30/2019    LABALBU 4.3 04/16/2012    CREATININE 1.0 12/30/2019    CALCIUM 8.8 12/30/2019    GFRAA >60 12/30/2019    LABGLOM 56 12/30/2019    GLUCOSE 385 12/30/2019    GLUCOSE 264 04/17/2012       Resident's Assessment and Plan     Jose Prince is a 62 y.o. female    1. Acute hypoxic respiratory failure 2/2 pleural effusion VS decompensated HFrEF   Currently on 1 L of oxygen   Continues to complain of S OB off oxygen   Saturates poorly off O2   Continue Lopressor    Continue holding lisinopril and Bumex   Elevated proBNP 7840   Echo 12/23/2019 EF 49% stage II diastolic dysfunction   CTA chest negative for PE: Showed bilateral pleural effusion   Pulmonology consulted  [facility] Cardiology consulted   Continue Plavix and aspirin  2. MATTHEW (resolved)   Last CR 1.0   Retroperitoneal US negative   We will continue to monitor renal function   3. Adrenal insufficiency   Random cortisol 1.3   Continue hydrocortisone 20 mg p.o. daily  4. Type 2 diabetes mellitus   Continue glargine 65 units nightly   Continue lispro 18 units pre-meal   Increased sliding scale to high-dose  5. Vaginitis   White purulent vaginal secretions   Gave 1 dose of Diflucan 150 mg oral  6. Constipation (resolved)  · Patient has had bowel movements   · we will continue to monitor   · continue senna  7.  Hx MDD  · Continue sertraline 100 mg    PT/OT evaluation: Not indicated  DVT prophylaxis/ GI prophylaxis: Lovenox/Protonix  Disposition: Continue current medical management    Denton Rosado MD, PGY-1  Attending physician: Dr. Agueda Laguna

## 2019-12-30 NOTE — CONSULTS
(before meals)   Yes Historical Provider, MD   nitroGLYCERIN (NITROSTAT) 0.4 MG SL tablet Place 0.4 mg under the tongue every 5 minutes as needed for Chest pain   Yes Historical Provider, MD   sertraline (ZOLOFT) 100 MG tablet Take 100 mg by mouth daily   Yes Historical Provider, MD   clopidogrel (PLAVIX) 75 MG tablet Take 75 mg by mouth daily   Yes Historical Provider, MD   lisinopril (PRINIVIL;ZESTRIL) 5 MG tablet Take 5 mg by mouth daily   Yes Historical Provider, MD   metoprolol (LOPRESSOR) 100 MG tablet Take 100 mg by mouth 2 times daily   Yes Historical Provider, MD   amLODIPine (NORVASC) 2.5 MG tablet Take 2.5 mg by mouth daily   Yes Historical Provider, MD   influenza virus trivalent vaccine (FLUZONE) injection Inject 0.5 mLs into the muscle once Given 11/2019   Yes Historical Provider, MD   acetaminophen (TYLENOL) 325 MG tablet Take 325 mg by mouth every 6 hours as needed for Pain   Yes Historical Provider, MD   isosorbide mononitrate (IMDUR) 30 MG extended release tablet Take 1 tablet by mouth daily 12/19/19  Yes Aye Doherty MD   bumetanide (BUMEX) 1 MG tablet Take 1 tablet by mouth daily 12/19/19  Yes Aye Doherty MD   folic acid (FOLVITE) 1 MG tablet Take 1 tablet by mouth daily 12/19/19  Yes Aye Doherty MD   ranolazine (RANEXA) 1000 MG extended release tablet Take 1 tablet by mouth 2 times daily 12/3/19  Yes Iveth Blake MD   pantoprazole (PROTONIX) 40 MG tablet Take 40 mg by mouth every morning (before breakfast)   Yes Historical Provider, MD   gabapentin (NEURONTIN) 600 MG tablet Take 1 tablet by mouth 3 times daily for 90 days. 11/11/19 2/9/20 Yes Sudhir Pacheco MD   HYDROcodone-acetaminophen (NORCO) 5-325 MG per tablet Take 1 tablet by mouth every 6 hours as needed for Pain for up to 30 days.  1/10/20 2/9/20 Yes Kami Valera MD   blood glucose test strips (ONE TOUCH ULTRA TEST) strip use TEST STRIP four times a day 12/30/19   Rupa Heredia,    Insulin Syringe-Needle U-100 (BD INSULIN Retroperitoneal: 12/28/2019  Normal size kidneys. Discrete the pyelocaliectasis in the right kidney but no conspicuous   hydronephrosis.       No signs for hydronephrosis in the left kidney which was more   difficult to be evaluate. CXR: 12/28/2019  Prominence of the interstitium. No significant interval change      Assessment/plan as per Dr. Donald Mendosa. Electronically signed by AMINA Geiger CNP on 12/30/19 at 2:48 PM                    I personally and independently saw and examined patient and reviewed all done pertinent laboratory data, imaging studies, ECGs and rhythm strips. I have reviewed and agree with the APN history and physical exam as documented in the above note. Electronically signed by Pita Clay MD on 12/31/2019 at 7:40 AM  We were asked to see the patient HFiEF.     IMPRESSION:  1. Ischemic CMP with mild LV dysfunction and chronic combined diastolic and systolic HF. Patient being evaluated at [facility] for possible CABG  2. Morbid Obesity  3. PAD s/p R BKA and L AKA  4. Insulin requiring DM  5. HTN  6. HLD  7. Anemia   8. RAÚL: resolved   9. BEATRICE: untreated   10. Former smoker  6. Depression      PLAN:   1. Resume BB, Nitrates, ACE-I and diuretics   2. Continue Ranexa, ASA, Plavix and statins   3. Monitor BMP  4. Consider referring patient back to [facility] as she is being evaluated for CABG.    5. Cardiology will sign off; please call if needed.      Electronically signed by Pita Clay MD on 12/30/2019 at 1:40 PM

## 2019-12-31 LAB
ANION GAP SERPL CALCULATED.3IONS-SCNC: 13 MMOL/L (ref 7–16)
BUN BLDV-MCNC: 25 MG/DL (ref 8–23)
CALCIUM SERPL-MCNC: 9.3 MG/DL (ref 8.6–10.2)
CHLORIDE BLD-SCNC: 102 MMOL/L (ref 98–107)
CO2: 26 MMOL/L (ref 22–29)
CREAT SERPL-MCNC: 1.2 MG/DL (ref 0.5–1)
GFR AFRICAN AMERICAN: 55
GFR NON-AFRICAN AMERICAN: 46 ML/MIN/1.73
GLUCOSE BLD-MCNC: 159 MG/DL (ref 74–99)
HCT VFR BLD CALC: 35.5 % (ref 34–48)
HEMOGLOBIN: 9.9 G/DL (ref 11.5–15.5)
MCH RBC QN AUTO: 30 PG (ref 26–35)
MCHC RBC AUTO-ENTMCNC: 27.9 % (ref 32–34.5)
MCV RBC AUTO: 107.6 FL (ref 80–99.9)
METER GLUCOSE: 109 MG/DL (ref 74–99)
METER GLUCOSE: 154 MG/DL (ref 74–99)
METER GLUCOSE: 222 MG/DL (ref 74–99)
METER GLUCOSE: 310 MG/DL (ref 74–99)
PDW BLD-RTO: 15.9 FL (ref 11.5–15)
PLATELET # BLD: 361 E9/L (ref 130–450)
PMV BLD AUTO: 9.5 FL (ref 7–12)
POTASSIUM SERPL-SCNC: 5 MMOL/L (ref 3.5–5)
PRO-BNP: 9530 PG/ML (ref 0–125)
RBC # BLD: 3.3 E12/L (ref 3.5–5.5)
SODIUM BLD-SCNC: 141 MMOL/L (ref 132–146)
WBC # BLD: 10.5 E9/L (ref 4.5–11.5)

## 2019-12-31 PROCEDURE — 6370000000 HC RX 637 (ALT 250 FOR IP): Performed by: INTERNAL MEDICINE

## 2019-12-31 PROCEDURE — 2580000003 HC RX 258: Performed by: INTERNAL MEDICINE

## 2019-12-31 PROCEDURE — 2140000000 HC CCU INTERMEDIATE R&B

## 2019-12-31 PROCEDURE — 83880 ASSAY OF NATRIURETIC PEPTIDE: CPT

## 2019-12-31 PROCEDURE — 85027 COMPLETE CBC AUTOMATED: CPT

## 2019-12-31 PROCEDURE — 99233 SBSQ HOSP IP/OBS HIGH 50: CPT | Performed by: INTERNAL MEDICINE

## 2019-12-31 PROCEDURE — 97530 THERAPEUTIC ACTIVITIES: CPT

## 2019-12-31 PROCEDURE — 2700000000 HC OXYGEN THERAPY PER DAY

## 2019-12-31 PROCEDURE — 99231 SBSQ HOSP IP/OBS SF/LOW 25: CPT | Performed by: INTERNAL MEDICINE

## 2019-12-31 PROCEDURE — 36415 COLL VENOUS BLD VENIPUNCTURE: CPT

## 2019-12-31 PROCEDURE — 82962 GLUCOSE BLOOD TEST: CPT

## 2019-12-31 PROCEDURE — 6360000002 HC RX W HCPCS: Performed by: INTERNAL MEDICINE

## 2019-12-31 PROCEDURE — 80048 BASIC METABOLIC PNL TOTAL CA: CPT

## 2019-12-31 RX ORDER — BUMETANIDE 1 MG/1
2 TABLET ORAL 2 TIMES DAILY
Status: DISCONTINUED | OUTPATIENT
Start: 2019-12-31 | End: 2020-01-02

## 2019-12-31 RX ORDER — DIAPER,BRIEF,INFANT-TODD,DISP
EACH MISCELLANEOUS 2 TIMES DAILY
Status: DISCONTINUED | OUTPATIENT
Start: 2020-01-01 | End: 2020-01-01

## 2019-12-31 RX ADMIN — PANTOPRAZOLE SODIUM 40 MG: 40 TABLET, DELAYED RELEASE ORAL at 06:23

## 2019-12-31 RX ADMIN — HYDROCORTISONE 20 MG: 20 TABLET ORAL at 09:41

## 2019-12-31 RX ADMIN — INSULIN LISPRO 6 UNITS: 100 INJECTION, SOLUTION INTRAVENOUS; SUBCUTANEOUS at 23:30

## 2019-12-31 RX ADMIN — GABAPENTIN 400 MG: 400 CAPSULE ORAL at 14:52

## 2019-12-31 RX ADMIN — INSULIN LISPRO 18 UNITS: 100 INJECTION, SOLUTION INTRAVENOUS; SUBCUTANEOUS at 19:48

## 2019-12-31 RX ADMIN — ATORVASTATIN CALCIUM 40 MG: 40 TABLET, FILM COATED ORAL at 09:47

## 2019-12-31 RX ADMIN — BUMETANIDE 1 MG: 1 TABLET ORAL at 09:41

## 2019-12-31 RX ADMIN — INSULIN GLARGINE 65 UNITS: 100 INJECTION, SOLUTION SUBCUTANEOUS at 23:30

## 2019-12-31 RX ADMIN — LISINOPRIL 5 MG: 5 TABLET ORAL at 09:40

## 2019-12-31 RX ADMIN — ENOXAPARIN SODIUM 40 MG: 40 INJECTION SUBCUTANEOUS at 09:41

## 2019-12-31 RX ADMIN — INSULIN LISPRO 18 UNITS: 100 INJECTION, SOLUTION INTRAVENOUS; SUBCUTANEOUS at 06:23

## 2019-12-31 RX ADMIN — INSULIN LISPRO 3 UNITS: 100 INJECTION, SOLUTION INTRAVENOUS; SUBCUTANEOUS at 07:00

## 2019-12-31 RX ADMIN — GABAPENTIN 400 MG: 400 CAPSULE ORAL at 09:44

## 2019-12-31 RX ADMIN — GABAPENTIN 400 MG: 400 CAPSULE ORAL at 23:21

## 2019-12-31 RX ADMIN — SENNOSIDES 8.6 MG: 8.6 TABLET, FILM COATED ORAL at 09:45

## 2019-12-31 RX ADMIN — FLUCONAZOLE 150 MG: 100 TABLET ORAL at 09:40

## 2019-12-31 RX ADMIN — METOPROLOL SUCCINATE 100 MG: 100 TABLET, EXTENDED RELEASE ORAL at 09:43

## 2019-12-31 RX ADMIN — SERTRALINE 100 MG: 100 TABLET, FILM COATED ORAL at 09:40

## 2019-12-31 RX ADMIN — ASPIRIN 81 MG: 81 TABLET, COATED ORAL at 09:40

## 2019-12-31 RX ADMIN — SENNOSIDES 8.6 MG: 8.6 TABLET, FILM COATED ORAL at 23:21

## 2019-12-31 RX ADMIN — ISOSORBIDE MONONITRATE 30 MG: 30 TABLET ORAL at 09:40

## 2019-12-31 RX ADMIN — RANOLAZINE 1000 MG: 500 TABLET, FILM COATED, EXTENDED RELEASE ORAL at 09:40

## 2019-12-31 RX ADMIN — SODIUM CHLORIDE, PRESERVATIVE FREE 10 ML: 5 INJECTION INTRAVENOUS at 09:46

## 2019-12-31 RX ADMIN — FOLIC ACID 1 MG: 1 TABLET ORAL at 09:47

## 2019-12-31 RX ADMIN — CLOPIDOGREL 75 MG: 75 TABLET, FILM COATED ORAL at 09:40

## 2019-12-31 RX ADMIN — OXYCODONE AND ACETAMINOPHEN 1 TABLET: 5; 325 TABLET ORAL at 23:21

## 2019-12-31 RX ADMIN — BUMETANIDE 2 MG: 1 TABLET ORAL at 23:19

## 2019-12-31 RX ADMIN — RANOLAZINE 1000 MG: 500 TABLET, FILM COATED, EXTENDED RELEASE ORAL at 23:19

## 2019-12-31 ASSESSMENT — PAIN SCALES - GENERAL
PAINLEVEL_OUTOF10: 0
PAINLEVEL_OUTOF10: 7
PAINLEVEL_OUTOF10: 0

## 2019-12-31 NOTE — PROGRESS NOTES
Department of Internal Medicine  Nephrology Attending Progress Note    SUBJECTIVE: We are following Mrs. Marroquin for MATTHEW. Reports shortness of breath. PHYSICAL EXAM:      Vitals:    VITALS:  /77   Pulse 74   Temp 97.1 °F (36.2 °C) (Temporal)   Resp 20   Ht 5' (1.524 m)   Wt 225 lb (102.1 kg)   SpO2 94%   BMI 43.94 kg/m²   24HR RESPIRATORY RATE RANGE:  Resp  Av.3  Min: 18  Max: 20  24HR BLOOD PRESSURE RANGE:  Systolic (55RND), RYD:308 , Min:122 , AMD:672   ; Diastolic (56DJO), HSJ:10, Min:61, Max:77    24HR INTAKE/OUTPUT:      Intake/Output Summary (Last 24 hours) at 2019 0943  Last data filed at 2019 1425  Gross per 24 hour   Intake --   Output 550 ml   Net -550 ml     8HR INTAKE OUTPUT:  No intake/output data recorded.      Constitutional: Patient is awake alert in no distress  HEENT: Pupils are equal reactive  Respiratory: Decreased breath sounds at the bases  Cardiovascular/Edema: Heart sounds are regular rate  Gastrointestinal: Abdomen soft, nontender  Neurologic: Nonfocal  Skin: No lesions  Other: +trace Edema , right BKA and left AKA    Scheduled Meds:   insulin lispro  0-18 Units Subcutaneous TID WC    insulin lispro  0-9 Units Subcutaneous Nightly    fluconazole  150 mg Oral Daily    metoprolol succinate  100 mg Oral Daily    bumetanide  1 mg Oral BID    hydrocortisone  20 mg Oral Daily    gabapentin  400 mg Oral TID    senna  1 tablet Oral BID    lisinopril  5 mg Oral Daily    pantoprazole  40 mg Oral QAM AC    ranolazine  1,000 mg Oral BID    folic acid  1 mg Oral Daily    aspirin  81 mg Oral Daily    atorvastatin  40 mg Oral Daily    insulin glargine  65 Units Subcutaneous Nightly    insulin lispro  18 Units Subcutaneous TID AC    sertraline  100 mg Oral Daily    clopidogrel  75 mg Oral Daily    isosorbide mononitrate  30 mg Oral Daily    sodium chloride flush  10 mL Intravenous 2 times per day    enoxaparin  40 mg Subcutaneous Daily     Continuous

## 2019-12-31 NOTE — ADT AUTH CERT
Heart Failure - Care Day 9 (12/31/2019) by Columba Puga RN         Review Status Review Entered   Completed 12/31/2019 12:29       Criteria Review      Care Day: 9 Care Date: 12/31/2019 Level of Care: Intermediate Care    Guideline Day 3    Clinical Status    (X) * Hemodynamic stability    12/31/2019 12:29 PM EST by Betzaida Moseley      97.1 74 20 122/77 94% O2 3l/m/nc    (X) * Tachypnea absent    ( ) * Oxygenation at baseline or acceptable for next level of care    (X) * Dyspnea at baseline or acceptable for next level of care    (X) * Cardiac rate and rhythm acceptable    ( ) * Pulmonary edema absent or acceptable for next level of care    ( ) * Peripheral or sacral edema absent, at baseline, or improved    ( ) * Mental status at baseline    ( ) * Volume status acceptable on oral medication    ( ) * Renal function at baseline or acceptable for next level of care    ( ) * Electrolyte levels normal or acceptable for next level of care    ( ) * Immediate precipitating factors absent or controlled    ( ) * Discharge plans and education understood    Activity    ( ) * Ambulatory    Routes    ( ) * Oral hydration, medications, and diet    Interventions    ( ) * Oxygen absent    (X) Weigh    12/31/2019 12:29 PM EST by Betzaida Moseley      225 lb    Medications    (X) Diuretics    12/31/2019 12:29 PM EST by Betzaida Moseley      bumetanide (BUMEX) tablet 1 mg   Dose: 1 mg  Freq: 2 TIMES DAILY Route: PO    * Milestone   Additional Notes   12/31/2019      Geriatric Note:          Resting well   VS stable   Back on Lisinopril 5 mg and MEtoprolol  mg a day   And Bumex 2 mg bid   Pleural effusion same    Plan: Discharge planning      Nephrology Note:      SUBJECTIVE: We are following Mrs. Marroquin for MATTHEW.  Reports shortness of breath.       PHYSICAL EXAM:         Vitals:     VITALS:  /77   Pulse 74   Temp 97.1 °F (36.2 °C) (Temporal)   Resp 20   Ht 5' (1.524 m)   Wt 225 lb (102.1 kg)   SpO2 94%   BMI 43.94 kg/m²    24HR RESPIRATORY RATE RANGE:  Resp  Av.3  Min: 18  Max: 20   24HR BLOOD PRESSURE RANGE:  Systolic (81ZBN), XTX:650 , Min:122 , CSQ:126    ; Diastolic (67GNG), MW, Min:61, Max:77      Constitutional: Patient is awake alert in no distress   HEENT: Pupils are equal reactive   Respiratory: Decreased breath sounds at the bases   Cardiovascular/Edema: Heart sounds are regular rate   Gastrointestinal: Abdomen soft, nontender   Neurologic: Nonfocal   Skin: No lesions   Other: +trace Edema , right BKA and left AKA      BRIEF SUMMARY OF INITIAL CONSULT:       Briefly Mrs. Bia Spaulding is a 75-year-old female with history of HTN, type II DM, hyperlipidemia, CAD, HFrEF 38%, PVD status post right BKA and left AKA, who was recently admitted with altered mental status probably due to the gabapentin and MATTHEW possibly pneumonia.  Was readmitted on 2019 with shortness of breath, acute exacerbation of heart failure.  On admission her creatinine was 1.3 mg/dL reason for this consultation.        IMPRESSION/RECOMMENDATIONS:         1. MATTHEW stage I, probably hemodynamically mediated MATTHEW due decompensated heart failure.  Resolved. 2. HFrEF 38%, needs further diuresis, to increase Bumex to 2 mg p.o. twice daily   3. Anemia, macrocytic; with normal B12 and folate levels   4. Hyperkalemia - multifactorial/prerenal, resolved       Plan:       · Increase Bumex to 2 mg p.o. twice daily   · Continue lisinopril 5 mg daily   · Monitor potassium level   · Continue to monitor renal function.  Card Note:      IMPRESSION:   1. Ischemic CMP with mild LV dysfunction and chronic combined diastolic and systolic HF. Patient being evaluated at [facility] for possible CABG   2. Morbid Obesity   3. PAD s/p R BKA and L AKA   4. Insulin requiring DM   5. HTN   6. HLD   7. Anemia    8. RAÚL: resolved    9. BEATRICE: untreated    10. Former smoker   6. Depression        PLAN:    1. Resume BB, Nitrates, ACE-I and diuretics    2. Continue Ranexa, ASA, Plavix and statins    3. Monitor BMP   4. Consider referring patient back to [facility] as she is being evaluated for CABG. 5. Cardiology will sign off; please call if needed.        Updated: 12/31/19 0744      WBC 10.5 E9/L      RBC 3.30 E12/L      Hemoglobin 9.9 g/dL      Hematocrit 35.5 %      .6 fL      MCH 30.0 pg      MCHC 27.9 %      RDW 15.9 fL      Platelets 235 R7/U      MPV 9.5       Sodium 141 mmol/L      Potassium 5.0 mmol/L      Chloride 102 mmol/L      CO2 26 mmol/L      Anion Gap 13 mmol/L      Glucose 159 mg/dL      BUN 25 mg/dL      CREATININE 1.2 mg/dL      GFR Non-African American 46 mL/min/1.73      GFR African American 55     Calcium

## 2019-12-31 NOTE — PROGRESS NOTES
[facility]  Internal Medicine Residency Program  Progress Note - House Team 1    Patient:  Jennifer Aburto 62 y.o. female MRN: [id number]     Date of Service: 12/31/2019     CC: Shortness of breath and fatigue  Overnight events: None    Subjective   Jennifer Aburto is a 62 y.o. female on day 8 of hospital admission for shortness of breath    Patient was seen at bed side. They are awake alert and respond to questions appropriately. They have no new complaints overnight. Continues to have bowel movements. Objective     Physical Exam:  · Vitals: /77   Pulse 74   Temp 97.1 °F (36.2 °C) (Temporal)   Resp 20   Ht 5' (1.524 m)   Wt 225 lb (102.1 kg)   SpO2 94%   BMI 43.94 kg/m²       · Constitutional: Alert, AaO x3. Follows commands. In no apparent distress. · Head: Normocephalic and atraumatic. · Eyes: PERRL, conjunctiva normal, (-) scleral icterus. Mucus membranes moist.  · Mouth: Mucus membranes moist. Oropharynx clear. No deviation of the tongue or uvula. Normal dentition. · Neck: (-)  swelling, (-) JVD   · Respiratory: Lungs clear to auscultation bilaterally. (-)  wheezes, (-)  rales, (-)  rhonchi. · Cardiovascular: RRR. (-)  murmurs, (-) gallops,  (-) rubs. S1 and S2 were normal.   · GI:  Abdomen soft, non-tender, non-distended. (+) BS. (-)  rebound, (-) guarding, (-) rigidity. · Extremities: Right-sided below the knee amputation and left-sided above-the-knee amputation  · Neurologic:  Cranial nerves II-XII grossly intact. No focal neurological deficits.      Subject  Pertinent Labs & Imaging Studies   heather  CBC:   Lab Results   Component Value Date    WBC 10.5 12/31/2019    RBC 3.30 12/31/2019    HGB 9.9 12/31/2019    HCT 35.5 12/31/2019    .6 12/31/2019    MCH 30.0 12/31/2019    MCHC 27.9 12/31/2019    RDW 15.9 12/31/2019     12/31/2019    MPV 9.5 12/31/2019     BMP:    Lab Results   Component Value Date     12/31/2019    K 5.0 12/31/2019    K 4.8 12/28/2019     12/31/2019    CO2 26 12/31/2019    BUN 25 12/31/2019    LABALBU 3.7 12/30/2019    LABALBU 4.3 04/16/2012    CREATININE 1.2 12/31/2019    CALCIUM 9.3 12/31/2019    GFRAA 55 12/31/2019    LABGLOM 46 12/31/2019    GLUCOSE 159 12/31/2019    GLUCOSE 264 04/17/2012       Resident's Assessment and Plan     Iglesia Del Real is a 62 y.o. female    1. Acute hypoxic respiratory failure 2/2 pleural effusion VS decompensated HFrEF   Currently on 1 L of oxygen   Continues to complain of SOB off oxygen with exertion    Saturates poorly off O2   Continue Lopressor    Continue lisinopril    Elevated proBNP 7840   Echo 12/23/2019 EF 18% stage II diastolic dysfunction   CTA chest negative for PE: Showed bilateral pleural effusion   Pulmonology consulted  [facility] Cardiology consulted   Continue Plavix and aspirin   Increased Bumex to 2mg BID as per nephrology   2. MATTHEW (resolved)   Last CR 1.0   Baseline 0.8-1.3   Retroperitoneal US negative   We will continue to monitor renal function   Nephrology onboard    3. Adrenal insufficiency   Random cortisol 1.3   Continue hydrocortisone 20 mg p.o. daily  4. Type 2 diabetes mellitus   Continue glargine 65 units nightly   Continue lispro 18 units pre-meal   Increased sliding scale to high-dose  5. Vaginitis   White purulent vaginal secretions   Gave 1 dose of Diflucan 150 mg oral  6. Constipation (resolved)  · Patient has had bowel movements   · we will continue to monitor   · continue senna  7.  Hx MDD  · Continue sertraline 100 mg    PT/OT evaluation: Not indicated  DVT prophylaxis/ GI prophylaxis: Lovenox/Protonix  Disposition: Continue current medical management    Katie Vazquez MD, PGY-1  Attending physician: Dr. Dane Guerra

## 2019-12-31 NOTE — PROGRESS NOTES
Physical Therapy  Treatment Note    Name: Aj Ortiz  : 1960  MRN: [id number]    Date of Service: 2019    Evaluating PT:  Thom Aguirre PT, DPT    Room #:  5167/6665-H  Diagnosis:  Hypoxia   Reason for admission: SOB, fatigue   Precautions:  Falls, L AKA, R BKA  Procedures: none this admission   Equipment recommendations:  Power wheelchair, slideboard PRN    Pt lives with  in a single story house with ramped entry. Bed is on the first floor and bath is on the first floor. Pt used power WC as means of mobility prior to admission. Pt performed transfers to/from [address] Pt used sliding board for car transfers. Pt also owns standard WC. Pt does not wear a prosthetic and reports performing anterior transfers. Initial Evaluation  Date:  Treatment  19 Short Term/ Long Term   Goals   AM-PAC 6 Clicks 14 90/92    Was pt agreeable to Eval/treatment? Yes  Yes    Does pt have pain? Denies  Global discomfort from immobility    Bed Mobility  Rolling: NT  Supine to sit: NT  Sit to supine: NT  Scooting: supervision Rolling:  Mod Independent   Supine to sit: Supervision  Sit to supine: Supervision  Scooting: Supervision alog EOB  independent   Transfers Sit to stand: NT  Stand to sit: NT  Scoot pivot: supervision Pt declined Scoot pivot/slideboard <> chair/ wheelchair: Mod I   Ambulation   NA   NA >100 ft with wheelchair using BUEs SBA   Stair negotiation: ascended and descended  NT NA NA   ROM BUE:  See OT eval  BLE:  WFL     Strength BUE:  See OT eval  BLE grossly:  NA, 4/5 residual limbs   5/5   Balance Sitting EOB:  independent  Dynamic Standing:  NA Sitting EOB:  independent  Dynamic Standing:  NA Sitting EOB:  indep  Dynamic Standing:  NA   Endurance  Fair  Fair (-) Good      -Pt is A & O x 4  -Sensation:  Pt reports chronic LE neuropathy  -Edema:  Unremarkable     Patient education  Pt educated on safety, positioning in bed for improved comfort and benefits of increasing OOB activity. Patient response to education:   Pt verbalized understanding Pt demonstrated skill Pt requires further education in this area   Yes  Yes  Reinforce      Additional Comments: pt was received in side lying and reported global discomfort. After pt education on positioning in bed, pt agreeable to treatment session. Pt transferred to sitting EOB and performed lateral scooting towards HOB without assist from PT. Pt educated on avoiding shearing forces. Pt declined transferring to chair this date and requested to return to supine. Pt was left with call button within reach and all needs met. Time in: 1405  Time out: 1415    Pt is making fair progress toward established Physical Therapy goals. Continue with physical therapy current plan of care.     Amador Jacobson, PT, DPT  License [id number]

## 2019-12-31 NOTE — CARE COORDINATION
Social work followed up with patient in room. Confirmed O2 tank delivered and in room. Patient denied any additional needs. SW will continue to follow.   Electronically signed by EDIS Caban on 12/31/2019 at 12:33 PM

## 2020-01-01 ENCOUNTER — APPOINTMENT (OUTPATIENT)
Dept: GENERAL RADIOLOGY | Age: 60
DRG: 194 | End: 2020-01-01
Payer: MEDICAID

## 2020-01-01 LAB
ANION GAP SERPL CALCULATED.3IONS-SCNC: 13 MMOL/L (ref 7–16)
BUN BLDV-MCNC: 35 MG/DL (ref 8–23)
CALCIUM SERPL-MCNC: 9 MG/DL (ref 8.6–10.2)
CHLORIDE BLD-SCNC: 103 MMOL/L (ref 98–107)
CO2: 27 MMOL/L (ref 22–29)
CREAT SERPL-MCNC: 1.6 MG/DL (ref 0.5–1)
GFR AFRICAN AMERICAN: 40
GFR NON-AFRICAN AMERICAN: 33 ML/MIN/1.73
GLUCOSE BLD-MCNC: 99 MG/DL (ref 74–99)
METER GLUCOSE: 100 MG/DL (ref 74–99)
METER GLUCOSE: 158 MG/DL (ref 74–99)
METER GLUCOSE: 165 MG/DL (ref 74–99)
METER GLUCOSE: 80 MG/DL (ref 74–99)
METER GLUCOSE: 91 MG/DL (ref 74–99)
POTASSIUM SERPL-SCNC: 5.3 MMOL/L (ref 3.5–5)
SODIUM BLD-SCNC: 143 MMOL/L (ref 132–146)

## 2020-01-01 PROCEDURE — 6370000000 HC RX 637 (ALT 250 FOR IP): Performed by: INTERNAL MEDICINE

## 2020-01-01 PROCEDURE — 99232 SBSQ HOSP IP/OBS MODERATE 35: CPT | Performed by: INTERNAL MEDICINE

## 2020-01-01 PROCEDURE — 80048 BASIC METABOLIC PNL TOTAL CA: CPT

## 2020-01-01 PROCEDURE — 2700000000 HC OXYGEN THERAPY PER DAY

## 2020-01-01 PROCEDURE — 71045 X-RAY EXAM CHEST 1 VIEW: CPT

## 2020-01-01 PROCEDURE — 2580000003 HC RX 258: Performed by: INTERNAL MEDICINE

## 2020-01-01 PROCEDURE — 6360000002 HC RX W HCPCS: Performed by: INTERNAL MEDICINE

## 2020-01-01 PROCEDURE — 2500000003 HC RX 250 WO HCPCS: Performed by: INTERNAL MEDICINE

## 2020-01-01 PROCEDURE — 82962 GLUCOSE BLOOD TEST: CPT

## 2020-01-01 PROCEDURE — 2140000000 HC CCU INTERMEDIATE R&B

## 2020-01-01 PROCEDURE — 36415 COLL VENOUS BLD VENIPUNCTURE: CPT

## 2020-01-01 RX ADMIN — CLOPIDOGREL 75 MG: 75 TABLET, FILM COATED ORAL at 09:35

## 2020-01-01 RX ADMIN — GLYCERIN 2 G: 2 SUPPOSITORY RECTAL at 01:57

## 2020-01-01 RX ADMIN — HYDROCORTISONE 20 MG: 20 TABLET ORAL at 09:35

## 2020-01-01 RX ADMIN — GABAPENTIN 400 MG: 400 CAPSULE ORAL at 15:21

## 2020-01-01 RX ADMIN — INSULIN LISPRO 18 UNITS: 100 INJECTION, SOLUTION INTRAVENOUS; SUBCUTANEOUS at 07:41

## 2020-01-01 RX ADMIN — PANTOPRAZOLE SODIUM 40 MG: 40 TABLET, DELAYED RELEASE ORAL at 06:11

## 2020-01-01 RX ADMIN — FLUCONAZOLE 150 MG: 100 TABLET ORAL at 09:34

## 2020-01-01 RX ADMIN — ASPIRIN 81 MG: 81 TABLET, COATED ORAL at 09:34

## 2020-01-01 RX ADMIN — GABAPENTIN 400 MG: 400 CAPSULE ORAL at 20:57

## 2020-01-01 RX ADMIN — OXYCODONE AND ACETAMINOPHEN 1 TABLET: 5; 325 TABLET ORAL at 16:46

## 2020-01-01 RX ADMIN — SODIUM CHLORIDE, PRESERVATIVE FREE 10 ML: 5 INJECTION INTRAVENOUS at 06:00

## 2020-01-01 RX ADMIN — INSULIN LISPRO 2 UNITS: 100 INJECTION, SOLUTION INTRAVENOUS; SUBCUTANEOUS at 20:58

## 2020-01-01 RX ADMIN — RANOLAZINE 1000 MG: 500 TABLET, FILM COATED, EXTENDED RELEASE ORAL at 13:01

## 2020-01-01 RX ADMIN — SODIUM CHLORIDE, PRESERVATIVE FREE 10 ML: 5 INJECTION INTRAVENOUS at 09:34

## 2020-01-01 RX ADMIN — INSULIN GLARGINE 65 UNITS: 100 INJECTION, SOLUTION SUBCUTANEOUS at 20:57

## 2020-01-01 RX ADMIN — ENOXAPARIN SODIUM 40 MG: 40 INJECTION SUBCUTANEOUS at 09:34

## 2020-01-01 RX ADMIN — ATORVASTATIN CALCIUM 40 MG: 40 TABLET, FILM COATED ORAL at 09:34

## 2020-01-01 RX ADMIN — MICONAZOLE NITRATE: 20.6 POWDER TOPICAL at 01:57

## 2020-01-01 RX ADMIN — SENNOSIDES 8.6 MG: 8.6 TABLET, FILM COATED ORAL at 09:34

## 2020-01-01 RX ADMIN — INSULIN LISPRO 18 UNITS: 100 INJECTION, SOLUTION INTRAVENOUS; SUBCUTANEOUS at 16:47

## 2020-01-01 RX ADMIN — MICONAZOLE NITRATE: 20.6 POWDER TOPICAL at 20:57

## 2020-01-01 RX ADMIN — FOLIC ACID 1 MG: 1 TABLET ORAL at 09:34

## 2020-01-01 RX ADMIN — MICONAZOLE NITRATE: 20.6 POWDER TOPICAL at 09:35

## 2020-01-01 RX ADMIN — RANOLAZINE 1000 MG: 500 TABLET, FILM COATED, EXTENDED RELEASE ORAL at 20:57

## 2020-01-01 RX ADMIN — SENNOSIDES 8.6 MG: 8.6 TABLET, FILM COATED ORAL at 20:57

## 2020-01-01 RX ADMIN — GABAPENTIN 400 MG: 400 CAPSULE ORAL at 09:34

## 2020-01-01 RX ADMIN — SERTRALINE 100 MG: 100 TABLET, FILM COATED ORAL at 09:35

## 2020-01-01 RX ADMIN — PATIROMER 8.4 G: 8.4 POWDER, FOR SUSPENSION ORAL at 15:21

## 2020-01-01 ASSESSMENT — PAIN - FUNCTIONAL ASSESSMENT
PAIN_FUNCTIONAL_ASSESSMENT: PREVENTS OR INTERFERES SOME ACTIVE ACTIVITIES AND ADLS
PAIN_FUNCTIONAL_ASSESSMENT: ACTIVITIES ARE NOT PREVENTED

## 2020-01-01 ASSESSMENT — PAIN DESCRIPTION - ONSET: ONSET: ON-GOING

## 2020-01-01 ASSESSMENT — PAIN SCALES - GENERAL
PAINLEVEL_OUTOF10: 0
PAINLEVEL_OUTOF10: 7

## 2020-01-01 ASSESSMENT — PAIN DESCRIPTION - PROGRESSION: CLINICAL_PROGRESSION: GRADUALLY IMPROVING

## 2020-01-01 ASSESSMENT — PAIN DESCRIPTION - FREQUENCY
FREQUENCY: CONTINUOUS
FREQUENCY: INTERMITTENT

## 2020-01-01 ASSESSMENT — PAIN DESCRIPTION - PAIN TYPE
TYPE: CHRONIC PAIN
TYPE: CHRONIC PAIN

## 2020-01-01 ASSESSMENT — PAIN DESCRIPTION - LOCATION
LOCATION: LEG
LOCATION: LEG

## 2020-01-01 ASSESSMENT — PAIN DESCRIPTION - DESCRIPTORS
DESCRIPTORS: OTHER (COMMENT)
DESCRIPTORS: ACHING

## 2020-01-01 ASSESSMENT — PAIN DESCRIPTION - ORIENTATION
ORIENTATION: RIGHT;LEFT
ORIENTATION: RIGHT;LEFT

## 2020-01-01 NOTE — PROGRESS NOTES
Department of Internal Medicine  Nephrology Attending Progress Note    SUBJECTIVE: We are following Mrs. Marroquin for MATTHEW. Reports feeling better.      PHYSICAL EXAM:      Vitals:    VITALS:  BP (!) 84/52 Comment: manual  Pulse 66   Temp 96.2 °F (35.7 °C) (Temporal)   Resp 18   Ht 5' (1.524 m)   Wt 227 lb 3.2 oz (103.1 kg)   SpO2 96%   BMI 44.37 kg/m²   24HR RESPIRATORY RATE RANGE:  Resp  Av.3  Min: 18  Max: 20  24HR BLOOD PRESSURE RANGE:  Systolic (37FFM), UPF:069 , Min:84 , SUB:519   ; Diastolic (28MDU), YJE:91, Min:52, Max:64    24HR INTAKE/OUTPUT:      Intake/Output Summary (Last 24 hours) at 2020 1126  Last data filed at 2020 2452  Gross per 24 hour   Intake 1203 ml   Output 575 ml   Net 628 ml     8HR INTAKE OUTPUT:  In: 240 [P.O.:240]  Out: -      Constitutional: Patient is awake alert in no distress  HEENT: Pupils are equal reactive  Respiratory: Decreased breath sounds at the bases  Cardiovascular/Edema: Heart sounds are regular rate  Gastrointestinal: Abdomen soft, nontender  Neurologic: Nonfocal  Skin: No lesions  Other: +trace Edema , right BKA and left AKA    Scheduled Meds:   miconazole   Topical BID    insulin lispro  0-18 Units Subcutaneous TID WC    insulin lispro  0-9 Units Subcutaneous Nightly    bumetanide  2 mg Oral BID    fluconazole  150 mg Oral Daily    metoprolol succinate  100 mg Oral Daily    hydrocortisone  20 mg Oral Daily    gabapentin  400 mg Oral TID    senna  1 tablet Oral BID    lisinopril  5 mg Oral Daily    pantoprazole  40 mg Oral QAM AC    ranolazine  1,000 mg Oral BID    folic acid  1 mg Oral Daily    aspirin  81 mg Oral Daily    atorvastatin  40 mg Oral Daily    insulin glargine  65 Units Subcutaneous Nightly    insulin lispro  18 Units Subcutaneous TID AC    sertraline  100 mg Oral Daily    clopidogrel  75 mg Oral Daily    isosorbide mononitrate  30 mg Oral Daily    sodium chloride flush  10 mL Intravenous 2 times per day    enoxaparin 40 mg Subcutaneous Daily     Continuous Infusions:   dextrose       PRN Meds:.oxyCODONE-acetaminophen, docusate sodium, glucose, dextrose, glucagon (rDNA), dextrose, sodium chloride flush, magnesium hydroxide, ondansetron, acetaminophen    DATA:    CBC:   Lab Results   Component Value Date    WBC 10.5 12/31/2019    RBC 3.30 12/31/2019    HGB 9.9 12/31/2019    HCT 35.5 12/31/2019    .6 12/31/2019    MCH 30.0 12/31/2019    MCHC 27.9 12/31/2019    RDW 15.9 12/31/2019     12/31/2019    MPV 9.5 12/31/2019     CMP:    Lab Results   Component Value Date     01/01/2020    K 5.3 01/01/2020    K 4.8 12/28/2019     01/01/2020    CO2 27 01/01/2020    BUN 35 01/01/2020    CREATININE 1.6 01/01/2020    GFRAA 40 01/01/2020    LABGLOM 33 01/01/2020    GLUCOSE 99 01/01/2020    GLUCOSE 264 04/17/2012    PROT 6.5 12/30/2019    LABALBU 3.7 12/30/2019    LABALBU 4.3 04/16/2012    CALCIUM 9.0 01/01/2020    BILITOT 0.5 12/30/2019    ALKPHOS 70 12/30/2019    AST 10 12/30/2019    ALT 16 12/30/2019     Magnesium:    Lab Results   Component Value Date    MG 1.6 12/18/2019     Phosphorus:    Lab Results   Component Value Date    PHOS 4.2 12/18/2019     proBNP: 7840 ----3785    Radiology review:    CTA chest with IV contrast December 29, 2019   1. No evidence of acute pulmonary embolic disease. 2. Bilateral pleural effusions and suspected pulmonary edema   associated with cardiomegaly. CHF is suspected. 3. Mediastinal and right hilar adenopathy of indeterminate etiology. 4. Cholelithiasis. Kidney ultrasound December 28, 2019   Normal size kidneys. Discrete the pyelocaliectasis in the right kidney but no conspicuous   hydronephrosis.       No signs for hydronephrosis in the left kidney which was more   difficult to be evaluate. BRIEF SUMMARY OF INITIAL CONSULT:    Briefly Mrs. Sinan Maxwell is a 24-year-old female with history of HTN, type II DM, hyperlipidemia, CAD, HFrEF 38%, PVD status post right BKA

## 2020-01-01 NOTE — PROGRESS NOTES
[facility]  Internal Medicine Residency Program  Progress Note - House Team 1    Patient:  Jennifer Aburto 62 y.o. female MRN: [id number]     Date of Service: 1/1/2020     CC: Shortness of breath and fatigue  Overnight events: None    Subjective   Jennifer Aburto is a 62 y.o. female on day 9 of hospital admission for shortness of breath. Patient seen at bedside. She is alert, awake and oriented. No complaints overnight. Changes: increased creatinine and low blood pressure    Plan:  - to hold bumex and lisinopril (confirmed with Dr. Sammi Ho)   - holding parameters for IMDUR and metoprolol  - continue to monitor weight and urine output  - Fluconazole stopped after 3 doses - continue to assess symptom    Objective     Physical Exam:  · Vitals: BP 94/60   Pulse 66   Temp 96.2 °F (35.7 °C) (Temporal)   Resp 18   Ht 5' (1.524 m)   Wt 227 lb 3.2 oz (103.1 kg)   SpO2 96%   BMI 44.37 kg/m²       · Constitutional: Alert, AaO x3. Follows commands. In no apparent distress. · Head: Normocephalic and atraumatic. · Eyes: PERRL, conjunctiva normal, (-) scleral icterus. Mucus membranes moist.  · Mouth: Mucus membranes moist. Oropharynx clear. No deviation of the tongue or uvula. Normal dentition. · Neck: (-)  swelling, (-) JVD   · Respiratory: Lungs clear to auscultation bilaterally. (-)  wheezes, (-)  rales, (-)  rhonchi. · Cardiovascular: RRR. (-)  murmurs, (-) gallops,  (-) rubs. S1 and S2 were normal.   · GI:  Abdomen soft, non-tender, non-distended. (+) BS. (-)  rebound, (-) guarding, (-) rigidity. · Extremities: Right-sided below the knee amputation and left-sided above-the-knee amputation  · Neurologic:  Cranial nerves II-XII grossly intact. No focal neurological deficits.      Subject  Pertinent Labs & Imaging Studies   heather  CBC:   Lab Results   Component Value Date    WBC 10.5 12/31/2019    RBC 3.30 12/31/2019    HGB 9.9 12/31/2019    HCT 35.5 12/31/2019    .6 12/31/2019    MCH 30.0 12/31/2019    MCHC 27.9 12/31/2019    RDW 15.9 12/31/2019     12/31/2019    MPV 9.5 12/31/2019     BMP:    Lab Results   Component Value Date     01/01/2020    K 5.3 01/01/2020    K 4.8 12/28/2019     01/01/2020    CO2 27 01/01/2020    BUN 35 01/01/2020    LABALBU 3.7 12/30/2019    LABALBU 4.3 04/16/2012    CREATININE 1.6 01/01/2020    CALCIUM 9.0 01/01/2020    GFRAA 40 01/01/2020    LABGLOM 33 01/01/2020    GLUCOSE 99 01/01/2020    GLUCOSE 264 04/17/2012       Resident's Assessment and Plan     Kenzie West is a 62 y.o. female    1. Acute hypoxic respiratory failure 2/2 pleural effusion 2/2 decompensated HFrEF   Currently on 3 L of oxygen   Continues to complain of SOB off oxygen with exertion    Saturates poorly off O2   Continue Lopressor    Hold lisinopril    Elevated proBNP 9530   Echo 12/23/2019 EF 05% stage II diastolic dysfunction   CTA chest negative for PE: Showed bilateral pleural effusion   Pulmonology consulted - no need for thoracocentesis at this time  [facility] onboard   Continue Plavix and aspirin   Increased Bumex to 2mg BID as per nephrology - held due to increasing MATTHEW  2. MATTHEW   Last CR 1.6   Baseline 0.8-1.3   Retroperitoneal US negative   We will continue to monitor renal function   Nephrology onboard    Bumex and lisinopril held   3. Adrenal insufficiency   Random cortisol 1.3   Continue hydrocortisone 20 mg p.o. daily  4. Type 2 diabetes mellitus   Continue glargine 65 units nightly   Continue lispro 18 units pre-meal   Increased sliding scale to high-dose  5. Vaginitis, treated   White purulent vaginal secretions   Treated with 3 day Fluconazole 150mg   6. Constipation (resolved)  · Patient has had bowel movements   · we will continue to monitor   · continue senna  7.  Hx MDD  · Continue sertraline 100 mg    PT/OT evaluation: Not indicated  DVT prophylaxis/ GI prophylaxis: Lovenox/Protonix  Disposition: Continue current medical management    Mckay Topete MD, PGY-1  Attending physician: Dr. Loreto Hameed

## 2020-01-01 NOTE — PROGRESS NOTES
This RN attempted to place a new IV site x 2  Unable to do so.  Will request additional effort by leatha TIM

## 2020-01-01 NOTE — PROGRESS NOTES
Pulmonary [facility]                             Pulmonary Consult/Progress Note :    CC :Patient was admitted with SOB and hypoxia       Subjective     Continue to be Doing great  With diuresis   Orthopnea has improved  On NC   On Bumex 1 mg bid   On decadron as well   Feels much better  No chest pain   CTA improved        Objective     VS: /64   Pulse 73   Temp 96.4 °F (35.8 °C) (Temporal)   Resp 20   Ht 5' (1.524 m)   Wt 225 lb (102.1 kg)   SpO2 97%   BMI 43.94 kg/m²           I & O - 24hr:     Intake/Output Summary (Last 24 hours) at 12/31/2019 2246  Last data filed at 12/31/2019 1811  Gross per 24 hour   Intake 840 ml   Output --   Net 840 ml       Physical Exam:  · General Appearance: alert, appears stated age and cooperative  · Neck: no adenopathy, no carotid bruit, no JVD, supple, symmetrical, trachea midline and thyroid not enlarged, symmetric, no tenderness/mass/nodules  · Lung: clear to auscultation bilaterally  · Heart: regular rate and rhythm, S1, S2 normal, no murmur, click, rub or gallop  · Abdomen: soft, non-tender; bowel sounds normal; no masses,  no organomegaly  · Extremities:  no cyanosis, no clubbing, no edema and Rt BKA and Lt AKA  · Musculoskeletal: No joint swelling, no muscle tenderness. ROM normal in all joints of extremities.    · Neurologic: Mental status: Alert, oriented, thought content appropriate, no cranial nerve deficit and no focal deficits          ABG:     Lab Results   Component Value Date    PH 7.364 12/28/2019    PCO2 48.9 12/28/2019    PO2 72.8 12/28/2019    HCO3 27.3 12/28/2019    BE 1.4 12/28/2019    THB 10.3 12/28/2019    O2SAT 93.9 12/28/2019          Labs     CBC with Differential:    Lab Results   Component Value Date    WBC 10.5 12/31/2019    RBC 3.30 12/31/2019    HGB 9.9 12/31/2019    HCT 35.5 12/31/2019     12/31/2019    .6 12/31/2019    MCH 30.0 12/31/2019    MCHC 27.9 12/31/2019    RDW 15.9 12/31/2019    NRBC 1 09/21/2012    SEGSPCT 50 03/21/2014    LYMPHOPCT 12.4 12/23/2019    MONOPCT 5.3 12/23/2019    BASOPCT 0.7 12/23/2019    MONOSABS 0.44 12/23/2019    LYMPHSABS 1.07 12/23/2019    EOSABS 0.24 12/23/2019    BASOSABS 0.00 12/23/2019     CMP:    Lab Results   Component Value Date     12/31/2019    K 5.0 12/31/2019    K 4.8 12/28/2019     12/31/2019    CO2 26 12/31/2019    BUN 25 12/31/2019    CREATININE 1.2 12/31/2019    GFRAA 55 12/31/2019    LABGLOM 46 12/31/2019    GLUCOSE 159 12/31/2019    GLUCOSE 264 04/17/2012    PROT 6.5 12/30/2019    LABALBU 3.7 12/30/2019    LABALBU 4.3 04/16/2012    CALCIUM 9.3 12/31/2019    BILITOT 0.5 12/30/2019    ALKPHOS 70 12/30/2019    AST 10 12/30/2019    ALT 16 12/30/2019     Calcium:    Lab Results   Component Value Date    CALCIUM 9.3 12/31/2019     Magnesium:    Lab Results   Component Value Date    MG 1.6 12/18/2019     Phosphorus:    Lab Results   Component Value Date    PHOS 4.2 12/18/2019     PT/INR:    Lab Results   Component Value Date    PROTIME 12.2 12/13/2019    INR 1.1 12/13/2019     PTT:    Lab Results   Component Value Date    APTT 28.4 12/13/2019   [APTT}    Imaging Studies:  CXR: stable and improved        Assessment and Plan       1. Acute hypoxic respiratory failure 2 P edema,   On diuresis     2. Pleural effusion   Seems mild and no wbc ,no fever ,in the setting of CHF with mild effusion and improving with diuresis ,I do not see need for thoracentesis           3. Acute decompensated heart failure reduced ejection fraction-secondary to ischemic   Cardiomyopathy. *-  Chest x-ray with evidence of pulmonary congestion.       *-Patient's current EF at    38%.  Follows with             cardiology at [facility]; improved with diuresis and suppose to be evaluated for CABG    4. Has sleep apnea ,will need nasal pillow mask,could not  Tolerate the full face mask    5, Doubt COPD ,smoke for short time     5. Adrenal Insufficiency -on decadron as

## 2020-01-01 NOTE — PROGRESS NOTES
[facility]  Internal Medicine Residency / [address]    Attending Physician Statement, covering attending  I have discussed the case, including pertinent history and exam findings with the resident and the team.  I have seen and examined the patient, reviewed meds and pertinent labs and the key elements of the encounter have been performed by me. I agree with the assessment, plan and orders as documented by the resident. Patient has no complaints of dyspnea. Her BUN and creatine are increased and BP is low, would hold bumex and ACE inhibitor. Remainder of medical problems as per resident note.       Bernice Gonsales  Internal Medicine Residency Faculty

## 2020-01-02 VITALS
OXYGEN SATURATION: 93 % | SYSTOLIC BLOOD PRESSURE: 143 MMHG | DIASTOLIC BLOOD PRESSURE: 75 MMHG | BODY MASS INDEX: 44.76 KG/M2 | RESPIRATION RATE: 16 BRPM | HEIGHT: 60 IN | HEART RATE: 77 BPM | WEIGHT: 228 LBS | TEMPERATURE: 97 F

## 2020-01-02 LAB
ANION GAP SERPL CALCULATED.3IONS-SCNC: 11 MMOL/L (ref 7–16)
BUN BLDV-MCNC: 33 MG/DL (ref 8–23)
CALCIUM SERPL-MCNC: 9.4 MG/DL (ref 8.6–10.2)
CHLORIDE BLD-SCNC: 104 MMOL/L (ref 98–107)
CO2: 26 MMOL/L (ref 22–29)
CREAT SERPL-MCNC: 1.2 MG/DL (ref 0.5–1)
GFR AFRICAN AMERICAN: 55
GFR NON-AFRICAN AMERICAN: 46 ML/MIN/1.73
GLUCOSE BLD-MCNC: 56 MG/DL (ref 74–99)
METER GLUCOSE: 102 MG/DL (ref 74–99)
METER GLUCOSE: 124 MG/DL (ref 74–99)
METER GLUCOSE: 346 MG/DL (ref 74–99)
POTASSIUM SERPL-SCNC: 4.6 MMOL/L (ref 3.5–5)
SODIUM BLD-SCNC: 141 MMOL/L (ref 132–146)

## 2020-01-02 PROCEDURE — 6370000000 HC RX 637 (ALT 250 FOR IP): Performed by: INTERNAL MEDICINE

## 2020-01-02 PROCEDURE — 36415 COLL VENOUS BLD VENIPUNCTURE: CPT

## 2020-01-02 PROCEDURE — 99231 SBSQ HOSP IP/OBS SF/LOW 25: CPT | Performed by: INTERNAL MEDICINE

## 2020-01-02 PROCEDURE — 2580000003 HC RX 258: Performed by: INTERNAL MEDICINE

## 2020-01-02 PROCEDURE — 80048 BASIC METABOLIC PNL TOTAL CA: CPT

## 2020-01-02 PROCEDURE — 6360000002 HC RX W HCPCS: Performed by: INTERNAL MEDICINE

## 2020-01-02 PROCEDURE — 2700000000 HC OXYGEN THERAPY PER DAY

## 2020-01-02 PROCEDURE — 99232 SBSQ HOSP IP/OBS MODERATE 35: CPT | Performed by: INTERNAL MEDICINE

## 2020-01-02 PROCEDURE — 82962 GLUCOSE BLOOD TEST: CPT

## 2020-01-02 RX ORDER — BUMETANIDE 1 MG/1
2 TABLET ORAL DAILY
Status: DISCONTINUED | OUTPATIENT
Start: 2020-01-02 | End: 2020-01-02 | Stop reason: HOSPADM

## 2020-01-02 RX ADMIN — MICONAZOLE NITRATE: 20.6 POWDER TOPICAL at 09:23

## 2020-01-02 RX ADMIN — ATORVASTATIN CALCIUM 40 MG: 40 TABLET, FILM COATED ORAL at 09:21

## 2020-01-02 RX ADMIN — BUMETANIDE 2 MG: 1 TABLET ORAL at 12:25

## 2020-01-02 RX ADMIN — PANTOPRAZOLE SODIUM 40 MG: 40 TABLET, DELAYED RELEASE ORAL at 06:33

## 2020-01-02 RX ADMIN — METOPROLOL SUCCINATE 100 MG: 100 TABLET, EXTENDED RELEASE ORAL at 09:21

## 2020-01-02 RX ADMIN — SODIUM CHLORIDE, PRESERVATIVE FREE 10 ML: 5 INJECTION INTRAVENOUS at 09:21

## 2020-01-02 RX ADMIN — FOLIC ACID 1 MG: 1 TABLET ORAL at 09:28

## 2020-01-02 RX ADMIN — ASPIRIN 81 MG: 81 TABLET, COATED ORAL at 09:21

## 2020-01-02 RX ADMIN — INSULIN LISPRO 18 UNITS: 100 INJECTION, SOLUTION INTRAVENOUS; SUBCUTANEOUS at 06:29

## 2020-01-02 RX ADMIN — INSULIN LISPRO 18 UNITS: 100 INJECTION, SOLUTION INTRAVENOUS; SUBCUTANEOUS at 17:56

## 2020-01-02 RX ADMIN — HYDROCORTISONE 20 MG: 20 TABLET ORAL at 09:20

## 2020-01-02 RX ADMIN — PATIROMER 8.4 G: 8.4 POWDER, FOR SUSPENSION ORAL at 09:40

## 2020-01-02 RX ADMIN — ENOXAPARIN SODIUM 40 MG: 40 INJECTION SUBCUTANEOUS at 09:19

## 2020-01-02 RX ADMIN — SODIUM CHLORIDE, PRESERVATIVE FREE 10 ML: 5 INJECTION INTRAVENOUS at 00:36

## 2020-01-02 RX ADMIN — RANOLAZINE 1000 MG: 500 TABLET, FILM COATED, EXTENDED RELEASE ORAL at 09:20

## 2020-01-02 RX ADMIN — SERTRALINE 100 MG: 100 TABLET, FILM COATED ORAL at 09:20

## 2020-01-02 RX ADMIN — CLOPIDOGREL 75 MG: 75 TABLET, FILM COATED ORAL at 09:21

## 2020-01-02 RX ADMIN — GABAPENTIN 400 MG: 400 CAPSULE ORAL at 09:28

## 2020-01-02 RX ADMIN — OXYCODONE AND ACETAMINOPHEN 1 TABLET: 5; 325 TABLET ORAL at 03:30

## 2020-01-02 RX ADMIN — GABAPENTIN 400 MG: 400 CAPSULE ORAL at 16:17

## 2020-01-02 RX ADMIN — ISOSORBIDE MONONITRATE 30 MG: 30 TABLET ORAL at 09:20

## 2020-01-02 ASSESSMENT — PAIN DESCRIPTION - PROGRESSION: CLINICAL_PROGRESSION: NOT CHANGED

## 2020-01-02 ASSESSMENT — PAIN DESCRIPTION - PAIN TYPE: TYPE: CHRONIC PAIN;OTHER (COMMENT)

## 2020-01-02 ASSESSMENT — PAIN - FUNCTIONAL ASSESSMENT: PAIN_FUNCTIONAL_ASSESSMENT: ACTIVITIES ARE NOT PREVENTED

## 2020-01-02 ASSESSMENT — PAIN SCALES - GENERAL
PAINLEVEL_OUTOF10: 7
PAINLEVEL_OUTOF10: 0

## 2020-01-02 ASSESSMENT — PAIN DESCRIPTION - LOCATION: LOCATION: LEG

## 2020-01-02 ASSESSMENT — PAIN DESCRIPTION - ORIENTATION: ORIENTATION: RIGHT;LEFT

## 2020-01-02 ASSESSMENT — PAIN DESCRIPTION - ONSET: ONSET: ON-GOING

## 2020-01-02 ASSESSMENT — PAIN DESCRIPTION - FREQUENCY: FREQUENCY: CONTINUOUS

## 2020-01-02 NOTE — PROGRESS NOTES
Pt and  given d/c instructions emphasizing to f/u with internal med, take meds as directed, pt had post void after f/c removed, pt has all belongings including home O2, pt being transferred by personal w/c to personal car by

## 2020-01-02 NOTE — CARE COORDINATION
Care Coordination:  Met with pt, dc order on chart. Wishes [facility] and had [address] in the past. Phoebe Perez from [facility] notified of referrel. Resident messaged for hhc orders. Pt would like CPA med compliance, safety assessment. Declines home ptx and otx. [id number]za  was here today to see her [phone] and she will follow for ramp at home. Pt aware and given brian's phone number to contact her. Perfect serve  Divya Rodriguez MD   Patient: Arben Servant   1/2/20 3:21 PM   [phone] Case Management From: Fozia Higgins [facility] RE: Butchelykenneth Kincaid RM: [id number] Pt will need hhc orders. Need orders for Skilled nursing, cpa , med compliance and safety assessment. Declines Ptx and otx. Please place orders [address]! Read 3:22 PM     1/2/20 3:54 PM   Can you please enter Domenic Costa orders, so that referral can be made.  Thank you  Read 3:54 PM     Orders on DC instructions, julio césar from [address] notified and they will pull orders from there      Fozia Higgins

## 2020-01-02 NOTE — PROGRESS NOTES
enoxaparin  40 mg Subcutaneous Daily     Continuous Infusions:   dextrose       PRN Meds:.oxyCODONE-acetaminophen, docusate sodium, glucose, dextrose, glucagon (rDNA), dextrose, sodium chloride flush, magnesium hydroxide, ondansetron, acetaminophen    DATA:    CBC:   Lab Results   Component Value Date    WBC 10.5 12/31/2019    RBC 3.30 12/31/2019    HGB 9.9 12/31/2019    HCT 35.5 12/31/2019    .6 12/31/2019    MCH 30.0 12/31/2019    MCHC 27.9 12/31/2019    RDW 15.9 12/31/2019     12/31/2019    MPV 9.5 12/31/2019     CMP:    Lab Results   Component Value Date     01/02/2020    K 4.6 01/02/2020    K 4.8 12/28/2019     01/02/2020    CO2 26 01/02/2020    BUN 33 01/02/2020    CREATININE 1.2 01/02/2020    GFRAA 55 01/02/2020    LABGLOM 46 01/02/2020    GLUCOSE 56 01/02/2020    GLUCOSE 264 04/17/2012    PROT 6.5 12/30/2019    LABALBU 3.7 12/30/2019    LABALBU 4.3 04/16/2012    CALCIUM 9.4 01/02/2020    BILITOT 0.5 12/30/2019    ALKPHOS 70 12/30/2019    AST 10 12/30/2019    ALT 16 12/30/2019     Magnesium:    Lab Results   Component Value Date    MG 1.6 12/18/2019     Phosphorus:    Lab Results   Component Value Date    PHOS 4.2 12/18/2019     proBNP: 7840 ----3785    Radiology review:    CTA chest with IV contrast December 29, 2019   1. No evidence of acute pulmonary embolic disease. 2. Bilateral pleural effusions and suspected pulmonary edema   associated with cardiomegaly. CHF is suspected. 3. Mediastinal and right hilar adenopathy of indeterminate etiology. 4. Cholelithiasis. Kidney ultrasound December 28, 2019   Normal size kidneys. Discrete the pyelocaliectasis in the right kidney but no conspicuous   hydronephrosis.       No signs for hydronephrosis in the left kidney which was more   difficult to be evaluate. BRIEF SUMMARY OF INITIAL CONSULT:    Briefly Mrs. Bia Spaulding is a 75-year-old female with history of HTN, type II DM, hyperlipidemia, CAD, HFrEF 38%, PVD status post right BKA and left AKA, who was recently admitted with altered mental status probably due to the gabapentin and MATTHEW possibly pneumonia. Was readmitted on December 23, 2019 with shortness of breath, acute exacerbation of heart failure. On admission her creatinine was 1.3 mg/dL reason for this consultation. IMPRESSION/RECOMMENDATIONS:      1. Recurrent MATTHEW stage I, probably hemodynamically mediated MATTHEW due ACE inhibition and diuretics; renal function improved    2. HFrEF 38%  3. Anemia, macrocytic; with normal B12 and folate levels  4. Hyperkalemia - multifactorial/prerenal, decrease GFR, ACE inhibition, holding lisinopril; resolved.     Plan:    · Restart Bumex 2 mg p.o. daily  · Continue to hold lisinopril   · Continue patiromer 8.4 gr daily   · Continue low K diet  · Continue to monitor renal function.

## 2020-01-02 NOTE — PROGRESS NOTES
Pulmonary [address] [facility]                             Pulmonary Consult/Progress Note :    CC :Patient was admitted with SOB and hypoxia       Subjective     Continue to be Doing great  With diuresis   Per I&Os still 6 L + ,not sure ho accurate   Orthopnea has improved  On NC   On Bumex 2 mg daily    On decadron as well   Feels much better  No chest pain   CTA  Reviewed         Objective     VS: /60   Pulse 72   Temp 97 °F (36.1 °C) (Temporal)   Resp 16   Ht 5' (1.524 m)   Wt 228 lb (103.4 kg)   SpO2 93%   BMI 44.53 kg/m²           I & O - 24hr:     Intake/Output Summary (Last 24 hours) at 1/2/2020 1143  Last data filed at 1/2/2020 0919  Gross per 24 hour   Intake 710 ml   Output 1250 ml   Net -540 ml       Physical Exam:  · General Appearance: alert, appears stated age and cooperative  · Neck: no adenopathy, no carotid bruit, no JVD, supple, symmetrical, trachea midline and thyroid not enlarged, symmetric, no tenderness/mass/nodules  · Lung: clear to auscultation bilaterally  · Heart: regular rate and rhythm, S1, S2 normal, no murmur, click, rub or gallop  · Abdomen: soft, non-tender; bowel sounds normal; no masses,  no organomegaly  · Extremities:  no cyanosis, no clubbing, no edema and Rt BKA and Lt AKA  · Musculoskeletal: No joint swelling, no muscle tenderness. ROM normal in all joints of extremities.    · Neurologic: Mental status: Alert, oriented, thought content appropriate, no cranial nerve deficit and no focal deficits          ABG:     Lab Results   Component Value Date    PH 7.364 12/28/2019    PCO2 48.9 12/28/2019    PO2 72.8 12/28/2019    HCO3 27.3 12/28/2019    BE 1.4 12/28/2019    THB 10.3 12/28/2019    O2SAT 93.9 12/28/2019          Labs     CBC with Differential:    Lab Results   Component Value Date    WBC 10.5 12/31/2019    RBC 3.30 12/31/2019    HGB 9.9 12/31/2019    HCT 35.5 12/31/2019     12/31/2019    .6 12/31/2019    MCH 30.0 12/31/2019    MCHC 27.9 12/31/2019    RDW 15.9 12/31/2019    NRBC 1 09/21/2012    SEGSPCT 50 03/21/2014    LYMPHOPCT 12.4 12/23/2019    MONOPCT 5.3 12/23/2019    BASOPCT 0.7 12/23/2019    MONOSABS 0.44 12/23/2019    LYMPHSABS 1.07 12/23/2019    EOSABS 0.24 12/23/2019    BASOSABS 0.00 12/23/2019     CMP:    Lab Results   Component Value Date     01/02/2020    K 4.6 01/02/2020    K 4.8 12/28/2019     01/02/2020    CO2 26 01/02/2020    BUN 33 01/02/2020    CREATININE 1.2 01/02/2020    GFRAA 55 01/02/2020    LABGLOM 46 01/02/2020    GLUCOSE 56 01/02/2020    GLUCOSE 264 04/17/2012    PROT 6.5 12/30/2019    LABALBU 3.7 12/30/2019    LABALBU 4.3 04/16/2012    CALCIUM 9.4 01/02/2020    BILITOT 0.5 12/30/2019    ALKPHOS 70 12/30/2019    AST 10 12/30/2019    ALT 16 12/30/2019     Calcium:    Lab Results   Component Value Date    CALCIUM 9.4 01/02/2020     Magnesium:    Lab Results   Component Value Date    MG 1.6 12/18/2019     Phosphorus:    Lab Results   Component Value Date    PHOS 4.2 12/18/2019     PT/INR:    Lab Results   Component Value Date    PROTIME 12.2 12/13/2019    INR 1.1 12/13/2019     PTT:    Lab Results   Component Value Date    APTT 28.4 12/13/2019   [APTT}    Imaging Studies:  CXR: stable and improved        Assessment and Plan       1. Acute hypoxic respiratory failure 2 fluid overload/HFpEF /diastolic dysfunction II             Need fluid restriction 1600 cc daily ,especially with low BP and not able to diuresis          well   Bumex 2 mg                      2. Pleural effusion   Seems mild and no wbc ,no fever ,in the setting of CHF with mild effusion and improving with diuresis ,I do not see need for thoracentesis       3.  Acute decompensated heart failure preserved ejection fractionalong with grade II diastolic dysfunction          *-  Chest x-ray with evidence of pulmonary congestion.           4. Has sleep apnea ,she need to pick her machine and start using after discharge    5, Doubt

## 2020-01-02 NOTE — PROGRESS NOTES
HCT 35.5 12/31/2019     12/31/2019    .6 12/31/2019    MCH 30.0 12/31/2019    MCHC 27.9 12/31/2019    RDW 15.9 12/31/2019    NRBC 1 09/21/2012    SEGSPCT 50 03/21/2014    LYMPHOPCT 12.4 12/23/2019    MONOPCT 5.3 12/23/2019    BASOPCT 0.7 12/23/2019    MONOSABS 0.44 12/23/2019    LYMPHSABS 1.07 12/23/2019    EOSABS 0.24 12/23/2019    BASOSABS 0.00 12/23/2019     CMP:    Lab Results   Component Value Date     01/01/2020    K 5.3 01/01/2020    K 4.8 12/28/2019     01/01/2020    CO2 27 01/01/2020    BUN 35 01/01/2020    CREATININE 1.6 01/01/2020    GFRAA 40 01/01/2020    LABGLOM 33 01/01/2020    GLUCOSE 99 01/01/2020    GLUCOSE 264 04/17/2012    PROT 6.5 12/30/2019    LABALBU 3.7 12/30/2019    LABALBU 4.3 04/16/2012    CALCIUM 9.0 01/01/2020    BILITOT 0.5 12/30/2019    ALKPHOS 70 12/30/2019    AST 10 12/30/2019    ALT 16 12/30/2019     Calcium:    Lab Results   Component Value Date    CALCIUM 9.0 01/01/2020     Magnesium:    Lab Results   Component Value Date    MG 1.6 12/18/2019     Phosphorus:    Lab Results   Component Value Date    PHOS 4.2 12/18/2019     PT/INR:    Lab Results   Component Value Date    PROTIME 12.2 12/13/2019    INR 1.1 12/13/2019     PTT:    Lab Results   Component Value Date    APTT 28.4 12/13/2019   [APTT}    Imaging Studies:  CXR: stable and improved        Assessment and Plan       1. Acute hypoxic respiratory failure 2 fluid overload/HFpEF /diastolic dysfunction II                     Need fluid restriction 1600 cc daily ,especially with low BP and not able to diuresis well                      2. Pleural effusion   Seems mild and no wbc ,no fever ,in the setting of CHF with mild effusion and improving with diuresis ,I do not see need for thoracentesis       3.  Acute decompensated heart failure preserved ejection fractionalong with grade II diastolic dysfunction          *-  Chest x-ray with evidence of pulmonary congestion.           4. Has sleep apnea ,will need

## 2020-01-02 NOTE — PROGRESS NOTES
[address] [facility]  Internal Medicine Residency / [address]    Attending Physician Statement  I have discussed the case, including pertinent history and exam findings with the resident and the team.  I have seen and examined the patient and the key elements of the encounter have been performed by me. I agree with the assessment, plan and orders as documented by the resident. DM 2 , CHF systolic, CRF   Bilateral AKA  A&O  VS stable  Cr 1.2  H&L same   Transfers not assessed   Plan; Discharge planning on same meds     Remainder of medical problems as per resident note.       Prabha Morin  Internal Medicine Residency Faculty

## 2020-01-02 NOTE — DISCHARGE SUMMARY
radiological, pathology, other tests):   · Constitutional: Alert, AaO x3. Follows commands. In no apparent distress. · Head: Normocephalic and atraumatic. · Eyes: PERRL, conjunctiva normal, (-) scleral icterus. Mucus membranes moist.  · Mouth: Mucus membranes moist. Oropharynx clear. No deviation of the tongue or uvula. Normal dentition. · Neck: (-)  swelling, (-) JVD   · Respiratory: Lungs clear to auscultation bilaterally. (-)  wheezes, (-)  rales, (-)  rhonchi. · Cardiovascular: RRR. (-)  murmurs, (-) gallops,  (-) rubs. S1 and S2 were normal.   · GI:  Abdomen soft, non-tender, non-distended. (+) BS. (-)  rebound, (-) guarding, (-) rigidity.     · Extremities: Right-sided below the knee amputation and left-sided above-the-knee amputation  · Neurologic:  Cranial nerves II-XII grossly intact. No focal neurological deficits. Pending test results: none    Consults:  1. Pulmonology   2. Cardiology  3. Nephrology     Procedures:  1.  None     Condition at discharge: Stable    Disposition: home    Discharge Medications:  Current Discharge Medication List      CONTINUE these medications which have NOT CHANGED    Details   aspirin 81 MG EC tablet Take 81 mg by mouth daily      atorvastatin (LIPITOR) 40 MG tablet Take 40 mg by mouth daily      docusate sodium (COLACE) 100 MG capsule Take 100 mg by mouth daily as needed for Constipation      insulin glargine (LANTUS SOLOSTAR) 100 UNIT/ML injection Inject 65 Units into the skin nightly      insulin lispro (HUMALOG) 100 UNIT/ML injection vial Inject 18 Units into the skin 3 times daily (before meals)      nitroGLYCERIN (NITROSTAT) 0.4 MG SL tablet Place 0.4 mg under the tongue every 5 minutes as needed for Chest pain      sertraline (ZOLOFT) 100 MG tablet Take 100 mg by mouth daily      clopidogrel (PLAVIX) 75 MG tablet Take 75 mg by mouth daily      lisinopril (PRINIVIL;ZESTRIL) 5 MG tablet Take 5 mg by mouth daily      metoprolol (LOPRESSOR) 100 MG tablet Take 100 mg by mouth 2 times daily      amLODIPine (NORVASC) 2.5 MG tablet Take 2.5 mg by mouth daily      influenza virus trivalent vaccine (FLUZONE) injection Inject 0.5 mLs into the muscle once Given 11/2019      acetaminophen (TYLENOL) 325 MG tablet Take 325 mg by mouth every 6 hours as needed for Pain      isosorbide mononitrate (IMDUR) 30 MG extended release tablet Take 1 tablet by mouth daily  Qty: 30 tablet, Refills: 3      bumetanide (BUMEX) 1 MG tablet Take 1 tablet by mouth daily  Qty: 30 tablet, Refills: 3      folic acid (FOLVITE) 1 MG tablet Take 1 tablet by mouth daily  Qty: 30 tablet, Refills: 3      ranolazine (RANEXA) 1000 MG extended release tablet Take 1 tablet by mouth 2 times daily  Qty: 60 tablet, Refills: 3      pantoprazole (PROTONIX) 40 MG tablet Take 40 mg by mouth every morning (before breakfast)      gabapentin (NEURONTIN) 600 MG tablet Take 1 tablet by mouth 3 times daily for 90 days. Qty: 90 tablet, Refills: 2      HYDROcodone-acetaminophen (NORCO) 5-325 MG per tablet Take 1 tablet by mouth every 6 hours as needed for Pain for up to 30 days. Qty: 120 tablet, Refills: 0    Comments: Reduce doses taken as pain becomes manageable  Associated Diagnoses: Neuropathic pain; Chronic pain syndrome      blood glucose test strips (ONE TOUCH ULTRA TEST) strip use TEST STRIP four times a day  Qty: 100 strip, Refills: 3    Associated Diagnoses: Type 2 diabetes mellitus with diabetic polyneuropathy, unspecified whether long term insulin use (HCC)      Insulin Syringe-Needle U-100 (BD INSULIN SYRINGE U/F) 31G X 5/16\" 1 ML MISC use to inject INSULIN UNDER THE SKIN four times a day  Qty: 100 each, Refills: 2             Activity: activity as tolerated  Diet: cardiac diet, diabetic diet and low fat, low cholesterol diet    Follow-up appointments:   1. Follow up at internal medicine clinic. You will receive a call with the date and time of your appointment with in 7 day. If you do not please call the clinic.  Phone:

## 2020-01-03 ENCOUNTER — TELEPHONE (OUTPATIENT)
Dept: INTERNAL MEDICINE | Age: 60
End: 2020-01-03

## 2020-01-06 ENCOUNTER — TELEPHONE (OUTPATIENT)
Dept: CARDIOLOGY CLINIC | Age: 60
End: 2020-01-06

## 2020-01-06 ENCOUNTER — TELEPHONE (OUTPATIENT)
Dept: INTERNAL MEDICINE | Age: 60
End: 2020-01-06

## 2020-01-06 NOTE — TELEPHONE ENCOUNTER
[facility] Trans[facility] Initial Follow Up Call    Outreach made within 2 business days of discharge: Yes    Patient: Shayy Kincaid Patient : 1960   MRN: [id number]  Reason for Admission: There are no discharge diagnoses documented for the most recent discharge. Discharge Date: 20       Spoke with: 2nd attempt.  Left Voicemail    Discharge department/facility: [facility]    Scheduled appointment with PCP within 7-14 days    Follow Up  Future Appointments   Date Time Provider Kirk Bautista   2020  9:30 AM Rossana Duran MD [facility] None       Dorothy Cart

## 2020-01-07 ENCOUNTER — TELEPHONE (OUTPATIENT)
Dept: INTERNAL MEDICINE | Age: 60
End: 2020-01-07

## 2020-01-07 RX ORDER — DIPHENHYDRAMINE HYDROCHLORIDE 25 MG/1
1 CAPSULE, LIQUID FILLED ORAL
COMMUNITY
Start: 2020-01-07 | End: 2020-01-10 | Stop reason: SDUPTHER

## 2020-01-07 RX ORDER — DIPHENHYDRAMINE HYDROCHLORIDE 25 MG/1
1 CAPSULE, LIQUID FILLED ORAL DAILY
Qty: 1 KIT | Refills: 0 | Status: CANCELLED | OUTPATIENT
Start: 2020-01-07

## 2020-01-10 ENCOUNTER — TELEPHONE (OUTPATIENT)
Dept: INTERNAL MEDICINE | Age: 60
End: 2020-01-10

## 2020-01-12 RX ORDER — DIPHENHYDRAMINE HYDROCHLORIDE 25 MG/1
1 CAPSULE, LIQUID FILLED ORAL 4 TIMES DAILY
Qty: 1 KIT | Refills: 0 | Status: SHIPPED | OUTPATIENT
Start: 2020-01-12

## 2020-01-20 ENCOUNTER — OFFICE VISIT (OUTPATIENT)
Dept: CARDIOLOGY CLINIC | Age: 60
End: 2020-01-20
Payer: MEDICAID

## 2020-01-20 VITALS
HEART RATE: 69 BPM | DIASTOLIC BLOOD PRESSURE: 58 MMHG | SYSTOLIC BLOOD PRESSURE: 102 MMHG | RESPIRATION RATE: 20 BRPM | OXYGEN SATURATION: 94 % | BODY MASS INDEX: 44.53 KG/M2 | HEIGHT: 60 IN

## 2020-01-20 PROCEDURE — 3017F COLORECTAL CA SCREEN DOC REV: CPT | Performed by: NURSE PRACTITIONER

## 2020-01-20 PROCEDURE — 99214 OFFICE O/P EST MOD 30 MIN: CPT | Performed by: NURSE PRACTITIONER

## 2020-01-20 PROCEDURE — 93000 ELECTROCARDIOGRAM COMPLETE: CPT | Performed by: INTERNAL MEDICINE

## 2020-01-20 PROCEDURE — 2022F DILAT RTA XM EVC RTNOPTHY: CPT | Performed by: NURSE PRACTITIONER

## 2020-01-20 PROCEDURE — 1111F DSCHRG MED/CURRENT MED MERGE: CPT | Performed by: NURSE PRACTITIONER

## 2020-01-20 PROCEDURE — G8484 FLU IMMUNIZE NO ADMIN: HCPCS | Performed by: NURSE PRACTITIONER

## 2020-01-20 PROCEDURE — G8427 DOCREV CUR MEDS BY ELIG CLIN: HCPCS | Performed by: NURSE PRACTITIONER

## 2020-01-20 PROCEDURE — G8417 CALC BMI ABV UP PARAM F/U: HCPCS | Performed by: NURSE PRACTITIONER

## 2020-01-20 PROCEDURE — 3046F HEMOGLOBIN A1C LEVEL >9.0%: CPT | Performed by: NURSE PRACTITIONER

## 2020-01-20 PROCEDURE — 1036F TOBACCO NON-USER: CPT | Performed by: NURSE PRACTITIONER

## 2020-01-20 ASSESSMENT — EJECTION FRACTION
EF_VALUE: 50-55%
EF_SOURCE: 2D ECHO

## 2020-01-20 NOTE — PATIENT INSTRUCTIONS
1. Continue current cardiac medications. 2. HF infusion clinic in one week    3. Weigh yourself daily - will look into if insurance covers wheelchair scale     -Stay Hydrated    -Diet should sodium restricted to 2 grams    -Again watch your daily weight trends and if you gain water weight please follow below instructions.    -If you gain 3-5 pounds in 2-3 days OR notice that you are retaining fluid in anyway just like you did before then take an extra dose of your water pill (bumetanide/Bumex) every day until you lose the weight or feel better.     -If you notice that you have taken more than 3 extra doses in 1 week then please call and let us know. -If at any time you feel that you are retaining fluid, your medications are not working, or you feel ill in anyway, then please call us for either same day appointment or the next day, and for instructions. Our goal is to keep you out of the emergency room and the hospital and we have ways to do it. You just need to call us in a timely manner.     -If you become sick for other reasons, and notice that you are not urinating as much, the urine is very dark, you have significant diarrhea or vomiting, then please DO NOT take your water pill and CALL US immediately. 4. Return visit with Dr. Nora Delraosa in 3 months.

## 2020-01-20 NOTE — PROGRESS NOTES
(acute kidney injury) ([facility]     PAD (peripheral artery disease) ([facility]     AMS (altered mental status) 12/13/2019    Coronary artery disease involving native coronary artery of native heart without angina pectoris 12/06/2019    Transaminitis 12/01/2019    Hx of AKA (above knee amputation), left ([address] 06/28/2017    NSTEMI (non-ST elevated myocardial infarction) ([address]     Coronary artery disease due to lipid rich plaque     Acute respiratory failure with hypoxia (HCC)     Femoral-popliteal atherosclerosis ([address] 03/15/2016    Aorto-iliac atherosclerosis ([address] 03/15/2016    History of amputation below knee, right ([facility] 03/13/2014    Hypotension due to hypovolemia 01/23/2014    Ischemic cardiomyopathy      EF 40%      CAD (coronary artery disease)      Heart cath (12-11-12)  LM: 20%  LAD: mid 100%, L-L & R-L collaterals  OM2: prox 90%  RCA: mid 99% kathleen-1 flow    Consult to CTS: felt to have no good targets, not CABG candidate. Recommended medical Rx.  Mitral regurgitation     HTN (hypertension)     Depression 08/06/2012    Diabetic neuropathy ([facility] 05/04/2012    Diabetes mellitus ([facility] 04/18/2012    Hyperlipidemia with target LDL less than 70 04/18/2012     replace inactive diagnosis       Past Medical History:    1. CAD:   ? Lexiscan Stress Test: 12/9/2012: Presence of old infarct towards the inferoapical region of the LV myocardium but also with some extension into the anterior wall particularly. There is evidence for further diminished activity of the LV myocardium in the post pharmacologic images in the inferior wall in the remanent areas of activity that was still present in rest. This indicates a pharmacological induced ischemia in the areas that were not in fact in the inferior wall. Diminished ejection fraction with paradoxical motion in the apical region and hypokinesia in the inferior wall.   ? Cardiac Catheterization: 6/3/2013: Left main: No angiographically significant stenosis.  LAD: large OM2. RAMUS: - The Ramus is Absent. RCA: The % proximal occlusion and right to right and left to right colaterals. Impression: Left heart catheterization with significant multivessel disease with RCA and LAD  with collaterals and high grade OM stenosis  Recommended Treatment: CABG. CABG evaluation with LIMA-LAD and SVG-OM, and SVG-PDA  3. Echocardiogram: 12/13/2013: Ejection fraction 55%, NWMA, Stage I DD, Trace MR.  4. Echocardiogram: 2/27/2017: (Dr. Zen Jackson): Mild MR, NWMA, EF 60%. 5. TTE: 12/3/2019: [facility]: Technically difficult exam due to positioning difficult due to leg amputations. Exam indication: Abnormal Cardiac Biomarkers The left ventricle is moderately dilated. Left ventricular systolic function is mildly decreased. EF = 47 ± 5% (2D 4-ch.) Definity contrast used for endocardial   border detection. The right ventricle is normal in size. Right ventricular systolic function is   Normal. The left atrial cavity is moderately dilated. Technically very difficult study. There are no available subcostal/SSN window and the available apical window is acquired medially. No significant valvular abnormalities noted on the study The patient has not had a prior CC echocardiographic exam for comparison. 6. ECHO: 12/29/2019 [facility] Technically difficult study, Ejection fraction is visually estimated at 50%, No regional wall motion abnormalities seen, There is doppler evidence of stage II diastolic dysfunction. Normal right ventricle structure and function. Physiologic and/or trace mitral regurgitation is present. 7. HTN  8. HLD, on statin therapy  9. DM with peripheral neuropathy.              A. Hgb A1c 8.2 (6/19/2017)  10. Chronic anemia   11. PVD:              G. Right lower extremity osteomyelitis s/p R BKA (2/27/2014)              B.  History of angioplasty of the superficial femoral artery, was found to have non-healing ulcer behind left ankle, extending from the ankle up to the mid calf, with exposure grams    -Again watch your daily weight trends and if you gain water weight please follow below instructions.    -If you gain 3-5 pounds in 2-3 days OR notice that you are retaining fluid in anyway just like you did before then take an extra dose of your water pill (bumetanide/Bumex) every day until you lose the weight or feel better.     -If you notice that you have taken more than 3 extra doses in 1 week then please call and let us know. -If at any time you feel that you are retaining fluid, your medications are not working, or you feel ill in anyway, then please call us for either same day appointment or the next day, and for instructions. Our goal is to keep you out of the emergency room and the hospital and we have ways to do it. You just need to call us in a timely manner.     -If you become sick for other reasons, and notice that you are not urinating as much, the urine is very dark, you have significant diarrhea or vomiting, then please DO NOT take your water pill and CALL US immediately. 5. Return visit with Dr. Samson Angeles in 3 months.        Chuck HUYNH-CNP  Heart Failure and Pulmonary Hypertension  [facility]

## 2020-01-24 ENCOUNTER — APPOINTMENT (OUTPATIENT)
Dept: CT IMAGING | Age: 60
End: 2020-01-24
Payer: MEDICAID

## 2020-01-24 ENCOUNTER — HOSPITAL ENCOUNTER (EMERGENCY)
Age: 60
Discharge: HOME | End: 2020-01-25
Attending: EMERGENCY MEDICINE
Payer: MEDICAID

## 2020-01-24 LAB
ALBUMIN SERPL-MCNC: 3.8 G/DL (ref 3.5–5.2)
ALP BLD-CCNC: 76 U/L (ref 35–104)
ALT SERPL-CCNC: 16 U/L (ref 0–32)
ANION GAP SERPL CALCULATED.3IONS-SCNC: 11 MMOL/L (ref 7–16)
ANISOCYTOSIS: ABNORMAL
AST SERPL-CCNC: 22 U/L (ref 0–31)
BASOPHILS ABSOLUTE: 0.04 E9/L (ref 0–0.2)
BASOPHILS RELATIVE PERCENT: 0.3 % (ref 0–2)
BILIRUB SERPL-MCNC: 0.5 MG/DL (ref 0–1.2)
BUN BLDV-MCNC: 30 MG/DL (ref 8–23)
CALCIUM SERPL-MCNC: 9.1 MG/DL (ref 8.6–10.2)
CHLORIDE BLD-SCNC: 102 MMOL/L (ref 98–107)
CO2: 29 MMOL/L (ref 22–29)
CREAT SERPL-MCNC: 1.1 MG/DL (ref 0.5–1)
EOSINOPHILS ABSOLUTE: 0.17 E9/L (ref 0.05–0.5)
EOSINOPHILS RELATIVE PERCENT: 1.2 % (ref 0–6)
GFR AFRICAN AMERICAN: >60
GFR NON-AFRICAN AMERICAN: 50 ML/MIN/1.73
GLUCOSE BLD-MCNC: 304 MG/DL (ref 74–99)
HCT VFR BLD CALC: 41.3 % (ref 34–48)
HEMOGLOBIN: 12.4 G/DL (ref 11.5–15.5)
HYPOCHROMIA: ABNORMAL
IMMATURE GRANULOCYTES #: 0.04 E9/L
IMMATURE GRANULOCYTES %: 0.3 % (ref 0–5)
LACTIC ACID, SEPSIS: 1.5 MMOL/L (ref 0.5–1.9)
LYMPHOCYTES ABSOLUTE: 0.44 E9/L (ref 1.5–4)
LYMPHOCYTES RELATIVE PERCENT: 3.1 % (ref 20–42)
MCH RBC QN AUTO: 29.7 PG (ref 26–35)
MCHC RBC AUTO-ENTMCNC: 30 % (ref 32–34.5)
MCV RBC AUTO: 99 FL (ref 80–99.9)
METER GLUCOSE: 286 MG/DL (ref 74–99)
MONOCYTES ABSOLUTE: 0.94 E9/L (ref 0.1–0.95)
MONOCYTES RELATIVE PERCENT: 6.5 % (ref 2–12)
NEUTROPHILS ABSOLUTE: 12.78 E9/L (ref 1.8–7.3)
NEUTROPHILS RELATIVE PERCENT: 88.6 % (ref 43–80)
OVALOCYTES: ABNORMAL
PDW BLD-RTO: 14.7 FL (ref 11.5–15)
PLATELET # BLD: 261 E9/L (ref 130–450)
PMV BLD AUTO: 9.7 FL (ref 7–12)
POIKILOCYTES: ABNORMAL
POLYCHROMASIA: ABNORMAL
POTASSIUM SERPL-SCNC: 4.7 MMOL/L (ref 3.5–5)
RBC # BLD: 4.17 E12/L (ref 3.5–5.5)
SODIUM BLD-SCNC: 142 MMOL/L (ref 132–146)
TOTAL PROTEIN: 6.5 G/DL (ref 6.4–8.3)
WBC # BLD: 14.4 E9/L (ref 4.5–11.5)

## 2020-01-24 PROCEDURE — 83605 ASSAY OF LACTIC ACID: CPT

## 2020-01-24 PROCEDURE — 36415 COLL VENOUS BLD VENIPUNCTURE: CPT

## 2020-01-24 PROCEDURE — 85025 COMPLETE CBC W/AUTO DIFF WBC: CPT

## 2020-01-24 PROCEDURE — 99284 EMERGENCY DEPT VISIT MOD MDM: CPT

## 2020-01-24 PROCEDURE — 80053 COMPREHEN METABOLIC PANEL: CPT

## 2020-01-24 PROCEDURE — 82962 GLUCOSE BLOOD TEST: CPT

## 2020-01-24 PROCEDURE — 2580000003 HC RX 258: Performed by: RADIOLOGY

## 2020-01-24 PROCEDURE — 74177 CT ABD & PELVIS W/CONTRAST: CPT

## 2020-01-24 PROCEDURE — 6360000004 HC RX CONTRAST MEDICATION: Performed by: RADIOLOGY

## 2020-01-24 PROCEDURE — 0100U HC RESPIRPTHGN MULT REV TRANS & AMP PRB TECH 21 TRGT: CPT

## 2020-01-24 RX ORDER — SODIUM CHLORIDE 0.9 % (FLUSH) 0.9 %
10 SYRINGE (ML) INJECTION PRN
Status: DISCONTINUED | OUTPATIENT
Start: 2020-01-24 | End: 2020-01-25 | Stop reason: HOSPADM

## 2020-01-24 RX ADMIN — Medication 10 ML: at 22:40

## 2020-01-24 RX ADMIN — IOPAMIDOL 110 ML: 755 INJECTION, SOLUTION INTRAVENOUS at 22:40

## 2020-01-25 VITALS
SYSTOLIC BLOOD PRESSURE: 130 MMHG | HEIGHT: 62 IN | DIASTOLIC BLOOD PRESSURE: 48 MMHG | TEMPERATURE: 98.6 F | HEART RATE: 77 BPM | OXYGEN SATURATION: 91 % | WEIGHT: 215 LBS | BODY MASS INDEX: 39.56 KG/M2 | RESPIRATION RATE: 22 BRPM

## 2020-01-25 LAB
ADENOVIRUS BY PCR: NOT DETECTED
BACTERIA: ABNORMAL /HPF
BILIRUBIN URINE: NEGATIVE
BLOOD, URINE: NEGATIVE
BORDETELLA PARAPERTUSSIS BY PCR: NOT DETECTED
BORDETELLA PERTUSSIS BY PCR: NOT DETECTED
CHLAMYDOPHILIA PNEUMONIAE BY PCR: NOT DETECTED
CLARITY: CLEAR
COLOR: YELLOW
CORONAVIRUS 229E BY PCR: NOT DETECTED
CORONAVIRUS HKU1 BY PCR: NOT DETECTED
CORONAVIRUS NL63 BY PCR: NOT DETECTED
CORONAVIRUS OC43 BY PCR: NOT DETECTED
GLUCOSE URINE: NEGATIVE MG/DL
HUMAN METAPNEUMOVIRUS BY PCR: NOT DETECTED
HUMAN RHINOVIRUS/ENTEROVIRUS BY PCR: NOT DETECTED
INFLUENZA A BY PCR: NOT DETECTED
INFLUENZA B BY PCR: NOT DETECTED
KETONES, URINE: NEGATIVE MG/DL
LEUKOCYTE ESTERASE, URINE: ABNORMAL
MYCOPLASMA PNEUMONIAE BY PCR: NOT DETECTED
NITRITE, URINE: NEGATIVE
PARAINFLUENZA VIRUS 1 BY PCR: NOT DETECTED
PARAINFLUENZA VIRUS 2 BY PCR: NOT DETECTED
PARAINFLUENZA VIRUS 3 BY PCR: NOT DETECTED
PARAINFLUENZA VIRUS 4 BY PCR: NOT DETECTED
PH UA: 5 (ref 5–9)
PROTEIN UA: NEGATIVE MG/DL
RBC UA: ABNORMAL /HPF (ref 0–2)
RESPIRATORY SYNCYTIAL VIRUS BY PCR: NOT DETECTED
SPECIFIC GRAVITY UA: 1.02 (ref 1–1.03)
UROBILINOGEN, URINE: 0.2 E.U./DL
WBC UA: >20 /HPF (ref 0–5)

## 2020-01-25 PROCEDURE — 87088 URINE BACTERIA CULTURE: CPT

## 2020-01-25 PROCEDURE — 81001 URINALYSIS AUTO W/SCOPE: CPT

## 2020-01-25 PROCEDURE — 87186 SC STD MICRODIL/AGAR DIL: CPT

## 2020-01-25 PROCEDURE — 6370000000 HC RX 637 (ALT 250 FOR IP): Performed by: EMERGENCY MEDICINE

## 2020-01-25 RX ORDER — CEFDINIR 300 MG/1
300 CAPSULE ORAL 2 TIMES DAILY
Qty: 14 CAPSULE | Refills: 0 | Status: SHIPPED | OUTPATIENT
Start: 2020-01-25 | End: 2020-02-01

## 2020-01-25 RX ORDER — CEFDINIR 300 MG/1
300 CAPSULE ORAL ONCE
Status: COMPLETED | OUTPATIENT
Start: 2020-01-25 | End: 2020-01-25

## 2020-01-25 RX ADMIN — CEFDINIR 300 MG: 300 CAPSULE ORAL at 02:21

## 2020-01-25 NOTE — ED PROVIDER NOTES
Medical Decision Making: Gavin Hernandez presents to the ED for low back pain and chills. On exam abdomen is distended. Lab work shows chronic kidney disease, stable. Patient also has hyperglycemia with no evidence of DKA. Mild leukocytosis with a white count of 14.4. This is most likely associated with a urinary tract infection given the patient's urinalysis. No evidence of any renal calculi or pyelonephritis on CT abdomen pelvis. Furthermore, no acute pathology seen on CT. Patient given her first dose of antibiotics here in the ED and discharged home with a course of [address] All questions answered. Return indications and follow up discussed. Patient agreeable with the plan and discharge. Counseling: The emergency provider has spoken with the patient and discussed todays results, in addition to providing specific details for the plan of care and counseling regarding the diagnosis and prognosis. Questions are answered at this time and they are agreeable with the plan.      --------------------------------- IMPRESSION AND DISPOSITION ---------------------------------    IMPRESSION  1. Urinary tract infection in female    2. Diabetes mellitus due to underlying condition with hyperglycemia, unspecified whether long term insulin use ([facility]    3. Chronic kidney disease, unspecified CKD stage        DISPOSITION  Disposition: Discharge to nursing home  Patient condition is fair      NOTE: This report was transcribed using voice recognition software.  Every effort was made to ensure accuracy; however, inadvertent computerized transcription errors may be present        Cristy Yeboah MD  01/25/20 0122

## 2020-01-27 ENCOUNTER — HOSPITAL ENCOUNTER (OUTPATIENT)
Dept: OTHER | Age: 60
Setting detail: THERAPIES SERIES
Discharge: HOME | End: 2020-01-27
Payer: MEDICAID

## 2020-01-27 LAB
ORGANISM: ABNORMAL
URINE CULTURE, ROUTINE: ABNORMAL

## 2020-01-28 ENCOUNTER — TELEPHONE (OUTPATIENT)
Dept: GERIATRIC MEDICINE | Age: 60
End: 2020-01-28

## 2020-01-28 NOTE — TELEPHONE ENCOUNTER
Rcvd a call from [facility] home care. Pt had been hospitalized, states Dr Martínez Simpson had been the attending, and Dr Tony Tanner the resident, & home health was ordered. Certified for home health 1/4/20 to 3/3/20  for nursing visits, CPA, med compliance and an aid. Looking for a DR who will cover any needed orders. Home care nurse states she called the clinic, was told that since pt no showed twice they would not follow for orders. Pt needs to re-establish at the clinic. Home care now looking to see if Dr Martínez Simpson would cover the orders. Spoke with Kaye Azevedo, nursing at the [facility], informed of the above info. She will check to see what options are available. Provided her with Finn Mays, [facility] [employer]. name & phone #. Latrel notified of above.

## 2020-01-28 NOTE — TELEPHONE ENCOUNTER
Diane from [facility] called back. States they request Dr Debby Nye sign the initial Plan of Care for home care, and they will work on  getting pt re-established with them. Cris from [address] OF [facility]. notified and will fax us the Plan of Care. Cris asked if Dr Debby Nye would be willing to give any orders needed until re-established with [facility]. Dr Debby Nye was agreeable -- OK through 3/3/20 if needed. If pt not re-established by then, requested home care call us back for further instructions. Left message for [facility] in [facility] that Dr Debby Nye will sign the initial Plan of Care, requested they get pt re-established in the [facility] as soon as possible.

## 2020-01-29 ENCOUNTER — TELEPHONE (OUTPATIENT)
Dept: INTERNAL MEDICINE | Age: 60
End: 2020-01-29

## 2020-02-06 ENCOUNTER — OFFICE VISIT (OUTPATIENT)
Dept: INTERNAL MEDICINE | Age: 60
End: 2020-02-06
Payer: MEDICAID

## 2020-02-06 VITALS
RESPIRATION RATE: 22 BRPM | OXYGEN SATURATION: 97 % | DIASTOLIC BLOOD PRESSURE: 57 MMHG | SYSTOLIC BLOOD PRESSURE: 122 MMHG | TEMPERATURE: 97.7 F | HEART RATE: 68 BPM

## 2020-02-06 PROCEDURE — G8427 DOCREV CUR MEDS BY ELIG CLIN: HCPCS | Performed by: INTERNAL MEDICINE

## 2020-02-06 PROCEDURE — 99213 OFFICE O/P EST LOW 20 MIN: CPT | Performed by: INTERNAL MEDICINE

## 2020-02-06 PROCEDURE — G8484 FLU IMMUNIZE NO ADMIN: HCPCS | Performed by: INTERNAL MEDICINE

## 2020-02-06 PROCEDURE — 99212 OFFICE O/P EST SF 10 MIN: CPT | Performed by: INTERNAL MEDICINE

## 2020-02-06 PROCEDURE — 3017F COLORECTAL CA SCREEN DOC REV: CPT | Performed by: INTERNAL MEDICINE

## 2020-02-06 PROCEDURE — G8417 CALC BMI ABV UP PARAM F/U: HCPCS | Performed by: INTERNAL MEDICINE

## 2020-02-06 PROCEDURE — 1036F TOBACCO NON-USER: CPT | Performed by: INTERNAL MEDICINE

## 2020-02-06 ASSESSMENT — ENCOUNTER SYMPTOMS
VOMITING: 0
DIARRHEA: 0
SORE THROAT: 0
COUGH: 0
NAUSEA: 0
ABDOMINAL PAIN: 0
SHORTNESS OF BREATH: 1

## 2020-02-06 NOTE — PROGRESS NOTES
Reviewed discharge instructions with patient, she was handed a prescription for commode to take to a home medical company to get, pt requested a rx for portable oxygen concentrator as she was leaving, she wants this faxed to [facility] which is the company she currently has oxygen through.

## 2020-02-06 NOTE — PATIENT INSTRUCTIONS
want or don't want. Make sure this person understands your values and wishes. · You may decide to try life-supporting treatments for a limited time. This can help your doctor see if they will help. You may also decide that you want your doctor to do only certain things to keep you alive. It's important to be very clear about what you do and don't want. · Ask your doctor for an estimate of how long you may live. Your doctor may not always know. But he or she can tell you what usually happens with heart failure. Which papers should you prepare? Advance directives are legal papers that tell doctors how to care for you at the end of your life. They may include a living will and a durable power of . You don't need a  to write these papers. If you prepare these papers, be sure to give a copy to your doctor. It's also important to give a copy to a family member or close friend. · Consider a do-not-resuscitate order (DNR). This order asks that no extra treatments be done if your heart stops or you stop breathing. Extra treatments may include cardiopulmonary resuscitation (CPR), electrical shock to restart your heart, or a machine to breathe for you. If you decide to have a DNR order, ask your doctor to explain and write it. Place the order in your home where everyone can easily see it. · Think about a living will. It explains your wishes in case you are in a coma or cannot communicate. Living wolf tell doctors to use or not use treatments to keep you alive. You must have one or two witnesses or a notary in the room when you sign this form. · Think about naming a person to make decisions about your care if you are not able to. This paper is called a durable power of . Most people ask a close friend or family member. · Ask your doctor or your state health department about advance directives. They may have forms for each of these types of papers. · All of these papers are simple to change.  Tell your doctor what you want to change. Ask him or her to make a note in your file. Then give your family updated copies. Where can you learn more? Go to [email or web address].[email or web address] org and sign in to your [employer] account. Enter D414 in the [facility] box to learn more about \"Advance Care Planning for Heart Failure: Care Instructions. \"     If you do not have an account, please click on the \"Sign Up Now\" link. Current as of: April 9, 2019  Content Version: 12.3  © 1541-6916 Healthwise, Incorporated. Care instructions adapted under license by [address] If you have questions about a medical condition or this instruction, always ask your healthcare professional. [address] any warranty or liability for your use of this information.

## 2020-02-07 ENCOUNTER — TELEPHONE (OUTPATIENT)
Dept: INTERNAL MEDICINE | Age: 60
End: 2020-02-07

## 2020-02-07 NOTE — TELEPHONE ENCOUNTER
Micki Daniels 97 at 12:53. They received script for portable O2 concentrator. A new script needs faxed with the NPI of ordering doctor and a setting which is number from 1-5 no letters after it. A script for evaluation for concentrator can be sent with the saturation % to maintain. Berta Cools can be reached at [address] ext [phone]. Fax [phone].

## 2020-02-17 RX ORDER — LISINOPRIL 5 MG/1
TABLET ORAL
Qty: 30 TABLET | Refills: 2 | Status: SHIPPED | OUTPATIENT
Start: 2020-02-17 | End: 2020-06-15 | Stop reason: SDUPTHER

## 2020-02-17 RX ORDER — ASPIRIN 81 MG/1
TABLET, COATED ORAL
Qty: 30 TABLET | Refills: 2 | Status: SHIPPED
Start: 2020-02-17 | End: 2020-06-12 | Stop reason: SDUPTHER

## 2020-03-10 LAB — DIABETIC RETINOPATHY: POSITIVE

## 2020-03-17 RX ORDER — ISOSORBIDE MONONITRATE 60 MG/1
TABLET, EXTENDED RELEASE ORAL
Qty: 30 TABLET | Refills: 1 | Status: SHIPPED | OUTPATIENT
Start: 2020-03-17 | End: 2020-06-15 | Stop reason: SDUPTHER

## 2020-03-30 ENCOUNTER — TELEPHONE (OUTPATIENT)
Dept: INTERNAL MEDICINE | Age: 60
End: 2020-03-30

## 2020-03-30 RX ORDER — BLOOD SUGAR DIAGNOSTIC
STRIP MISCELLANEOUS
Qty: 100 EACH | Refills: 2 | Status: SHIPPED | OUTPATIENT
Start: 2020-03-30

## 2020-03-30 NOTE — TELEPHONE ENCOUNTER
Last Appointment:  Visit date not found  Future Appointments   Date Time Provider Kirk Bautista   5/6/2020 10:30 AM Abiola Carreno MD [facility] None

## 2020-04-02 ENCOUNTER — TELEPHONE (OUTPATIENT)
Dept: INTERNAL MEDICINE | Age: 60
End: 2020-04-02

## 2020-05-04 ENCOUNTER — TELEPHONE (OUTPATIENT)
Dept: CARDIOLOGY CLINIC | Age: 60
End: 2020-05-04

## 2020-05-13 RX ORDER — BUMETANIDE 1 MG/1
1 TABLET ORAL DAILY
Qty: 30 TABLET | Refills: 1 | Status: SHIPPED | OUTPATIENT
Start: 2020-05-13 | End: 2020-06-15 | Stop reason: SDUPTHER

## 2020-05-13 RX ORDER — ISOSORBIDE MONONITRATE 60 MG/1
TABLET, EXTENDED RELEASE ORAL
Qty: 30 TABLET | Refills: 1 | OUTPATIENT
Start: 2020-05-13

## 2020-05-13 RX ORDER — FENOFIBRATE 160 MG/1
TABLET ORAL
Qty: 30 TABLET | Refills: 3 | OUTPATIENT
Start: 2020-05-13

## 2020-05-13 RX ORDER — BUMETANIDE 1 MG/1
TABLET ORAL
Qty: 30 TABLET | Refills: 3 | OUTPATIENT
Start: 2020-05-13

## 2020-06-12 ENCOUNTER — TELEPHONE (OUTPATIENT)
Dept: INTERNAL MEDICINE | Age: 60
End: 2020-06-12

## 2020-06-12 RX ORDER — ASPIRIN 81 MG/1
TABLET, COATED ORAL
Qty: 30 TABLET | Refills: 0 | Status: SHIPPED | OUTPATIENT
Start: 2020-06-12 | End: 2020-06-15 | Stop reason: SDUPTHER

## 2020-06-12 NOTE — TELEPHONE ENCOUNTER
[id number] [facility]    Pre-visit planning call to discuss patient care during COVID-19 pandemic. Left a voicemail for patient to call back.     Geetha Coelho MD  6/12/20

## 2020-06-15 ENCOUNTER — OFFICE VISIT (OUTPATIENT)
Dept: INTERNAL MEDICINE | Age: 60
End: 2020-06-15
Payer: MEDICAID

## 2020-06-15 ENCOUNTER — HOSPITAL ENCOUNTER (OUTPATIENT)
Age: 60
Discharge: HOME | End: 2020-06-15
Payer: MEDICAID

## 2020-06-15 VITALS
HEART RATE: 72 BPM | DIASTOLIC BLOOD PRESSURE: 57 MMHG | OXYGEN SATURATION: 97 % | TEMPERATURE: 98.2 F | RESPIRATION RATE: 16 BRPM | SYSTOLIC BLOOD PRESSURE: 110 MMHG

## 2020-06-15 LAB
ALBUMIN SERPL-MCNC: 3.9 G/DL (ref 3.5–5.2)
ALP BLD-CCNC: 107 U/L (ref 35–104)
ALT SERPL-CCNC: 35 U/L (ref 0–32)
ANION GAP SERPL CALCULATED.3IONS-SCNC: 9 MMOL/L (ref 7–16)
AST SERPL-CCNC: 23 U/L (ref 0–31)
BILIRUB SERPL-MCNC: 0.2 MG/DL (ref 0–1.2)
BUN BLDV-MCNC: 45 MG/DL (ref 8–23)
CALCIUM SERPL-MCNC: 9.7 MG/DL (ref 8.6–10.2)
CHLORIDE BLD-SCNC: 106 MMOL/L (ref 98–107)
CO2: 31 MMOL/L (ref 22–29)
CREAT SERPL-MCNC: 1.2 MG/DL (ref 0.5–1)
GFR AFRICAN AMERICAN: 55
GFR NON-AFRICAN AMERICAN: 46 ML/MIN/1.73
GLUCOSE BLD-MCNC: 297 MG/DL (ref 74–99)
HBA1C MFR BLD: 10.7 % (ref 4–5.6)
POTASSIUM SERPL-SCNC: 5.2 MMOL/L (ref 3.5–5)
SODIUM BLD-SCNC: 146 MMOL/L (ref 132–146)
TOTAL PROTEIN: 6.5 G/DL (ref 6.4–8.3)

## 2020-06-15 PROCEDURE — G8417 CALC BMI ABV UP PARAM F/U: HCPCS | Performed by: INTERNAL MEDICINE

## 2020-06-15 PROCEDURE — 83036 HEMOGLOBIN GLYCOSYLATED A1C: CPT

## 2020-06-15 PROCEDURE — 3017F COLORECTAL CA SCREEN DOC REV: CPT | Performed by: INTERNAL MEDICINE

## 2020-06-15 PROCEDURE — 3046F HEMOGLOBIN A1C LEVEL >9.0%: CPT | Performed by: INTERNAL MEDICINE

## 2020-06-15 PROCEDURE — 2022F DILAT RTA XM EVC RTNOPTHY: CPT | Performed by: INTERNAL MEDICINE

## 2020-06-15 PROCEDURE — 36415 COLL VENOUS BLD VENIPUNCTURE: CPT

## 2020-06-15 PROCEDURE — 99213 OFFICE O/P EST LOW 20 MIN: CPT | Performed by: INTERNAL MEDICINE

## 2020-06-15 PROCEDURE — 80053 COMPREHEN METABOLIC PANEL: CPT

## 2020-06-15 PROCEDURE — 1036F TOBACCO NON-USER: CPT | Performed by: INTERNAL MEDICINE

## 2020-06-15 PROCEDURE — G8427 DOCREV CUR MEDS BY ELIG CLIN: HCPCS | Performed by: INTERNAL MEDICINE

## 2020-06-15 PROCEDURE — 99215 OFFICE O/P EST HI 40 MIN: CPT | Performed by: INTERNAL MEDICINE

## 2020-06-15 RX ORDER — METOPROLOL TARTRATE 100 MG/1
100 TABLET ORAL 2 TIMES DAILY
Qty: 60 TABLET | Refills: 3 | Status: SHIPPED
Start: 2020-06-15 | End: 2020-10-05

## 2020-06-15 RX ORDER — ATORVASTATIN CALCIUM 40 MG/1
40 TABLET, FILM COATED ORAL DAILY
Qty: 30 TABLET | Refills: 3 | Status: SHIPPED
Start: 2020-06-15 | End: 2020-10-23 | Stop reason: SDUPTHER

## 2020-06-15 RX ORDER — SERTRALINE HYDROCHLORIDE 100 MG/1
100 TABLET, FILM COATED ORAL DAILY
Qty: 30 TABLET | Refills: 3 | Status: SHIPPED
Start: 2020-06-15 | End: 2020-10-05

## 2020-06-15 RX ORDER — PANTOPRAZOLE SODIUM 40 MG/1
40 TABLET, DELAYED RELEASE ORAL
Qty: 30 TABLET | Refills: 3 | Status: SHIPPED
Start: 2020-06-15 | End: 2020-10-05

## 2020-06-15 RX ORDER — CLOPIDOGREL BISULFATE 75 MG/1
75 TABLET ORAL DAILY
Qty: 30 TABLET | Refills: 3 | Status: SHIPPED
Start: 2020-06-15 | End: 2020-09-28 | Stop reason: SDUPTHER

## 2020-06-15 RX ORDER — FOLIC ACID 1 MG/1
1 TABLET ORAL DAILY
Qty: 30 TABLET | Refills: 3 | Status: SHIPPED
Start: 2020-06-15 | End: 2020-10-05

## 2020-06-15 RX ORDER — ISOSORBIDE MONONITRATE 60 MG/1
TABLET, EXTENDED RELEASE ORAL
Qty: 30 TABLET | Refills: 3 | Status: SHIPPED
Start: 2020-06-15 | End: 2020-10-05

## 2020-06-15 RX ORDER — ASPIRIN 81 MG/1
81 TABLET ORAL DAILY
Qty: 30 TABLET | Refills: 3 | Status: SHIPPED
Start: 2020-06-15 | End: 2020-11-02

## 2020-06-15 RX ORDER — DOCUSATE SODIUM 100 MG/1
100 CAPSULE, LIQUID FILLED ORAL DAILY PRN
Qty: 30 CAPSULE | Refills: 3 | Status: SHIPPED
Start: 2020-06-15 | End: 2020-10-23 | Stop reason: SDUPTHER

## 2020-06-15 RX ORDER — RANOLAZINE 1000 MG/1
1000 TABLET, EXTENDED RELEASE ORAL 2 TIMES DAILY
Qty: 60 TABLET | Refills: 3 | Status: SHIPPED
Start: 2020-06-15 | End: 2020-10-05

## 2020-06-15 RX ORDER — NITROGLYCERIN 0.4 MG/1
0.4 TABLET SUBLINGUAL EVERY 5 MIN PRN
Qty: 25 TABLET | Refills: 0 | Status: SHIPPED
Start: 2020-06-15 | End: 2020-10-23 | Stop reason: SDUPTHER

## 2020-06-15 RX ORDER — AMLODIPINE BESYLATE 2.5 MG/1
2.5 TABLET ORAL DAILY
Qty: 30 TABLET | Refills: 3 | Status: SHIPPED
Start: 2020-06-15 | End: 2020-10-12

## 2020-06-15 RX ORDER — BUMETANIDE 1 MG/1
1 TABLET ORAL DAILY
Qty: 30 TABLET | Refills: 1 | Status: SHIPPED
Start: 2020-06-15 | End: 2020-08-07

## 2020-06-15 RX ORDER — LISINOPRIL 5 MG/1
5 TABLET ORAL DAILY
Qty: 30 TABLET | Refills: 2 | Status: SHIPPED
Start: 2020-06-15 | End: 2020-08-07 | Stop reason: SDUPTHER

## 2020-06-15 NOTE — PROGRESS NOTES
[address]  Internal Medicine Residency Program  [facility] Note      SUBJECTIVE:  CC: had concerns including Established New Doctor and Medication Refill. Diana Brewer presented to the [facility] for a routine visit  She reports intermittent episodes of chest discomfort for which she has been using sublingual nitroglycerine 4 times in a month, last time she used was yesterday. She ran out of ranolazine and Metoprolol for a month. Last hospital admission was in December due to CHF exacerbation. She was discharged with 4L oxygen. Her oxygen saturation at home is >95% and has only been using oxygen as needed. Last time used oxygen was one month ago. Denies any nausea, vomiting, headache, dizziness, loss of appetitie. Review Of Systems:  General: no fevers, chills, weight loss or gain.    Ears/Nose/Throat: no hearing loss, tinnitus, vertigo, nosebleed, nasal congestion, rhinorrhea, sore throat  Respiratory: no cough, pleuritic chest pain, dyspnea, or wheezing  Cardiovascular: no chest pain, angina, dyspnea on exertion, orthopnea, PND, palpitations, or claudication  Gastrointestinal: no nausea, vomiting, heartburn, diarrhea, constipation, abdominal pain, hematochezia or melena  Genitourinary: no urinary urgency, frequency, dysuria, nocturia, hesitancy, or incontinence  Musculoskeletal: no arthritis, arthralgia, myalgia, weakness, or morning stiffness  Skin: no abnormal pigmentation, rash, itching, masses, hair or nail changes    Outpatient Medications Marked as Taking for the 6/15/20 encounter (Office Visit) with Alina Hull MD   Medication Sig Dispense Refill    aspirin (ASPIRIN LOW DOSE) 81 MG EC tablet Take 1 tablet by mouth daily 30 tablet 3    bumetanide (BUMEX) 1 MG tablet Take 1 tablet by mouth daily 30 tablet 1    isosorbide mononitrate (IMDUR) 60 MG extended release tablet Take one tablet 60 mg daily 30 tablet 3    lisinopril (PRINIVIL;ZESTRIL) 5 MG tablet Take 1 tablet by mouth daily 30 tablet 2    blood glucose test strips (ONE TOUCH ULTRA TEST) strip use TEST STRIP four times a day 100 strip 3    atorvastatin (LIPITOR) 40 MG tablet Take 1 tablet by mouth daily 30 tablet 3    docusate sodium (COLACE) 100 MG capsule Take 1 capsule by mouth daily as needed for Constipation 30 capsule 3    insulin lispro (HUMALOG) 100 UNIT/ML injection vial Please take 18U three times daily with meals (before meals) 3 vial 3    nitroGLYCERIN (NITROSTAT) 0.4 MG SL tablet Place 1 tablet under the tongue every 5 minutes as needed for Chest pain 25 tablet 0    sertraline (ZOLOFT) 100 MG tablet Take 1 tablet by mouth daily 30 tablet 3    metoprolol (LOPRESSOR) 100 MG tablet Take 1 tablet by mouth 2 times daily 60 tablet 3    clopidogrel (PLAVIX) 75 MG tablet Take 1 tablet by mouth daily 30 tablet 3    amLODIPine (NORVASC) 2.5 MG tablet Take 1 tablet by mouth daily 30 tablet 3    ranolazine (RANEXA) 1000 MG extended release tablet Take 1 tablet by mouth 2 times daily 60 tablet 3    pantoprazole (PROTONIX) 40 MG tablet Take 1 tablet by mouth every morning (before breakfast) 30 tablet 3    folic acid (FOLVITE) 1 MG tablet Take 1 tablet by mouth daily 30 tablet 3    metFORMIN (GLUCOPHAGE) 500 MG tablet Take 1 tablet by mouth 2 times daily (with meals) 180 tablet 1    gabapentin (NEURONTIN) 600 MG tablet Take 1 tablet by mouth 3 times daily for 90 days. 90 tablet 2    [START ON 7/19/2020] HYDROcodone-acetaminophen (NORCO) 5-325 MG per tablet Take 1 tablet by mouth every 8 hours as needed for Pain for up to 30 days.  90 tablet 0    Insulin Syringe-Needle U-100 (BD INSULIN SYRINGE U/F) 31G X 5/16\" 1 ML MISC use to inject INSULIN UNDER THE SKIN four times a day 100 each 2    OXYGEN Inhale 2.5 L into the lungs continuous As needed      Blood Glucose Monitoring Suppl (BLOOD GLUCOSE MONITOR SYSTEM) w/Device KIT 1 each by Other route 4 times daily One Touch Meter 1 kit 0    insulin glargine (LANTUS SOLOSTAR) 100 every other day as tolerated    PVD s/p right BKA and left AKA  Cont medication above    Depression  On  Sertraline    I have reviewed my findings and recommendations with Rossi Valadez and Dr Kobe Garcia MD PGY-1   6/15/2020 4:19 PM

## 2020-06-16 ENCOUNTER — TELEPHONE (OUTPATIENT)
Dept: INTERNAL MEDICINE | Age: 60
End: 2020-06-16

## 2020-06-17 RX ORDER — INSULIN GLARGINE 100 [IU]/ML
75 INJECTION, SOLUTION SUBCUTANEOUS NIGHTLY
Qty: 5 PEN | Refills: 3
Start: 2020-06-17 | End: 2020-10-23 | Stop reason: SDUPTHER

## 2020-06-17 NOTE — TELEPHONE ENCOUNTER
With patient the results of her labs. A1c increased to 10.7%. We will increase Lantus to 75 units nightly and continue with pre-meal 18 units plus sliding scale along with metformin 500 twice daily that was initiated on 6/15/2020. Instructed to get BMP done prior to virtual visit on 06/29/20 to follow potassium 5.2.     Electronically signed by Jess Aiken DO on 6/17/2020 at 9:34 AM

## 2020-08-07 RX ORDER — LISINOPRIL 5 MG/1
5 TABLET ORAL DAILY
Qty: 30 TABLET | Refills: 0 | Status: SHIPPED
Start: 2020-08-07 | End: 2020-08-24

## 2020-08-07 RX ORDER — BUMETANIDE 1 MG/1
TABLET ORAL
Qty: 30 TABLET | Refills: 1 | Status: SHIPPED
Start: 2020-08-07 | End: 2020-10-05 | Stop reason: SDUPTHER

## 2020-08-07 NOTE — TELEPHONE ENCOUNTER
Last Appointment:  6/15/2020  Future Appointments   Date Time Provider Kirk Bautista   8/12/2020 11:00 AM Ev Zeng MD [facility] None   8/24/2020  1:00 PM Jennifer Martinez MD [address] [place] [address]

## 2020-08-07 NOTE — TELEPHONE ENCOUNTER
Last Appointment:  6/15/2020  Future Appointments   Date Time Provider Kirk Bautista   8/12/2020 11:00 AM Linnette Bosworth, MD [facility] None   8/24/2020  1:00 PM Tosha Beltre MD [address] [place] [address]

## 2020-08-15 ENCOUNTER — APPOINTMENT (OUTPATIENT)
Dept: GENERAL RADIOLOGY | Age: 60
DRG: 469 | End: 2020-08-15
Payer: MEDICAID

## 2020-08-15 ENCOUNTER — HOSPITAL ENCOUNTER (INPATIENT)
Age: 60
LOS: 4 days | Discharge: HOME | DRG: 469 | End: 2020-08-19
Attending: EMERGENCY MEDICINE | Admitting: INTERNAL MEDICINE
Payer: MEDICAID

## 2020-08-15 ENCOUNTER — APPOINTMENT (OUTPATIENT)
Dept: CT IMAGING | Age: 60
DRG: 469 | End: 2020-08-15
Payer: MEDICAID

## 2020-08-15 LAB
ACETAMINOPHEN LEVEL: <5 MCG/ML (ref 10–30)
ALBUMIN SERPL-MCNC: 3.3 G/DL (ref 3.5–5.2)
ALBUMIN SERPL-MCNC: 3.9 G/DL (ref 3.5–5.2)
ALP BLD-CCNC: 60 U/L (ref 35–104)
ALP BLD-CCNC: 79 U/L (ref 35–104)
ALT SERPL-CCNC: 15 U/L (ref 0–32)
ALT SERPL-CCNC: 18 U/L (ref 0–32)
AMMONIA: 19 UMOL/L (ref 11–51)
ANION GAP SERPL CALCULATED.3IONS-SCNC: 11 MMOL/L (ref 7–16)
ANION GAP SERPL CALCULATED.3IONS-SCNC: 12 MMOL/L (ref 7–16)
ANION GAP SERPL CALCULATED.3IONS-SCNC: 9 MMOL/L (ref 7–16)
AST SERPL-CCNC: 12 U/L (ref 0–31)
AST SERPL-CCNC: 14 U/L (ref 0–31)
B.E.: -2.3 MMOL/L (ref -3–3)
BACTERIA: ABNORMAL /HPF
BASOPHILS ABSOLUTE: 0.04 E9/L (ref 0–0.2)
BASOPHILS RELATIVE PERCENT: 0.5 % (ref 0–2)
BILIRUB SERPL-MCNC: 0.3 MG/DL (ref 0–1.2)
BILIRUB SERPL-MCNC: 0.3 MG/DL (ref 0–1.2)
BILIRUBIN DIRECT: <0.2 MG/DL (ref 0–0.3)
BILIRUBIN URINE: ABNORMAL
BILIRUBIN, INDIRECT: ABNORMAL MG/DL (ref 0–1)
BLOOD, URINE: NEGATIVE
BUN BLDV-MCNC: 45 MG/DL (ref 8–23)
BUN BLDV-MCNC: 46 MG/DL (ref 8–23)
BUN BLDV-MCNC: 47 MG/DL (ref 8–23)
C-REACTIVE PROTEIN: 0.3 MG/DL (ref 0–0.4)
CALCIUM SERPL-MCNC: 9 MG/DL (ref 8.6–10.2)
CALCIUM SERPL-MCNC: 9 MG/DL (ref 8.6–10.2)
CALCIUM SERPL-MCNC: 9.8 MG/DL (ref 8.6–10.2)
CHLORIDE BLD-SCNC: 103 MMOL/L (ref 98–107)
CHLORIDE BLD-SCNC: 105 MMOL/L (ref 98–107)
CHLORIDE BLD-SCNC: 106 MMOL/L (ref 98–107)
CHP ED QC CHECK: NORMAL
CK MB: 2.7 NG/ML (ref 0–4.3)
CLARITY: CLEAR
CO2: 24 MMOL/L (ref 22–29)
CO2: 25 MMOL/L (ref 22–29)
CO2: 26 MMOL/L (ref 22–29)
COHB: 1.1 % (ref 0–1.5)
COLOR: ABNORMAL
CREAT SERPL-MCNC: 1.5 MG/DL (ref 0.5–1)
CREAT SERPL-MCNC: 1.5 MG/DL (ref 0.5–1)
CREAT SERPL-MCNC: 1.7 MG/DL (ref 0.5–1)
CRITICAL: ABNORMAL
DATE ANALYZED: ABNORMAL
DATE OF COLLECTION: ABNORMAL
EOSINOPHILS ABSOLUTE: 0.22 E9/L (ref 0.05–0.5)
EOSINOPHILS RELATIVE PERCENT: 2.5 % (ref 0–6)
EPITHELIAL CELLS, UA: ABNORMAL /HPF
ETHANOL: <10 MG/DL (ref 0–0.08)
GFR AFRICAN AMERICAN: 37
GFR AFRICAN AMERICAN: 43
GFR AFRICAN AMERICAN: 43
GFR NON-AFRICAN AMERICAN: 30 ML/MIN/1.73
GFR NON-AFRICAN AMERICAN: 35 ML/MIN/1.73
GFR NON-AFRICAN AMERICAN: 35 ML/MIN/1.73
GLUCOSE BLD-MCNC: 205 MG/DL (ref 74–99)
GLUCOSE BLD-MCNC: 207 MG/DL
GLUCOSE BLD-MCNC: 227 MG/DL (ref 74–99)
GLUCOSE BLD-MCNC: 281 MG/DL (ref 74–99)
GLUCOSE URINE: NEGATIVE MG/DL
HCO3: 25.7 MMOL/L (ref 22–26)
HCT VFR BLD CALC: 40.8 % (ref 34–48)
HEMOGLOBIN: 12.3 G/DL (ref 11.5–15.5)
HHB: 9.4 % (ref 0–5)
HYALINE CASTS: ABNORMAL /LPF (ref 0–2)
IMMATURE GRANULOCYTES #: 0.05 E9/L
IMMATURE GRANULOCYTES %: 0.6 % (ref 0–5)
KETONES, URINE: ABNORMAL MG/DL
LAB: ABNORMAL
LACTIC ACID: 1.7 MMOL/L (ref 0.5–2.2)
LEUKOCYTE ESTERASE, URINE: ABNORMAL
LYMPHOCYTES ABSOLUTE: 1.2 E9/L (ref 1.5–4)
LYMPHOCYTES RELATIVE PERCENT: 13.6 % (ref 20–42)
Lab: ABNORMAL
MAGNESIUM: 1.5 MG/DL (ref 1.6–2.6)
MCH RBC QN AUTO: 30.8 PG (ref 26–35)
MCHC RBC AUTO-ENTMCNC: 30.1 % (ref 32–34.5)
MCV RBC AUTO: 102 FL (ref 80–99.9)
METER GLUCOSE: 199 MG/DL (ref 74–99)
METER GLUCOSE: 206 MG/DL (ref 74–99)
METER GLUCOSE: 207 MG/DL (ref 74–99)
METHB: 0.1 % (ref 0–1.5)
MODE: ABNORMAL
MONOCYTES ABSOLUTE: 0.65 E9/L (ref 0.1–0.95)
MONOCYTES RELATIVE PERCENT: 7.4 % (ref 2–12)
NEUTROPHILS ABSOLUTE: 6.65 E9/L (ref 1.8–7.3)
NEUTROPHILS RELATIVE PERCENT: 75.4 % (ref 43–80)
NITRITE, URINE: POSITIVE
O2 CONTENT: 17.5 ML/DL
O2 SATURATION: 90.5 % (ref 92–98.5)
O2HB: 89.4 % (ref 94–97)
OPERATOR ID: 187
PATIENT TEMP: 37 C
PCO2: 58.1 MMHG (ref 35–45)
PDW BLD-RTO: 13.9 FL (ref 11.5–15)
PH BLOOD GAS: 7.26 (ref 7.35–7.45)
PH UA: 5 (ref 5–9)
PLATELET # BLD: 206 E9/L (ref 130–450)
PMV BLD AUTO: 9.6 FL (ref 7–12)
PO2: 61.5 MMHG (ref 75–100)
POTASSIUM REFLEX MAGNESIUM: 6.6 MMOL/L (ref 3.5–5)
POTASSIUM SERPL-SCNC: 5.4 MMOL/L (ref 3.5–5)
POTASSIUM SERPL-SCNC: 6.2 MMOL/L (ref 3.5–5)
PRO-BNP: 1420 PG/ML (ref 0–125)
PROCALCITONIN: 0.05 NG/ML (ref 0–0.08)
PROTEIN UA: NEGATIVE MG/DL
RBC # BLD: 4 E12/L (ref 3.5–5.5)
RBC UA: ABNORMAL /HPF (ref 0–2)
SALICYLATE, SERUM: <0.3 MG/DL (ref 0–30)
SODIUM BLD-SCNC: 138 MMOL/L (ref 132–146)
SODIUM BLD-SCNC: 141 MMOL/L (ref 132–146)
SODIUM BLD-SCNC: 142 MMOL/L (ref 132–146)
SOURCE, BLOOD GAS: ABNORMAL
SPECIFIC GRAVITY UA: >=1.03 (ref 1–1.03)
THB: 13.9 G/DL (ref 11.5–16.5)
TIME ANALYZED: 1943
TOTAL CK: 67 U/L (ref 20–180)
TOTAL PROTEIN: 5.6 G/DL (ref 6.4–8.3)
TOTAL PROTEIN: 6.4 G/DL (ref 6.4–8.3)
TRICYCLIC ANTIDEPRESSANTS SCREEN SERUM: NEGATIVE NG/ML
TROPONIN: <0.01 NG/ML (ref 0–0.03)
TROPONIN: <0.01 NG/ML (ref 0–0.03)
UROBILINOGEN, URINE: 0.2 E.U./DL
WBC # BLD: 8.8 E9/L (ref 4.5–11.5)
WBC UA: ABNORMAL /HPF (ref 0–5)

## 2020-08-15 PROCEDURE — 99284 EMERGENCY DEPT VISIT MOD MDM: CPT

## 2020-08-15 PROCEDURE — 82140 ASSAY OF AMMONIA: CPT

## 2020-08-15 PROCEDURE — 82805 BLOOD GASES W/O2 SATURATION: CPT

## 2020-08-15 PROCEDURE — 80048 BASIC METABOLIC PNL TOTAL CA: CPT

## 2020-08-15 PROCEDURE — 36415 COLL VENOUS BLD VENIPUNCTURE: CPT

## 2020-08-15 PROCEDURE — 6370000000 HC RX 637 (ALT 250 FOR IP): Performed by: EMERGENCY MEDICINE

## 2020-08-15 PROCEDURE — 96375 TX/PRO/DX INJ NEW DRUG ADDON: CPT

## 2020-08-15 PROCEDURE — 6370000000 HC RX 637 (ALT 250 FOR IP): Performed by: STUDENT IN AN ORGANIZED HEALTH CARE EDUCATION/TRAINING PROGRAM

## 2020-08-15 PROCEDURE — 71045 X-RAY EXAM CHEST 1 VIEW: CPT

## 2020-08-15 PROCEDURE — 2500000003 HC RX 250 WO HCPCS: Performed by: STUDENT IN AN ORGANIZED HEALTH CARE EDUCATION/TRAINING PROGRAM

## 2020-08-15 PROCEDURE — 99285 EMERGENCY DEPT VISIT HI MDM: CPT

## 2020-08-15 PROCEDURE — G0480 DRUG TEST DEF 1-7 CLASSES: HCPCS

## 2020-08-15 PROCEDURE — 6360000002 HC RX W HCPCS: Performed by: STUDENT IN AN ORGANIZED HEALTH CARE EDUCATION/TRAINING PROGRAM

## 2020-08-15 PROCEDURE — 96365 THER/PROPH/DIAG IV INF INIT: CPT

## 2020-08-15 PROCEDURE — 6360000002 HC RX W HCPCS: Performed by: EMERGENCY MEDICINE

## 2020-08-15 PROCEDURE — 83880 ASSAY OF NATRIURETIC PEPTIDE: CPT

## 2020-08-15 PROCEDURE — 82533 TOTAL CORTISOL: CPT

## 2020-08-15 PROCEDURE — 84145 PROCALCITONIN (PCT): CPT

## 2020-08-15 PROCEDURE — 83735 ASSAY OF MAGNESIUM: CPT

## 2020-08-15 PROCEDURE — 82550 ASSAY OF CK (CPK): CPT

## 2020-08-15 PROCEDURE — 83605 ASSAY OF LACTIC ACID: CPT

## 2020-08-15 PROCEDURE — 82962 GLUCOSE BLOOD TEST: CPT

## 2020-08-15 PROCEDURE — 82553 CREATINE MB FRACTION: CPT

## 2020-08-15 PROCEDURE — 93005 ELECTROCARDIOGRAM TRACING: CPT | Performed by: EMERGENCY MEDICINE

## 2020-08-15 PROCEDURE — 6360000002 HC RX W HCPCS: Performed by: INTERNAL MEDICINE

## 2020-08-15 PROCEDURE — 2580000003 HC RX 258: Performed by: STUDENT IN AN ORGANIZED HEALTH CARE EDUCATION/TRAINING PROGRAM

## 2020-08-15 PROCEDURE — 80053 COMPREHEN METABOLIC PANEL: CPT

## 2020-08-15 PROCEDURE — 80307 DRUG TEST PRSMV CHEM ANLYZR: CPT

## 2020-08-15 PROCEDURE — 70450 CT HEAD/BRAIN W/O DYE: CPT

## 2020-08-15 PROCEDURE — 2140000000 HC CCU INTERMEDIATE R&B

## 2020-08-15 PROCEDURE — 2580000003 HC RX 258: Performed by: EMERGENCY MEDICINE

## 2020-08-15 PROCEDURE — 85025 COMPLETE CBC W/AUTO DIFF WBC: CPT

## 2020-08-15 PROCEDURE — 81001 URINALYSIS AUTO W/SCOPE: CPT

## 2020-08-15 PROCEDURE — 71250 CT THORAX DX C-: CPT

## 2020-08-15 PROCEDURE — 80076 HEPATIC FUNCTION PANEL: CPT

## 2020-08-15 PROCEDURE — U0003 INFECTIOUS AGENT DETECTION BY NUCLEIC ACID (DNA OR RNA); SEVERE ACUTE RESPIRATORY SYNDROME CORONAVIRUS 2 (SARS-COV-2) (CORONAVIRUS DISEASE [COVID-19]), AMPLIFIED PROBE TECHNIQUE, MAKING USE OF HIGH THROUGHPUT TECHNOLOGIES AS DESCRIBED BY CMS-2020-01-R: HCPCS

## 2020-08-15 PROCEDURE — 84484 ASSAY OF TROPONIN QUANT: CPT

## 2020-08-15 PROCEDURE — 2500000003 HC RX 250 WO HCPCS: Performed by: EMERGENCY MEDICINE

## 2020-08-15 PROCEDURE — 93005 ELECTROCARDIOGRAM TRACING: CPT | Performed by: STUDENT IN AN ORGANIZED HEALTH CARE EDUCATION/TRAINING PROGRAM

## 2020-08-15 PROCEDURE — 86140 C-REACTIVE PROTEIN: CPT

## 2020-08-15 RX ORDER — DEXTROSE MONOHYDRATE 25 G/50ML
12.5 INJECTION, SOLUTION INTRAVENOUS PRN
Status: DISCONTINUED | OUTPATIENT
Start: 2020-08-15 | End: 2020-08-16 | Stop reason: SDUPTHER

## 2020-08-15 RX ORDER — SODIUM POLYSTYRENE SULFONATE 15 G/60ML
30 SUSPENSION ORAL; RECTAL ONCE
Status: COMPLETED | OUTPATIENT
Start: 2020-08-15 | End: 2020-08-15

## 2020-08-15 RX ORDER — DEXTROSE MONOHYDRATE 50 MG/ML
100 INJECTION, SOLUTION INTRAVENOUS PRN
Status: DISCONTINUED | OUTPATIENT
Start: 2020-08-15 | End: 2020-08-16 | Stop reason: SDUPTHER

## 2020-08-15 RX ORDER — DEXTROSE MONOHYDRATE 25 G/50ML
25 INJECTION, SOLUTION INTRAVENOUS ONCE
Status: COMPLETED | OUTPATIENT
Start: 2020-08-15 | End: 2020-08-15

## 2020-08-15 RX ORDER — PROMETHAZINE HYDROCHLORIDE 25 MG/1
12.5 TABLET ORAL EVERY 6 HOURS PRN
Status: CANCELLED | OUTPATIENT
Start: 2020-08-15

## 2020-08-15 RX ORDER — MAGNESIUM SULFATE IN WATER 40 MG/ML
2 INJECTION, SOLUTION INTRAVENOUS ONCE
Status: COMPLETED | OUTPATIENT
Start: 2020-08-15 | End: 2020-08-15

## 2020-08-15 RX ORDER — NICOTINE POLACRILEX 4 MG
15 LOZENGE BUCCAL PRN
Status: DISCONTINUED | OUTPATIENT
Start: 2020-08-15 | End: 2020-08-16 | Stop reason: SDUPTHER

## 2020-08-15 RX ORDER — ACETAMINOPHEN 325 MG/1
650 TABLET ORAL EVERY 6 HOURS PRN
Status: CANCELLED | OUTPATIENT
Start: 2020-08-15

## 2020-08-15 RX ORDER — CALCIUM GLUCONATE 94 MG/ML
1 INJECTION, SOLUTION INTRAVENOUS ONCE
Status: DISCONTINUED | OUTPATIENT
Start: 2020-08-15 | End: 2020-08-15 | Stop reason: SDUPTHER

## 2020-08-15 RX ORDER — ONDANSETRON 2 MG/ML
4 INJECTION INTRAMUSCULAR; INTRAVENOUS EVERY 6 HOURS PRN
Status: CANCELLED | OUTPATIENT
Start: 2020-08-15

## 2020-08-15 RX ORDER — SODIUM CHLORIDE 0.9 % (FLUSH) 0.9 %
10 SYRINGE (ML) INJECTION EVERY 12 HOURS SCHEDULED
Status: CANCELLED | OUTPATIENT
Start: 2020-08-15

## 2020-08-15 RX ORDER — CALCIUM GLUCONATE 94 MG/ML
1 INJECTION, SOLUTION INTRAVENOUS ONCE
Status: COMPLETED | OUTPATIENT
Start: 2020-08-15 | End: 2020-08-15

## 2020-08-15 RX ORDER — ACETAMINOPHEN 650 MG/1
650 SUPPOSITORY RECTAL EVERY 6 HOURS PRN
Status: CANCELLED | OUTPATIENT
Start: 2020-08-15

## 2020-08-15 RX ORDER — SODIUM CHLORIDE 9 MG/ML
INJECTION, SOLUTION INTRAVENOUS CONTINUOUS
Status: DISCONTINUED | OUTPATIENT
Start: 2020-08-15 | End: 2020-08-16

## 2020-08-15 RX ORDER — POLYETHYLENE GLYCOL 3350 17 G/17G
17 POWDER, FOR SOLUTION ORAL DAILY PRN
Status: CANCELLED | OUTPATIENT
Start: 2020-08-15

## 2020-08-15 RX ORDER — ALBUTEROL SULFATE 2.5 MG/3ML
10 SOLUTION RESPIRATORY (INHALATION) ONCE
Status: DISCONTINUED | OUTPATIENT
Start: 2020-08-15 | End: 2020-08-19 | Stop reason: HOSPADM

## 2020-08-15 RX ORDER — 0.9 % SODIUM CHLORIDE 0.9 %
1000 INTRAVENOUS SOLUTION INTRAVENOUS ONCE
Status: COMPLETED | OUTPATIENT
Start: 2020-08-15 | End: 2020-08-15

## 2020-08-15 RX ORDER — SODIUM CHLORIDE 0.9 % (FLUSH) 0.9 %
10 SYRINGE (ML) INJECTION PRN
Status: CANCELLED | OUTPATIENT
Start: 2020-08-15

## 2020-08-15 RX ADMIN — CEFTRIAXONE SODIUM 1 G: 1 INJECTION, POWDER, FOR SOLUTION INTRAMUSCULAR; INTRAVENOUS at 18:05

## 2020-08-15 RX ADMIN — DEXTROSE MONOHYDRATE 25 G: 25 INJECTION, SOLUTION INTRAVENOUS at 19:40

## 2020-08-15 RX ADMIN — SODIUM CHLORIDE 1000 ML: 9 INJECTION, SOLUTION INTRAVENOUS at 19:42

## 2020-08-15 RX ADMIN — MAGNESIUM SULFATE 2 G: 2 INJECTION INTRAVENOUS at 19:41

## 2020-08-15 RX ADMIN — SODIUM POLYSTYRENE SULFONATE 30 G: 15 SUSPENSION ORAL; RECTAL at 18:06

## 2020-08-15 RX ADMIN — INSULIN HUMAN 10 UNITS: 100 INJECTION, SOLUTION PARENTERAL at 19:41

## 2020-08-15 RX ADMIN — INSULIN HUMAN 10 UNITS: 100 INJECTION, SOLUTION PARENTERAL at 16:17

## 2020-08-15 RX ADMIN — SODIUM BICARBONATE 50 MEQ: 84 INJECTION, SOLUTION INTRAVENOUS at 19:41

## 2020-08-15 RX ADMIN — CALCIUM GLUCONATE 1 G: 98 INJECTION, SOLUTION INTRAVENOUS at 19:40

## 2020-08-15 RX ADMIN — DEXTROSE MONOHYDRATE 25 G: 25 INJECTION, SOLUTION INTRAVENOUS at 16:15

## 2020-08-15 RX ADMIN — Medication 1 G: at 16:15

## 2020-08-15 ASSESSMENT — ENCOUNTER SYMPTOMS
DIARRHEA: 1
ABDOMINAL PAIN: 0
SHORTNESS OF BREATH: 0
ABDOMINAL DISTENTION: 0
VOMITING: 0
NAUSEA: 1
PHOTOPHOBIA: 0
COUGH: 0
CHEST TIGHTNESS: 0

## 2020-08-15 ASSESSMENT — PAIN SCALES - GENERAL: PAINLEVEL_OUTOF10: 0

## 2020-08-15 NOTE — ED PROVIDER NOTES
Mak Lewis is a 62year old female with PMH of PVD, HLD, HTN, DM, CAD, ischemic cardiomyopathy,  presented to ED with fatigue and diffuse weakness that is been present all day today. Patient states that she was trying to transfer from bed to her wheelchair when she felt as if she was too weak to transfer. Patient states she has been feeling weak and fatigued all day and has been getting progressively worse since morning. Patient states she also feels nauseous and is not any episodes of vomiting. Patient states she also has been having diarrhea for the past 3 to 5 days about 3 episodes per day. Patient denies any recent antibiotic use. Patient denies any sick contacts. Patient denies any blood in her stool or black tarry stool. Patient has a history of bilateral above-the-knee amputation that have been present for at least 5 years and due to her peripheral vascular disease and diabetes. Patient denies chest pain or shortness of breath. Patient denies any fevers or chills. Patient denies any falls. Patient also feels dizzy and lightheaded. Patient follows with Dr. Lee Wheatley for nephrology. The history is provided by the patient and medical records. Fatigue   Severity:  Moderate  Onset quality:  Gradual  Duration:  1 day  Timing:  Constant  Progression:  Worsening  Chronicity:  Recurrent  Context: not alcohol use, not change in medication and not recent infection    Relieved by:  Nothing  Worsened by:  Nothing  Ineffective treatments:  None tried  Associated symptoms: diarrhea and nausea    Associated symptoms: no abdominal pain, no chest pain, no cough, no dizziness, no dysuria, no numbness in extremities, no falls, no fever, no headaches, no melena, no shortness of breath and no vomiting    Risk factors: coronary artery disease, diabetes and heart disease         Review of Systems   Constitutional: Positive for fatigue. Negative for chills, diaphoresis and fever.    Eyes: Negative for photophobia and visual disturbance. Respiratory: Negative for cough, chest tightness and shortness of breath. Cardiovascular: Negative for chest pain. Gastrointestinal: Positive for diarrhea and nausea. Negative for abdominal distention, abdominal pain, melena and vomiting. Genitourinary: Negative for dysuria. Musculoskeletal: Negative for falls, neck pain and neck stiffness. Skin: Negative for pallor and rash. Allergic/Immunologic: Negative for immunocompromised state. Neurological: Positive for weakness. Negative for dizziness, syncope, facial asymmetry, numbness and headaches. Psychiatric/Behavioral: Negative for confusion. Physical Exam  Vitals signs and nursing note reviewed. Constitutional:       General: She is not in acute distress. Appearance: Normal appearance. She is not diaphoretic. HENT:      Head: Normocephalic and atraumatic. Mouth/Throat:      Pharynx: Oropharynx is clear. Eyes:      General: No scleral icterus. Conjunctiva/sclera: Conjunctivae normal.      Pupils: Pupils are equal, round, and reactive to light. Neck:      Musculoskeletal: Normal range of motion and neck supple. Cardiovascular:      Rate and Rhythm: Normal rate and regular rhythm. Pulmonary:      Effort: Pulmonary effort is normal.      Breath sounds: Normal breath sounds. Abdominal:      General: Bowel sounds are normal. There is no distension. Palpations: Abdomen is soft. Tenderness: There is no abdominal tenderness. There is no guarding or rebound. Musculoskeletal:      Comments: Above the knee amputation bilaterally   Skin:     Capillary Refill: Capillary refill takes less than 2 seconds. Coloration: Skin is pale. Findings: No erythema or rash. Neurological:      Mental Status: She is alert and oriented to person, place, and time.    Psychiatric:         Mood and Affect: Mood normal.          Procedures     MDM  Number of Diagnoses or Management Options  Acute cystitis with hematuria:   Acute renal failure superimposed on chronic kidney disease, unspecified CKD stage, unspecified acute renal failure type [facility]): Fatigue, unspecified type:   Hyperkalemia:   Hypomagnesemia:   Diagnosis management comments: Al Rogers is a 67-year-old female presented emergency department with fatigue and diffuse weakness. Patient's BG was over 200, no findings of DKA. Patient was found to have hyperkalemia over 6 with some mild peaked T waves. Patient also found to have a hypomagnesemia and acute on chronic renal failure. Patient was treated with insulin and calcium for hyperkalemia. Patient's bicarb was 25 on BMP. Patient does have acute on chronic renal failure failure with blood pressure around 311 systolic patient was not given lasix. Electrolyte abnormality likely cause of patient's symptoms. Patient's head CT was ordered due to dizziness and nausea and was unremarkable. Patient was also found to have UTI without abdominal pain or leukocytosis. Patient treated with rocephin in ED. Discussed results and plan with patient she is agreeable to admission and well-appearing at this time and not in any acute distress. Nephrology was consulted and make further recommendations discussed in ED course and ordered. Patient was accepted for admission and case discussed with resident. ED Course as of Aug 15 1802   Sat Aug 15, 2020   1729 EKG: This EKG is signed and interpreted by me. Rate: 66  Rhythm: Sinus  Interpretation: 1st degree AV block with nonspecific intraventricular block, peaked t waves anterior septal  Comparison: changes compared to previous EKG      [SS]   1747 Patient accepted for admission by Dr. Sanford Kunz, repeat potassium and EKG ordered    [SS]   9953 Discussed with nephrology Dr. Yarely Ward, he recommended giving Kayexalate 30 g p.o. and rechecking a BMP in 2 hours.   He also recommended holding the ACE inhibitor for patient and if patient's blood pressure tolerates giving 40 mg IV Lasix. 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 ([address] Atherosclerosis of native arteries of the extremities with ulceration(440.23), Atherosclerosis of native artery of left lower extremity with ulceration ([address] Blood transfusion reaction, CAD (coronary artery disease), Charcot's joint of foot due to diabetes ([address] CHF (congestive heart failure) ([address] Chronic osteomyelitis, ankle and foot, Depression, Diabetic neuropathy ([address] Diabetic ulcer of left foot associated with type 1 diabetes mellitus ([address] Femoral-popliteal atherosclerosis ([address] Foot ulceration ([address] History of blood transfusion, HTN (hypertension), Hyperlipidemia, Ischemic cardiomyopathy, Mitral regurgitation, Non-pressure chronic ulcer of left lower leg with fat layer exposed ([address] PVD (peripheral vascular disease) ([address] Systolic heart failure ([address] Type II or unspecified type diabetes mellitus with other specified manifestations, not stated as uncontrolled, Ulcer of leg due to secondary diabetes ([address] Ulcer of other part of foot, and Unspecified diseases of blood and blood-forming organs. Past Surgical History:  has a past surgical history that includes Tonsillectomy; ECHO Compl W Dop Color Flow (2012);  section; Toe amputation; ECHO Compl W Dop Color Flow (2013); Cardiac catheterization (2012);  Cardiac catheterization (2013); ECHO Compl W Dop Color Flow 146 mmol/L    Potassium 6.2 (H) 3.5 - 5.0 mmol/L    Chloride 105 98 - 107 mmol/L    CO2 25 22 - 29 mmol/L    Anion Gap 12 7 - 16 mmol/L    Glucose 205 (H) 74 - 99 mg/dL    BUN 46 (H) 8 - 23 mg/dL    CREATININE 1.7 (H) 0.5 - 1.0 mg/dL    GFR Non-African American 30 >=60 mL/min/1.73    GFR African American 37     Calcium 9.0 8.6 - 10.2 mg/dL    Total Protein 6.4 6.4 - 8.3 g/dL    Alb 3.9 3.5 - 5.2 g/dL    Total Bilirubin 0.3 0.0 - 1.2 mg/dL    Alkaline Phosphatase 79 35 - 104 U/L    ALT 18 0 - 32 U/L    AST 14 0 - 31 U/L   Troponin   Result Value Ref Range    Troponin <0.01 0.00 - 0.03 ng/mL   Brain Natriuretic Peptide   Result Value Ref Range    Pro-BNP 1,420 (H) 0 - 125 pg/mL   Urinalysis   Result Value Ref Range    Color, UA DARK YELLOW (A) Straw/Yellow    Clarity, UA Clear Clear    Glucose, Ur Negative Negative mg/dL    Bilirubin Urine SMALL (A) Negative    Ketones, Urine TRACE (A) Negative mg/dL    Specific Gravity, UA >=1.030 1.005 - 1.030    Blood, Urine Negative Negative    pH, UA 5.0 5.0 - 9.0    Protein, UA Negative Negative mg/dL    Urobilinogen, Urine 0.2 <2.0 E.U./dL    Nitrite, Urine POSITIVE (A) Negative    Leukocyte Esterase, Urine MODERATE (A) Negative   Lactic Acid, Plasma   Result Value Ref Range    Lactic Acid 1.7 0.5 - 2.2 mmol/L   Magnesium   Result Value Ref Range    Magnesium 1.5 (L) 1.6 - 2.6 mg/dL   Microscopic Urinalysis   Result Value Ref Range    Hyaline Casts, UA 3-5 (A) 0 - 2 /LPF    WBC, UA 10-20 (A) 0 - 5 /HPF    RBC, UA 0-1 0 - 2 /HPF    Epithelial Cells, UA RARE /HPF    Bacteria, UA FEW (A) None Seen /HPF   POCT Glucose   Result Value Ref Range    QC OK? y     Glucose 207 mg/dL   POCT Glucose   Result Value Ref Range    Meter Glucose 207 (H) 74 - 99 mg/dL   POCT Glucose   Result Value Ref Range    Meter Glucose 206 (H) 74 - 99 mg/dL   EKG 12 Lead   Result Value Ref Range    Ventricular Rate 66 BPM    Atrial Rate 66 BPM    P-R Interval 220 ms    QRS Duration 134 ms    Q-T Interval 448 ms    QTc Calculation (Bazett) 469 ms    P Axis 64 degrees    R Axis 87 degrees    T Axis 28 degrees       RADIOLOGY:  XR CHEST PORTABLE   Final Result   Diffuse groundglass opacities scattered throughout the   lungs bilaterally in the setting of cardiomegaly, likely representing   pulmonary edema. CT HEAD WO CONTRAST    (Results Pending)           ------------------------- NURSING NOTES AND VITALS REVIEWED ---------------------------  Date / Time Roomed:  8/15/2020  2:13 PM  ED Bed Assignment:  18A/18A-18    The nursing notes within the ED encounter and vital signs as below have been reviewed. Patient Vitals for the past 24 hrs:   BP Temp Temp src Pulse Resp SpO2 Height Weight   08/15/20 1600 112/69 -- -- 67 15 98 % -- --   08/15/20 1414 104/63 98.4 °F (36.9 °C) Temporal 67 16 96 % 5' 2\" (1.575 m) 215 lb (97.5 kg)       Oxygen Saturation Interpretation: Normal on RA and on her 2L NC    ------------------------------------------ PROGRESS NOTES ------------------------------------------  Re-evaluation(s):  Time: 6pm  Patients symptoms show no change  Repeat physical examination is not changed    Counseling:  I have spoken with the patient and discussed todays results, in addition to providing specific details for the plan of care and counseling regarding the diagnosis and prognosis. Their questions are answered at this time and they are agreeable with the plan of admission.    --------------------------------- ADDITIONAL PROVIDER NOTES ---------------------------------  Consultations:  Spoke with Dr. Dorrene Contras. Discussed case. They will admit the patient.   This patient's ED course included: a personal history and physicial examination, re-evaluation prior to disposition, multiple bedside re-evaluations, IV medications, cardiac monitoring, continuous pulse oximetry and complex medical decision making and emergency management    This patient has remained hemodynamically stable during their ED course. Diagnosis:  1. Hyperkalemia    2. Acute renal failure superimposed on chronic kidney disease, unspecified CKD stage, unspecified acute renal failure type [address] [address]    3. Hypomagnesemia    4. Fatigue, unspecified type    5. Acute cystitis with hematuria        Disposition:  Patient's disposition: Admit to telemetry  Patient's condition is stable.               Lan Thorne MD  Resident  08/15/20 0917

## 2020-08-15 NOTE — H&P
[facility]  Internal Medicine Residency Program  History and Physical    Patient:  Sanaz Sanchez 62 y.o. female MRN: [id number]     Date of Service: 8/15/2020    Hospital Day: 1      Chief complaint: had concerns including Fatigue (was okay yesterdawy but today is having trouble getting out of bed). History of Present Illness   The patient is a 62 y.o. female with past medical history of peripheral vascular disease, hyperlipidemia, hypertension, diabetes mellitus with multiple complications related bilateral leg amputations, neuropathy, coronary artery disease, heart failure with reduced ejection fraction, anemia presented to the ED with weakness and fatigue. Patient woke up this morning with weakness, unable to hold or rise arms, had difficulty transferring from bed to wheelchair. Weakness is constant, worsening, no relieving or exacerbating factors. It is associated with jerking movement of her limbs, pressure in her head, difficulty getting up. Patient also complaining of 3 episodes per day of watery diarrhea for the last 3 days. She endorses stool incontinence to occasion but denies any associated pain. She is also complaining of dark urine for the last 2 weeks but denies unusual smell, urgency frequency, or burning micturition. Her last visit for micturition was last night. She endorses 1 bout of cough for couple of minutes. It was dry and subsides on its own. She denies fever, chills, recent infection, recent exposure to COVID-19 patient, recent travel, loss of consciousness, any recent fall, chest pain, shortness of breath. ED Course:     Patient came to the ED with fatigue weakness all over her body started earlier this morning. Initial vitals in the ED were compared to 98.4 °F, respiratory rate 16, pulse 67, blood pressure 104/60  mmHg and SPO2 96% on room air.     Lab in the ED were remarkable for potassium of 6.2, magnesium 1.5, BUN 46, creatinine 1.7, blood glucose 207, proBNP 1420, hemoglobin 12.3, , MCHC 30.1, microscopic urinalysis remarkable for dark color small bilirubin trace ketones, positive nitrite and leukocyte esterase. Microscopic UA significant for hyaline cast, WBC 10-20, bacteria and leukocyte esterase. CT head unremarkable for any acute changes, chest x-ray shows bilateral groundglass opacity and cardiomegaly. EKG positive for first-degree AV block. Preliminary ED diagnosis include: And brain dead for her hyperkalemia, hypomagnesemia, acute cystitis with hematuria, acute renal failure superimposed on CKD. In the abundance of caution COVID-19 test was sent out      ED Meds: Patient was given regular insulin, dextrose 50 IV solution, calcium gluconate(10% injection 1 g, calcium gluconate 1 g dextrose 5% 100 mL), serum bicarbonate 8.4% 50 mEq and sodium polystyrene for hyperkalemia. Ceftriaxone was given for suspected acute cystitis.   Magnesium sulfate 2 g and 50 mL,  ED Fluids: Patient was given 2 L normal saline bolus    Past Medical History:      Diagnosis Date    MATTHEW (acute kidney injury) ([address]     Anemia     Aorto-iliac atherosclerosis ([address] 3/15/2016    Atherosclerosis of native arteries of left leg with ulceration of other part of foot ([address] 5/10/2017    Atherosclerosis of native arteries of the extremities with gangrene ([address] 6/20/2017    Atherosclerosis of native arteries of the extremities with ulceration(440.23) 2/21/2014    Atherosclerosis of native artery of left lower extremity with ulceration ([address] 2/26/2017    Blood transfusion reaction     CAD (coronary artery disease)     Charcot's joint of foot due to diabetes ([address] 2/21/2014    CHF (congestive heart failure) (HCC)     Chronic osteomyelitis, ankle and foot     Depression     Diabetic neuropathy (HCC)     Diabetic ulcer of left foot associated with type 1 diabetes mellitus ([address] 2/26/2017    Femoral-popliteal atherosclerosis ([address] 3/15/2016    Foot ulceration ([facility] 10/28/2014    History of blood transfusion     HTN (hypertension)     Hyperlipidemia     Ischemic cardiomyopathy     EF 40%    Mitral regurgitation     Non-pressure chronic ulcer of left lower leg with fat layer exposed ([facility] 6/29/2016    PVD (peripheral vascular disease) ([facility]     Systolic heart failure ([facility] 10/20/13    10/20/13-echocardiogram revealed an LVEF of 35%    Type II or unspecified type diabetes mellitus with other specified manifestations, not stated as uncontrolled     Ulcer of leg due to secondary diabetes ([facility] 3/15/2016    Ulcer of other part of foot     Unspecified diseases of blood and blood-forming organs        Past Surgical History:        Procedure Laterality Date    ABDOMEN SURGERY      ABOVE KNEE AMPUTATION Left 06/23/2017    with Dr. Dg Morin, ARTERY Right 12/18/2013    R SFA 6x200 mm, [place]    CARDIAC CATHETERIZATION  12/2012    [facility] in [address] [place] [address]  5/29/2013    Dr. David Armando  06/22/2017    Dr. Sean Dominguez      x 3    ECHO COMPL W DOP COLOR FLOW  9/22/2012 9/22/2012-echocardiogram revealed an LVEF of 60%    ECHO COMPL W DOP COLOR FLOW  1/28/2013         ECHO COMPL W DOP COLOR FLOW  10/20/2013         ECHO COMPLETE  12/13/2013         FOOT SURGERY  12/15/2013    incision and drainage with bone biopsy    LEG AMPUTATION BELOW KNEE Right 2/27/14    right below the knee amp    TOE AMPUTATION      right foot    TONSILLECTOMY      TRANSESOPHAGEAL ECHOCARDIOGRAM  10/23/2013         VASCULAR SURGERY         Medications Prior to Admission:    Prior to Admission medications    Medication Sig Start Date End Date Taking? Authorizing Provider   gabapentin (NEURONTIN) 600 MG tablet Take 1 tablet by mouth 3 times daily for 90 days.  8/12/20 11/10/20  Isaiah Shaw MD   HYDROcodone-acetaminophen (NORCO) 5-325 MG per tablet Take 1 tablet by mouth every 8 hours as needed for Pain for up to 30 days.  10/11/20 11/10/20  Christian Longo MD   bumetanide (BUMEX) 1 MG tablet take 1 tablet by mouth once daily 8/7/20   Sophia Hubbard MD   lisinopril (PRINIVIL;ZESTRIL) 5 MG tablet Take 1 tablet by mouth daily 8/7/20   Domingo Mcconnell MD   insulin glargine (LANTUS SOLOSTAR) 100 UNIT/ML injection pen Inject 75 Units into the skin nightly 6/17/20   Adriano Encarnacion DO   aspirin (ASPIRIN LOW DOSE) 81 MG EC tablet Take 1 tablet by mouth daily 6/15/20   Harrison Chacko MD   isosorbide mononitrate (IMDUR) 60 MG extended release tablet Take one tablet 60 mg daily 6/15/20   Harrison Chacko MD   blood glucose test strips (ONE TOUCH ULTRA TEST) strip use TEST STRIP four times a day 6/15/20   Harrison Chacko MD   atorvastatin (LIPITOR) 40 MG tablet Take 1 tablet by mouth daily 6/15/20   Harrison Chacko MD   docusate sodium (COLACE) 100 MG capsule Take 1 capsule by mouth daily as needed for Constipation 6/15/20   Harrison Chacko MD   insulin lispro (HUMALOG) 100 UNIT/ML injection vial Please take 18U three times daily with meals (before meals) 6/15/20   Harrison Chacko MD   nitroGLYCERIN (NITROSTAT) 0.4 MG SL tablet Place 1 tablet under the tongue every 5 minutes as needed for Chest pain 6/15/20   Harrison Chacko MD   sertraline (ZOLOFT) 100 MG tablet Take 1 tablet by mouth daily 6/15/20   Harrison Chacko MD   metoprolol (LOPRESSOR) 100 MG tablet Take 1 tablet by mouth 2 times daily 6/15/20   Harrison Chacko MD   clopidogrel (PLAVIX) 75 MG tablet Take 1 tablet by mouth daily 6/15/20   Harrison Chacko MD   amLODIPine (NORVASC) 2.5 MG tablet Take 1 tablet by mouth daily 6/15/20   Harrison Chacko MD   ranolazine (RANEXA) 1000 MG extended release tablet Take 1 tablet by mouth 2 times daily 6/15/20   Harrison Chacko MD   pantoprazole (PROTONIX) 40 MG tablet Take 1 tablet by mouth every morning (before breakfast) 6/15/20   Harrison Chacko MD   folic acid (FOLVITE) 1 MG tablet Take 1 RBC 4.00   HGB 12.3   HCT 40.8   .0*   MCH 30.8   MCHC 30.1*   RDW 13.9      MPV 9.6       CBC:   Lab Results   Component Value Date    WBC 8.8 08/15/2020    RBC 4.00 08/15/2020    HGB 12.3 08/15/2020    HCT 40.8 08/15/2020    .0 08/15/2020    RDW 13.9 08/15/2020     08/15/2020     BMP:    Lab Results   Component Value Date     08/15/2020    K 6.6 08/15/2020     08/15/2020    CO2 26 08/15/2020    BUN 47 08/15/2020     CMP:  Lab Results   Component Value Date     08/15/2020    K 6.6 08/15/2020     08/15/2020    CO2 26 08/15/2020    BUN 47 08/15/2020    PROT 5.6 08/15/2020     U/A:  No components found for: [place], USPGRAV, UPH, UPROTEIN, UGLUCOSE, UKETONE, UBILI, UBLOOD, [place], UUROBIL, [place], [facility], [place], [place], [place], [place], [place]  Iron Studies:    Lab Results   Component Value Date    TIBC 207 2017    FERRITIN 153 2017     FOLATE:    Lab Results   Component Value Date    FOLATE 18.0 2019       Imaging Studies:     Ct Head Wo Contrast    Result Date: 8/15/2020  Patient MRN:  [id number] : 1960 Age: 62 years Gender: Female Order Date:  8/15/2020 5:02 PM EXAM: CT HEAD WO CONTRAST NUMBER OF IMAGES:  140 INDICATION:  dizziness dizziness COMPARISON: CT scan of the head 2019 Technique: Low-dose CT  acquisition technique included one of following options; 1 . Automated exposure control, 2. Adjustment of MA and or KV according to patient's size or 3. Use of iterative reconstruction. Multiple CT sections were obtained with sagittal and coronal MPR reconstructions. The ventricles are prominent. The gyri and sulci appear  prominent. The white matter appears  prominent. There is no evidence for hemorrhage. There is no infarct identified. There is no mass effect identified. There is no mass identified. Diffuse atrophy likely age related Findings compatible with small vessel ischemic changes.      Xr Chest Portable    Result Date: 8/15/2020  Patient MRN: [id number] : 1960 Age:  62 years Gender: Female Order Date: 8/15/2020 2:40 PM Exam: XR CHEST PORTABLE Number of Images: 1 view Indication:   chest pain chest pain Comparison: XR CHEST PORTABLE 2020 Findings: Stable cardiomegaly. Diffuse groundglass opacities scattered throughout the lungs bilaterally. There are atherosclerotic calcifications of aorta. Diffuse groundglass opacities scattered throughout the lungs bilaterally in the setting of cardiomegaly, likely representing pulmonary edema. EKG: normal sinus rhythm, unchanged from previous tracings. Resident's Assessment and Plan     Luis Kumar is a 62 y.o. female with past medical history of peripheral vascular disease, hyperlipidemia, hypertension, diabetes mellitus with multiple complications related bilateral leg amputations, neuropathy, coronary artery disease, heart failure with reduced ejection fraction, anemia presented to the ED with weakness and fatigue.        AMS secondary to hypercapnia versus acute cystitis versus electrolytes abnormalities    Patient is lethargic, weak, Has lightheadedness  Patient had decreased attention  Patient is alert oriented X 3 but falling asleep during H and P   She is on 3 L oxygen at home  pH 7.2, PCO2 58.1, PO2 61.5  Anion gap 12  Ammonia 19  CT chest unremarkable for any acute changes  Repeat ABG  Repeat CMP        Acute hypoxic hypercapnic respiratory failure   PCO2 58.1, PO2 61.5  Patient uses 3 L nasal cannula on and off  Oxygen saturation 2 L nasal 96  Acute pulmonary edema and diffuse groundglass opacities on chest x-ray  Trace right pleural effusion on CT chest  Continue oxygen treatment  Repeat ABG  Repeat chest x-ray          Acute cystitis  Patient has dark urine for the last to 3 weeks  Patient has malaise, altered mental status  No complaints of frequency urgency or burning micturition  Patient has distended abdomen,   No fever, chills  Urinalysis microscopic shows dark color positive for nitrate and leukocyte esterase  Urinalysis microscopic shows 35 hyaline cast, WBC 10-20, moderate leukocyte Estrace, few bacteria  Started on ceftriaxone in the ED  Continue ceftriaxone  Record in and out  Send out urine culture  Record vitals   If there is fever send blood culture as well   Obtain renal ultrasound      Hyperkalemia   Patient has fatigue, muscle weakness, malaise  ED finding shows potassium 6.2  EKG in the ED shows stable AV block  Patient has given calcium gluconate 2 g, regular insulin 10 units dextrose 50% IV solution 25 g twice  Given Kayexalate 50 Florentin per 60 mL suspension 30 mg    Repeat BMP  Repeat EKG for T wave abnormalities  Repeat the above potassium is still high  Hypoglycemia protocol PRN      Hypomagnesemia  Magnesium 1.5  Calcium 9.0  Given magnesium sulfate 2 g 50 mL IV   Repeat BMP      Acute kidney injury superimposed chronic kidney disease stage II  Serum creatinine 1.5 with baseline 0.7  GFR 35 at admission baseline 60  Patient started on IV fluids normal saline at 100 mL/h continuous  Record in and out  Obtain urine electrolytes, urine creatinine, urea level  Patient is catheterized      Past medical history of diabetes mellitus  Blood sugar in the   Takes 75 units nightly Lantus  Takes 18 units 3 times daily Humalog  Takes Glucophage 500 mg twice daily  Neurontin 600 mg 1 tablet 3 times daily    Continue the above home treatment and titrate as appropriate  Check blood sugar every 4 hour  Hypoglycemia protocol PRN      Past medical history of hypertension  Blood pressure in the /60  MmHg  Home meds include Bumex 1 mg once daily lisinopril 5 mg daily Lopressor 100 mg daily, Norvasc 2.5 mg daily  Hold above medications  Monitor vitals      Past medical history depression/anxiety  Home meds include Zoloft 100 mg daily  Continue the above medication      Past medical history of coronary artery disease and peripheral vascular disease,  HLD  Status post bilateral amputation above knees secondary to diabetic complication and PVD  Home meds include aspirin 81 mg Imdur 60 mg, Ranexa thousand milligrams, Plavix 75 mg  Lipitor 40 mg  Continue the above medication      Full code  PT/OT evaluation:  DVT prophylaxis/ GI prophylaxis:   Disposition: Inpatient monitoring  Ace Monzon MD, PGY-1   Attending physician: Dr. Dawson Ko

## 2020-08-16 ENCOUNTER — APPOINTMENT (OUTPATIENT)
Dept: ULTRASOUND IMAGING | Age: 60
DRG: 469 | End: 2020-08-16
Payer: MEDICAID

## 2020-08-16 LAB
AMPHETAMINE SCREEN, URINE: NOT DETECTED
ANION GAP SERPL CALCULATED.3IONS-SCNC: 12 MMOL/L (ref 7–16)
ANION GAP SERPL CALCULATED.3IONS-SCNC: 13 MMOL/L (ref 7–16)
BARBITURATE SCREEN URINE: NOT DETECTED
BASOPHILS ABSOLUTE: 0.04 E9/L (ref 0–0.2)
BASOPHILS ABSOLUTE: 0.04 E9/L (ref 0–0.2)
BASOPHILS RELATIVE PERCENT: 0.5 % (ref 0–2)
BASOPHILS RELATIVE PERCENT: 0.5 % (ref 0–2)
BENZODIAZEPINE SCREEN, URINE: NOT DETECTED
BUN BLDV-MCNC: 26 MG/DL (ref 8–23)
BUN BLDV-MCNC: 34 MG/DL (ref 8–23)
CALCIUM SERPL-MCNC: 9.1 MG/DL (ref 8.6–10.2)
CALCIUM SERPL-MCNC: 9.1 MG/DL (ref 8.6–10.2)
CANNABINOID SCREEN URINE: NOT DETECTED
CHLORIDE BLD-SCNC: 103 MMOL/L (ref 98–107)
CHLORIDE BLD-SCNC: 106 MMOL/L (ref 98–107)
CHLORIDE URINE RANDOM: 70 MMOL/L
CO2: 24 MMOL/L (ref 22–29)
CO2: 27 MMOL/L (ref 22–29)
COCAINE METABOLITE SCREEN URINE: NOT DETECTED
CORTISOL TOTAL: 5.05 MCG/DL (ref 2.68–18.4)
CREAT SERPL-MCNC: 1 MG/DL (ref 0.5–1)
CREAT SERPL-MCNC: 1.2 MG/DL (ref 0.5–1)
CREATININE URINE: 34 MG/DL (ref 29–226)
CREATININE URINE: 80 MG/DL (ref 29–226)
EKG ATRIAL RATE: 66 BPM
EKG ATRIAL RATE: 72 BPM
EKG P AXIS: 50 DEGREES
EKG P AXIS: 64 DEGREES
EKG P-R INTERVAL: 198 MS
EKG P-R INTERVAL: 220 MS
EKG Q-T INTERVAL: 422 MS
EKG Q-T INTERVAL: 448 MS
EKG QRS DURATION: 134 MS
EKG QRS DURATION: 140 MS
EKG QTC CALCULATION (BAZETT): 462 MS
EKG QTC CALCULATION (BAZETT): 469 MS
EKG R AXIS: 86 DEGREES
EKG R AXIS: 87 DEGREES
EKG T AXIS: -30 DEGREES
EKG T AXIS: 28 DEGREES
EKG VENTRICULAR RATE: 66 BPM
EKG VENTRICULAR RATE: 72 BPM
EOSINOPHILS ABSOLUTE: 0.32 E9/L (ref 0.05–0.5)
EOSINOPHILS ABSOLUTE: 0.33 E9/L (ref 0.05–0.5)
EOSINOPHILS RELATIVE PERCENT: 3.8 % (ref 0–6)
EOSINOPHILS RELATIVE PERCENT: 4.1 % (ref 0–6)
FENTANYL SCREEN, URINE: NOT DETECTED
FOLATE: >20 NG/ML (ref 4.8–24.2)
GFR AFRICAN AMERICAN: 55
GFR AFRICAN AMERICAN: >60
GFR NON-AFRICAN AMERICAN: 46 ML/MIN/1.73
GFR NON-AFRICAN AMERICAN: 56 ML/MIN/1.73
GLUCOSE BLD-MCNC: 143 MG/DL (ref 74–99)
GLUCOSE BLD-MCNC: 231 MG/DL (ref 74–99)
HBA1C MFR BLD: 7.7 % (ref 4–5.6)
HCT VFR BLD CALC: 40.3 % (ref 34–48)
HCT VFR BLD CALC: 41.1 % (ref 34–48)
HEMOGLOBIN: 12.5 G/DL (ref 11.5–15.5)
HEMOGLOBIN: 12.5 G/DL (ref 11.5–15.5)
IMMATURE GRANULOCYTES #: 0.02 E9/L
IMMATURE GRANULOCYTES #: 0.03 E9/L
IMMATURE GRANULOCYTES %: 0.2 % (ref 0–5)
IMMATURE GRANULOCYTES %: 0.4 % (ref 0–5)
LYMPHOCYTES ABSOLUTE: 1.01 E9/L (ref 1.5–4)
LYMPHOCYTES ABSOLUTE: 1.03 E9/L (ref 1.5–4)
LYMPHOCYTES RELATIVE PERCENT: 12.4 % (ref 20–42)
LYMPHOCYTES RELATIVE PERCENT: 12.6 % (ref 20–42)
Lab: ABNORMAL
MAGNESIUM: 1.8 MG/DL (ref 1.6–2.6)
MCH RBC QN AUTO: 30.7 PG (ref 26–35)
MCH RBC QN AUTO: 31.3 PG (ref 26–35)
MCHC RBC AUTO-ENTMCNC: 30.4 % (ref 32–34.5)
MCHC RBC AUTO-ENTMCNC: 31 % (ref 32–34.5)
MCV RBC AUTO: 100.8 FL (ref 80–99.9)
MCV RBC AUTO: 101 FL (ref 80–99.9)
METER GLUCOSE: 106 MG/DL (ref 74–99)
METER GLUCOSE: 148 MG/DL (ref 74–99)
METER GLUCOSE: 182 MG/DL (ref 74–99)
METER GLUCOSE: 268 MG/DL (ref 74–99)
METER GLUCOSE: 293 MG/DL (ref 74–99)
METHADONE SCREEN, URINE: NOT DETECTED
MICROALBUMIN UR-MCNC: 54 MG/L
MICROALBUMIN/CREAT UR-RTO: 158.8 (ref 0–30)
MONOCYTES ABSOLUTE: 0.38 E9/L (ref 0.1–0.95)
MONOCYTES ABSOLUTE: 0.39 E9/L (ref 0.1–0.95)
MONOCYTES RELATIVE PERCENT: 4.7 % (ref 2–12)
MONOCYTES RELATIVE PERCENT: 4.7 % (ref 2–12)
NEUTROPHILS ABSOLUTE: 6.25 E9/L (ref 1.8–7.3)
NEUTROPHILS ABSOLUTE: 6.51 E9/L (ref 1.8–7.3)
NEUTROPHILS RELATIVE PERCENT: 77.9 % (ref 43–80)
NEUTROPHILS RELATIVE PERCENT: 78.2 % (ref 43–80)
OPIATE SCREEN URINE: POSITIVE
OSMOLALITY URINE: 452 MOSM/KG (ref 300–900)
OXYCODONE URINE: NOT DETECTED
PDW BLD-RTO: 13.9 FL (ref 11.5–15)
PDW BLD-RTO: 14 FL (ref 11.5–15)
PHENCYCLIDINE SCREEN URINE: NOT DETECTED
PLATELET # BLD: 220 E9/L (ref 130–450)
PLATELET # BLD: 221 E9/L (ref 130–450)
PMV BLD AUTO: 9.4 FL (ref 7–12)
PMV BLD AUTO: 9.7 FL (ref 7–12)
POTASSIUM SERPL-SCNC: 4.5 MMOL/L (ref 3.5–5)
POTASSIUM SERPL-SCNC: 5.2 MMOL/L (ref 3.5–5)
POTASSIUM, UR: 38.3 MMOL/L
PRO-BNP: 2348 PG/ML (ref 0–125)
PROTEIN PROTEIN: 11 MG/DL (ref 0–12)
PROTEIN/CREAT RATIO: 0.3
PROTEIN/CREAT RATIO: 0.3 (ref 0–0.2)
RBC # BLD: 4 E12/L (ref 3.5–5.5)
RBC # BLD: 4.07 E12/L (ref 3.5–5.5)
SODIUM BLD-SCNC: 142 MMOL/L (ref 132–146)
SODIUM BLD-SCNC: 143 MMOL/L (ref 132–146)
SODIUM URINE: 97 MMOL/L
UREA NITROGEN, UR: 688 MG/DL (ref 800–1666)
VITAMIN B-12: 580 PG/ML (ref 211–946)
WBC # BLD: 8 E9/L (ref 4.5–11.5)
WBC # BLD: 8.3 E9/L (ref 4.5–11.5)

## 2020-08-16 PROCEDURE — 80307 DRUG TEST PRSMV CHEM ANLYZR: CPT

## 2020-08-16 PROCEDURE — 82607 VITAMIN B-12: CPT

## 2020-08-16 PROCEDURE — 83935 ASSAY OF URINE OSMOLALITY: CPT

## 2020-08-16 PROCEDURE — 82044 UR ALBUMIN SEMIQUANTITATIVE: CPT

## 2020-08-16 PROCEDURE — 6360000002 HC RX W HCPCS: Performed by: INTERNAL MEDICINE

## 2020-08-16 PROCEDURE — 84133 ASSAY OF URINE POTASSIUM: CPT

## 2020-08-16 PROCEDURE — 80048 BASIC METABOLIC PNL TOTAL CA: CPT

## 2020-08-16 PROCEDURE — 82570 ASSAY OF URINE CREATININE: CPT

## 2020-08-16 PROCEDURE — 83036 HEMOGLOBIN GLYCOSYLATED A1C: CPT

## 2020-08-16 PROCEDURE — 2580000003 HC RX 258: Performed by: INTERNAL MEDICINE

## 2020-08-16 PROCEDURE — 83735 ASSAY OF MAGNESIUM: CPT

## 2020-08-16 PROCEDURE — 85025 COMPLETE CBC W/AUTO DIFF WBC: CPT

## 2020-08-16 PROCEDURE — 93010 ELECTROCARDIOGRAM REPORT: CPT | Performed by: INTERNAL MEDICINE

## 2020-08-16 PROCEDURE — 76770 US EXAM ABDO BACK WALL COMP: CPT

## 2020-08-16 PROCEDURE — 84540 ASSAY OF URINE/UREA-N: CPT

## 2020-08-16 PROCEDURE — 82962 GLUCOSE BLOOD TEST: CPT

## 2020-08-16 PROCEDURE — 36415 COLL VENOUS BLD VENIPUNCTURE: CPT

## 2020-08-16 PROCEDURE — 84156 ASSAY OF PROTEIN URINE: CPT

## 2020-08-16 PROCEDURE — 82746 ASSAY OF FOLIC ACID SERUM: CPT

## 2020-08-16 PROCEDURE — 82436 ASSAY OF URINE CHLORIDE: CPT

## 2020-08-16 PROCEDURE — 93005 ELECTROCARDIOGRAM TRACING: CPT | Performed by: INTERNAL MEDICINE

## 2020-08-16 PROCEDURE — 83880 ASSAY OF NATRIURETIC PEPTIDE: CPT

## 2020-08-16 PROCEDURE — 99222 1ST HOSP IP/OBS MODERATE 55: CPT | Performed by: INTERNAL MEDICINE

## 2020-08-16 PROCEDURE — 6370000000 HC RX 637 (ALT 250 FOR IP): Performed by: INTERNAL MEDICINE

## 2020-08-16 PROCEDURE — 94660 CPAP INITIATION&MGMT: CPT

## 2020-08-16 PROCEDURE — 87088 URINE BACTERIA CULTURE: CPT

## 2020-08-16 PROCEDURE — 2140000000 HC CCU INTERMEDIATE R&B

## 2020-08-16 PROCEDURE — 2500000003 HC RX 250 WO HCPCS: Performed by: INTERNAL MEDICINE

## 2020-08-16 PROCEDURE — 2700000000 HC OXYGEN THERAPY PER DAY

## 2020-08-16 PROCEDURE — 2580000003 HC RX 258: Performed by: NURSE PRACTITIONER

## 2020-08-16 PROCEDURE — 2580000003 HC RX 258: Performed by: EMERGENCY MEDICINE

## 2020-08-16 PROCEDURE — 84300 ASSAY OF URINE SODIUM: CPT

## 2020-08-16 RX ORDER — INSULIN GLARGINE 100 [IU]/ML
15 INJECTION, SOLUTION SUBCUTANEOUS NIGHTLY
Status: DISCONTINUED | OUTPATIENT
Start: 2020-08-16 | End: 2020-08-17

## 2020-08-16 RX ORDER — ACETAMINOPHEN 325 MG/1
650 TABLET ORAL EVERY 6 HOURS PRN
Status: DISCONTINUED | OUTPATIENT
Start: 2020-08-15 | End: 2020-08-19 | Stop reason: HOSPADM

## 2020-08-16 RX ORDER — GABAPENTIN 600 MG/1
600 TABLET ORAL 3 TIMES DAILY
Status: DISCONTINUED | OUTPATIENT
Start: 2020-08-16 | End: 2020-08-16 | Stop reason: SDUPTHER

## 2020-08-16 RX ORDER — DEXTROSE MONOHYDRATE 50 MG/ML
100 INJECTION, SOLUTION INTRAVENOUS PRN
Status: DISCONTINUED | OUTPATIENT
Start: 2020-08-16 | End: 2020-08-19 | Stop reason: HOSPADM

## 2020-08-16 RX ORDER — GABAPENTIN 300 MG/1
600 CAPSULE ORAL 3 TIMES DAILY
Status: DISCONTINUED | OUTPATIENT
Start: 2020-08-16 | End: 2020-08-19 | Stop reason: HOSPADM

## 2020-08-16 RX ORDER — SERTRALINE HYDROCHLORIDE 100 MG/1
100 TABLET, FILM COATED ORAL DAILY
Status: DISCONTINUED | OUTPATIENT
Start: 2020-08-16 | End: 2020-08-19 | Stop reason: HOSPADM

## 2020-08-16 RX ORDER — POLYETHYLENE GLYCOL 3350 17 G/17G
17 POWDER, FOR SOLUTION ORAL DAILY PRN
Status: DISCONTINUED | OUTPATIENT
Start: 2020-08-15 | End: 2020-08-16

## 2020-08-16 RX ORDER — HYDROCODONE BITARTRATE AND ACETAMINOPHEN 5; 325 MG/1; MG/1
1 TABLET ORAL EVERY 8 HOURS PRN
Status: DISCONTINUED | OUTPATIENT
Start: 2020-08-16 | End: 2020-08-19 | Stop reason: HOSPADM

## 2020-08-16 RX ORDER — ONDANSETRON 2 MG/ML
4 INJECTION INTRAMUSCULAR; INTRAVENOUS EVERY 6 HOURS PRN
Status: DISCONTINUED | OUTPATIENT
Start: 2020-08-16 | End: 2020-08-19 | Stop reason: HOSPADM

## 2020-08-16 RX ORDER — ACETAMINOPHEN 650 MG/1
650 SUPPOSITORY RECTAL EVERY 6 HOURS PRN
Status: DISCONTINUED | OUTPATIENT
Start: 2020-08-15 | End: 2020-08-19 | Stop reason: HOSPADM

## 2020-08-16 RX ORDER — BUMETANIDE 0.25 MG/ML
1 INJECTION, SOLUTION INTRAMUSCULAR; INTRAVENOUS ONCE
Status: COMPLETED | OUTPATIENT
Start: 2020-08-16 | End: 2020-08-16

## 2020-08-16 RX ORDER — ASPIRIN 81 MG/1
81 TABLET ORAL DAILY
Status: DISCONTINUED | OUTPATIENT
Start: 2020-08-16 | End: 2020-08-19 | Stop reason: HOSPADM

## 2020-08-16 RX ORDER — ATORVASTATIN CALCIUM 40 MG/1
40 TABLET, FILM COATED ORAL DAILY
Status: DISCONTINUED | OUTPATIENT
Start: 2020-08-16 | End: 2020-08-19 | Stop reason: HOSPADM

## 2020-08-16 RX ORDER — PANTOPRAZOLE SODIUM 40 MG/10ML
40 INJECTION, POWDER, LYOPHILIZED, FOR SOLUTION INTRAVENOUS DAILY
Status: DISCONTINUED | OUTPATIENT
Start: 2020-08-16 | End: 2020-08-16 | Stop reason: SDUPTHER

## 2020-08-16 RX ORDER — INSULIN GLARGINE 100 [IU]/ML
10 INJECTION, SOLUTION SUBCUTANEOUS NIGHTLY
Status: DISCONTINUED | OUTPATIENT
Start: 2020-08-16 | End: 2020-08-16

## 2020-08-16 RX ORDER — FOLIC ACID 1 MG/1
1 TABLET ORAL DAILY
Status: DISCONTINUED | OUTPATIENT
Start: 2020-08-16 | End: 2020-08-19 | Stop reason: HOSPADM

## 2020-08-16 RX ORDER — CLOPIDOGREL BISULFATE 75 MG/1
75 TABLET ORAL DAILY
Status: DISCONTINUED | OUTPATIENT
Start: 2020-08-16 | End: 2020-08-19 | Stop reason: HOSPADM

## 2020-08-16 RX ORDER — PANTOPRAZOLE SODIUM 40 MG/1
40 TABLET, DELAYED RELEASE ORAL
Status: DISCONTINUED | OUTPATIENT
Start: 2020-08-16 | End: 2020-08-19 | Stop reason: HOSPADM

## 2020-08-16 RX ORDER — SODIUM CHLORIDE 9 MG/ML
10 INJECTION INTRAVENOUS DAILY
Status: DISCONTINUED | OUTPATIENT
Start: 2020-08-16 | End: 2020-08-16 | Stop reason: SDUPTHER

## 2020-08-16 RX ORDER — SODIUM CHLORIDE 0.9 % (FLUSH) 0.9 %
10 SYRINGE (ML) INJECTION EVERY 12 HOURS SCHEDULED
Status: DISCONTINUED | OUTPATIENT
Start: 2020-08-16 | End: 2020-08-19 | Stop reason: HOSPADM

## 2020-08-16 RX ORDER — DOCUSATE SODIUM 100 MG/1
100 CAPSULE, LIQUID FILLED ORAL DAILY PRN
Status: DISCONTINUED | OUTPATIENT
Start: 2020-08-16 | End: 2020-08-19 | Stop reason: HOSPADM

## 2020-08-16 RX ORDER — NICOTINE POLACRILEX 4 MG
15 LOZENGE BUCCAL PRN
Status: DISCONTINUED | OUTPATIENT
Start: 2020-08-16 | End: 2020-08-19 | Stop reason: HOSPADM

## 2020-08-16 RX ORDER — ALBUTEROL SULFATE 2.5 MG/3ML
2.5 SOLUTION RESPIRATORY (INHALATION) EVERY 6 HOURS PRN
Status: DISCONTINUED | OUTPATIENT
Start: 2020-08-16 | End: 2020-08-19 | Stop reason: HOSPADM

## 2020-08-16 RX ORDER — BUMETANIDE 0.25 MG/ML
2 INJECTION, SOLUTION INTRAMUSCULAR; INTRAVENOUS EVERY 12 HOURS
Status: DISCONTINUED | OUTPATIENT
Start: 2020-08-16 | End: 2020-08-16

## 2020-08-16 RX ORDER — SODIUM CHLORIDE 450 MG/100ML
INJECTION, SOLUTION INTRAVENOUS CONTINUOUS
Status: DISCONTINUED | OUTPATIENT
Start: 2020-08-16 | End: 2020-08-16

## 2020-08-16 RX ORDER — POLYETHYLENE GLYCOL 3350 17 G/17G
17 POWDER, FOR SOLUTION ORAL ONCE
Status: DISCONTINUED | OUTPATIENT
Start: 2020-08-16 | End: 2020-08-19 | Stop reason: HOSPADM

## 2020-08-16 RX ORDER — SODIUM CHLORIDE 0.9 % (FLUSH) 0.9 %
10 SYRINGE (ML) INJECTION PRN
Status: DISCONTINUED | OUTPATIENT
Start: 2020-08-15 | End: 2020-08-19 | Stop reason: HOSPADM

## 2020-08-16 RX ORDER — METOPROLOL TARTRATE 50 MG/1
100 TABLET, FILM COATED ORAL 2 TIMES DAILY
Status: DISCONTINUED | OUTPATIENT
Start: 2020-08-16 | End: 2020-08-19 | Stop reason: HOSPADM

## 2020-08-16 RX ORDER — DEXTROSE MONOHYDRATE 25 G/50ML
12.5 INJECTION, SOLUTION INTRAVENOUS PRN
Status: DISCONTINUED | OUTPATIENT
Start: 2020-08-16 | End: 2020-08-19 | Stop reason: HOSPADM

## 2020-08-16 RX ORDER — AMLODIPINE BESYLATE 2.5 MG/1
2.5 TABLET ORAL DAILY
Status: DISCONTINUED | OUTPATIENT
Start: 2020-08-16 | End: 2020-08-19 | Stop reason: HOSPADM

## 2020-08-16 RX ADMIN — ASPIRIN 81 MG: 81 TABLET, COATED ORAL at 08:29

## 2020-08-16 RX ADMIN — CEFTRIAXONE SODIUM 1 G: 1 INJECTION, POWDER, FOR SOLUTION INTRAMUSCULAR; INTRAVENOUS at 08:29

## 2020-08-16 RX ADMIN — INSULIN LISPRO 3 UNITS: 100 INJECTION, SOLUTION INTRAVENOUS; SUBCUTANEOUS at 18:02

## 2020-08-16 RX ADMIN — SERTRALINE HYDROCHLORIDE 100 MG: 100 TABLET ORAL at 08:29

## 2020-08-16 RX ADMIN — CLOPIDOGREL 75 MG: 75 TABLET, FILM COATED ORAL at 08:29

## 2020-08-16 RX ADMIN — INSULIN LISPRO 9 UNITS: 100 INJECTION, SOLUTION INTRAVENOUS; SUBCUTANEOUS at 21:19

## 2020-08-16 RX ADMIN — SODIUM CHLORIDE: 4.5 INJECTION, SOLUTION INTRAVENOUS at 11:06

## 2020-08-16 RX ADMIN — GABAPENTIN 600 MG: 300 CAPSULE ORAL at 08:29

## 2020-08-16 RX ADMIN — INSULIN GLARGINE 10 UNITS: 100 INJECTION, SOLUTION SUBCUTANEOUS at 04:02

## 2020-08-16 RX ADMIN — INSULIN LISPRO 3 UNITS: 100 INJECTION, SOLUTION INTRAVENOUS; SUBCUTANEOUS at 04:03

## 2020-08-16 RX ADMIN — BUMETANIDE 1 MG: 0.25 INJECTION INTRAMUSCULAR; INTRAVENOUS at 04:08

## 2020-08-16 RX ADMIN — BUMETANIDE 2 MG: 0.25 INJECTION, SOLUTION INTRAMUSCULAR; INTRAVENOUS at 03:30

## 2020-08-16 RX ADMIN — GABAPENTIN 600 MG: 300 CAPSULE ORAL at 21:18

## 2020-08-16 RX ADMIN — Medication 10 ML: at 21:20

## 2020-08-16 RX ADMIN — GABAPENTIN 600 MG: 300 CAPSULE ORAL at 14:10

## 2020-08-16 RX ADMIN — HYDROCODONE BITARTRATE AND ACETAMINOPHEN 1 TABLET: 5; 325 TABLET ORAL at 18:00

## 2020-08-16 RX ADMIN — ATORVASTATIN CALCIUM 40 MG: 40 TABLET, FILM COATED ORAL at 08:29

## 2020-08-16 RX ADMIN — INSULIN LISPRO 3 UNITS: 100 INJECTION, SOLUTION INTRAVENOUS; SUBCUTANEOUS at 11:22

## 2020-08-16 RX ADMIN — SODIUM CHLORIDE 100 ML/HR: 9 INJECTION, SOLUTION INTRAVENOUS at 03:08

## 2020-08-16 RX ADMIN — INSULIN LISPRO 5 UNITS: 100 INJECTION, SOLUTION INTRAVENOUS; SUBCUTANEOUS at 18:00

## 2020-08-16 RX ADMIN — FOLIC ACID 1 MG: 1 TABLET ORAL at 08:29

## 2020-08-16 RX ADMIN — INSULIN GLARGINE 15 UNITS: 100 INJECTION, SOLUTION SUBCUTANEOUS at 21:18

## 2020-08-16 RX ADMIN — Medication 10 ML: at 08:30

## 2020-08-16 RX ADMIN — PANTOPRAZOLE SODIUM 40 MG: 40 TABLET, DELAYED RELEASE ORAL at 06:36

## 2020-08-16 RX ADMIN — ENOXAPARIN SODIUM 40 MG: 40 INJECTION SUBCUTANEOUS at 08:30

## 2020-08-16 ASSESSMENT — PAIN SCALES - GENERAL
PAINLEVEL_OUTOF10: 0
PAINLEVEL_OUTOF10: 7
PAINLEVEL_OUTOF10: 7
PAINLEVEL_OUTOF10: 0
PAINLEVEL_OUTOF10: 7
PAINLEVEL_OUTOF10: 6

## 2020-08-16 ASSESSMENT — PAIN - FUNCTIONAL ASSESSMENT: PAIN_FUNCTIONAL_ASSESSMENT: ACTIVITIES ARE NOT PREVENTED

## 2020-08-16 ASSESSMENT — PAIN DESCRIPTION - FREQUENCY: FREQUENCY: INTERMITTENT

## 2020-08-16 ASSESSMENT — PAIN DESCRIPTION - DESCRIPTORS: DESCRIPTORS: OTHER (COMMENT)

## 2020-08-16 ASSESSMENT — PAIN DESCRIPTION - LOCATION: LOCATION: LEG

## 2020-08-16 ASSESSMENT — PAIN DESCRIPTION - PAIN TYPE: TYPE: CHRONIC PAIN

## 2020-08-16 ASSESSMENT — PAIN DESCRIPTION - ORIENTATION: ORIENTATION: LEFT;RIGHT

## 2020-08-16 ASSESSMENT — PAIN DESCRIPTION - PROGRESSION: CLINICAL_PROGRESSION: NOT CHANGED

## 2020-08-16 ASSESSMENT — PAIN DESCRIPTION - ONSET: ONSET: ON-GOING

## 2020-08-16 NOTE — PROGRESS NOTES
Date: 8/16/2020    Time: 1:25 AM    Patient Placed On BIPAP/CPAP/ Non-Invasive Ventilation? Yes    If no must comment. Facial area red/color change? No           If YES are Blister/Lesion present? No   If yes must notify nursing staff  BIPAP/CPAP skin barrier?   Yes    Skin barrier type:mepilex       Comments:        Dylan Langston

## 2020-08-16 NOTE — PROGRESS NOTES
Unable to assess BiPAP d/t patient unavailability at this time. Patient is not in room at this time.

## 2020-08-16 NOTE — CONSULTS
Lopressor, Norvac, Imdur, metofrmin           Past Medical History:        Diagnosis Date    MATTHEW (acute kidney injury) ([address]     Anemia     Aorto-iliac atherosclerosis ([address] 3/15/2016    Atherosclerosis of native arteries of left leg with ulceration of other part of foot ([address] 5/10/2017    Atherosclerosis of native arteries of the extremities with gangrene ([address] 6/20/2017    Atherosclerosis of native arteries of the extremities with ulceration(440.23) 2/21/2014    Atherosclerosis of native artery of left lower extremity with ulceration ([address] 2/26/2017    Blood transfusion reaction     CAD (coronary artery disease)     Charcot's joint of foot due to diabetes ([address] 2/21/2014    CHF (congestive heart failure) (HCC)     Chronic osteomyelitis, ankle and foot     Depression     Diabetic neuropathy (HCC)     Diabetic ulcer of left foot associated with type 1 diabetes mellitus ([address] 2/26/2017    Femoral-popliteal atherosclerosis ([address] 3/15/2016    Foot ulceration ([address] 10/28/2014    History of blood transfusion     HTN (hypertension)     Hyperlipidemia     Ischemic cardiomyopathy     EF 40%    Mitral regurgitation     Non-pressure chronic ulcer of left lower leg with fat layer exposed ([address] 6/29/2016    PVD (peripheral vascular disease) ([address]     Systolic heart failure ([address] 10/20/13    10/20/13-echocardiogram revealed an LVEF of 35%    Type II or unspecified type diabetes mellitus with other specified manifestations, not stated as uncontrolled     Ulcer of leg due to secondary diabetes ([address] 3/15/2016    Ulcer of other part of foot     Unspecified diseases of blood and blood-forming organs      Past Surgical History:        Procedure Laterality Date    ABDOMEN SURGERY      ABOVE KNEE AMPUTATION Left 06/23/2017    with Dr. Darlin Jones, ARTERY Right 12/18/2013    R SFA 6x200 mm, [place]    CARDIAC CATHETERIZATION  12/2012    [facility] CARDIAC CATHETERIZATION  5/29/2013    Dr. Ingris Aguillon  06/22/2017    Dr. Trinity Damian      x 3    ECHO COMPL W DOP COLOR FLOW  9/22/2012 9/22/2012-echocardiogram revealed an LVEF of 60%    ECHO COMPL W DOP COLOR FLOW  1/28/2013         ECHO COMPL W DOP COLOR FLOW  10/20/2013         ECHO COMPLETE  12/13/2013         FOOT SURGERY  12/15/2013    incision and drainage with bone biopsy    LEG AMPUTATION BELOW KNEE Right 2/27/14    right below the knee amp    TOE AMPUTATION      right foot    TONSILLECTOMY      TRANSESOPHAGEAL ECHOCARDIOGRAM  10/23/2013         VASCULAR SURGERY       Current Medications:    Current Facility-Administered Medications: cefTRIAXone (ROCEPHIN) 1 g in sterile water 10 mL IV syringe, 1 g, Intravenous, Daily  sodium chloride flush 0.9 % injection 10 mL, 10 mL, Intravenous, 2 times per day  sodium chloride flush 0.9 % injection 10 mL, 10 mL, Intravenous, PRN  acetaminophen (TYLENOL) tablet 650 mg, 650 mg, Oral, Q6H PRN **OR** acetaminophen (TYLENOL) suppository 650 mg, 650 mg, Rectal, Q6H PRN  enoxaparin (LOVENOX) injection 40 mg, 40 mg, Subcutaneous, Daily  ondansetron (ZOFRAN) injection 4 mg, 4 mg, Intravenous, Q6H PRN  [Held by provider] amLODIPine (NORVASC) tablet 2.5 mg, 2.5 mg, Oral, Daily  aspirin EC tablet 81 mg, 81 mg, Oral, Daily  atorvastatin (LIPITOR) tablet 40 mg, 40 mg, Oral, Daily  clopidogrel (PLAVIX) tablet 75 mg, 75 mg, Oral, Daily  docusate sodium (COLACE) capsule 100 mg, 100 mg, Oral, Daily PRN  folic acid (FOLVITE) tablet 1 mg, 1 mg, Oral, Daily  [Held by provider] metoprolol tartrate (LOPRESSOR) tablet 100 mg, 100 mg, Oral, BID  pantoprazole (PROTONIX) tablet 40 mg, 40 mg, Oral, QAM AC  sertraline (ZOLOFT) tablet 100 mg, 100 mg, Oral, Daily  glucose (GLUTOSE) 40 % oral gel 15 g, 15 g, Oral, PRN  dextrose 50 % IV solution, 12.5 g, Intravenous, PRN  glucagon (rDNA) injection 1 mg, 1 mg, Intramuscular, PRN  dextrose 5 % solution, 100 mL/hr, Intravenous, PRN  insulin lispro (HUMALOG) injection vial 0-18 Units, 0-18 Units, Subcutaneous, Q4H  polyethylene glycol (GLYCOLAX) packet 17 g, 17 g, Oral, Once  insulin glargine (LANTUS) injection vial 10 Units, 10 Units, Subcutaneous, Nightly  perflutren lipid microspheres (DEFINITY) injection 1.65 mg, 1.5 mL, Intravenous, ONCE PRN  albuterol (PROVENTIL) nebulizer solution 2.5 mg, 2.5 mg, Nebulization, Q6H PRN  bumetanide (BUMEX) injection 2 mg, 2 mg, Intravenous, Q12H  gabapentin (NEURONTIN) capsule 600 mg, 600 mg, Oral, TID  0.9 % sodium chloride infusion, , Intravenous, Continuous  albuterol (PROVENTIL) nebulizer solution 10 mg, 10 mg, Nebulization, Once  Allergies:  Effexor [venlafaxine hcl]    Social History:    TOBACCO:   reports that she quit smoking about 17 years ago. Her smoking use included cigarettes. She has a 20.00 pack-year smoking history. She has never used smokeless tobacco.  ETOH:   reports no history of alcohol use. DRUGS:   reports no history of drug use. Family History:       Problem Relation Age of Onset    Diabetes Mother     Hypertension Mother     Diabetes Father     Hypertension Father     Heart Failure Father     Cancer Brother      REVIEW OF SYSTEMS:    · Constitutional: No fever, no chills, no change in weight; good appetite  · HEENT: No blurred vision, no ear problems, no sore throat, no rhinorrhea. · Respiratory: No cough, no sputum production, no pleuritic chest pain, no shortness of breath  · Cardiology: No angina, no dyspnea on exertion, no paroxysmal nocturnal dyspnea, no orthopnea, no palpitation, no leg swelling. · Gastroenterology: No dysphagia, no reflux; no abdominal pain, no nausea or vomiting; no constipation or diarrhea.  No hematochezia   · Genitourinary: No dysuria, no frequency, hesitancy; no hematuria  · Musculoskeletal: no joint pain, no myalgia, no change in range of movement  · Neurology: no focal weakness in extremities, no slurred speech, no double vision, no tingling or numbness sensation  · Endocrinology: no temperature intolerance, no polyphagia, polydipsia or polyuria  · Hematology: no increased bleeding, no bruising, no lymphadenopathy  · Skin: no skin changes noticed by patient  · Psychology: no depressed mood, no suicidal ideation    PHYSICAL EXAM:      Vitals:    VITALS:  BP (!) 151/69   Pulse 74   Temp 97.8 °F (36.6 °C) (Oral)   Resp 20   Ht 5' 2\" (1.575 m)   Wt 215 lb (97.5 kg)   SpO2 95%   BMI 39.32 kg/m²   24HR INTAKE/OUTPUT:      Intake/Output Summary (Last 24 hours) at 8/16/2020 0908  Last data filed at 8/16/2020 0543  Gross per 24 hour   Intake 800 ml   Output 2075 ml   Net -1275 ml       Constitutional: lethargic, weak. Patient is alert oriented X 3 but falling asleep   HEENT: Pupils are equal reactive  Respiratory: Decreased breath sounds at the bases, 3L oxygen at home  Cardiovascular/Edema: Heart sounds are regular rate  Gastrointestinal: Abdomen soft, nontender, distended, contreras cateter  Neurologic: Nonfocal  Skin: No lesions  Other: +trace Edema , right BKA and left AKA    DATA:    CBC with Differential:    Lab Results   Component Value Date    WBC 8.3 08/16/2020    RBC 4.00 08/16/2020    HGB 12.5 08/16/2020    HCT 40.3 08/16/2020     08/16/2020    .8 08/16/2020    MCH 31.3 08/16/2020    MCHC 31.0 08/16/2020    RDW 14.0 08/16/2020    NRBC 1 09/21/2012    SEGSPCT 50 03/21/2014    LYMPHOPCT 12.4 08/16/2020    MONOPCT 4.7 08/16/2020    BASOPCT 0.5 08/16/2020    MONOSABS 0.39 08/16/2020    LYMPHSABS 1.03 08/16/2020    EOSABS 0.32 08/16/2020    BASOSABS 0.04 08/16/2020     CMP:    Lab Results   Component Value Date     08/16/2020    K 5.2 08/16/2020    K 6.6 08/15/2020     08/16/2020    CO2 24 08/16/2020    BUN 34 08/16/2020    CREATININE 1.2 08/16/2020    GFRAA 55 08/16/2020    LABGLOM 46 08/16/2020    GLUCOSE 143 08/16/2020    GLUCOSE 264 04/17/2012    PROT 5.6 08/15/2020    LABALBU 3.3 08/15/2020    LABALBU 4.3 04/16/2012    CALCIUM 9.1 08/16/2020    BILITOT 0.3 08/15/2020    ALKPHOS 60 08/15/2020    AST 12 08/15/2020    ALT 15 08/15/2020     Magnesium:    Lab Results   Component Value Date    MG 1.5 08/15/2020     Phosphorus:    Lab Results   Component Value Date    PHOS 4.2 12/18/2019     Urine Studies:   Chloride: 70   Creatinine: 80   Potassium: 38.3   Sodium 97   Urea Nitrogen: 688        Radiology Review:          Chest xray 8/15/20   Diffuse groundglass opacities scattered throughout the    lungs bilaterally in the setting of cardiomegaly, likely representing    pulmonary edema. CT head wo contrast 8/15/20   Diffuse atrophy likely age related    Findings compatible with small vessel ischemic changes.             CT chest wo contrast 8/15/20   No suspicious pulmonary nodules or masses.         Trace right pleural effusion.               IMPRESSION/RECOMMENDATIONS:      1. Recurrent MATTHEW stage I, probably hemodynamically mediated MATTHEW due to Ace inhibition, GI losses (disrrhea) and diuretics. Baseline 0.7mg/dL. renal function improved with IVF  1. FENa calculated at 1.0%- prerenal versus iinterphase  ATN  2. AMS 2/2 acute cystitis vs electrolyte abnormalities vs hypercapnia  3. Acute Hypoxic hypercapnic respiratory failure- acute pulm edema and diffuse ground glass opacities on chest xray, trace right pleural effusion on CT chest  4. Acute Cystitis- urinalysis positive for nitrates, leukocyte esterase, 35 hyaline cast, WBC 10-20, on Ceftriaxone  5. HyperKalemia: 6.2, 2/2 decreased GFR/MATTHEW. 6.2>5.4>5.2  6. Hypomagnesemia: likely due to GI losses/poor oral intake  7. HFrEF 38%  8. Anemia  9. Hyperkalemia- multifactorial/prerenal, decrease GFR, ACE inhibition  10. Nutrition: Renal, low sodium.         Plan:  · Obtain Renal Ultrasound  · Repeat BMP every 12 hours  · Obtain urine electrolytes, protein/creat ratio, microalbumin/creat ratio, osmo  · Change IVF to 0.45%NS at 100ml/hr   · Hold Lisinopril and metformin  · Strict I&Os  · Low K diet  · Continue to monitor renal function      Thank you very much Dr. Tammy Ortiz MD for allowing us to participate in the care of Meir Meadows.     Jorge Evans MD

## 2020-08-16 NOTE — PROGRESS NOTES
Received to unit approx at 9 pm drowsy alert some what O2 n/c 2 l. N/S infusing rt f/a @ gm Mag hanging on pole not completed. Restarted infusion. Rt BKA left AKA. Doctor here and seen wants Bipap from [id number] Lang Portillo

## 2020-08-16 NOTE — PROGRESS NOTES
[facility]  Internal Medicine Residency / House Medicine Service      Initial H and P    Attending Physician Statement  I have discussed the case, including pertinent history and exam findings with the resident and the team. I have reviewed all significant past medical history, surgical history, social history, family history, allergies, and home medications independently. I have seen and examined the patient and the key elements of the encounter have been performed by me. I agree with the assessment, plan and orders as documented by the resident.       Case Discussed During AM Rounds   Admitted overnight with increased weakness, fatigue and found to have MATTHEW on CKD in the presence of refractory hyperkalemia    Aggressive hyperkalemia work-up completed    K this am 5.2   Cr improving with management including 24 hour diuresis with high volume output    Also found to have lethargy with stat ABG suggesting acute hypercapnic respiratory failure   Feeling much improved with therapy    Abdominal distention noted- consider U/S of abdomen    Hold further diuresis and follow to avoid dehydration        Acute Hypercapnic Respiratory Failure    Improved with diuresis and management of electrolyte abnormalities    Continue supplemental oxygen   Monitor respiratory status closely    Consider repeat diuresis as appropriate     Encephalopathy- multifactorial   Metabolic disturbances noted on admission   Improved with management   Continue to monitor- currently A & O x 3    Hyperkalemia- improving   Nephrology following    Avoid nephrotoxic agents   HOLD ACEI    Repeat BMP this afternoon     Acute on Chronic Kidney Disease    Clinically improving with fluid resuscitation    Some signs of volume concerns   Nephrology following     Acute Cystitis     Check Urine Cx   Continue rocephin and follow     Type II DM    Continue current management    Disposition- continue inpatient management     Remainder of medical problems as per resident note.       Francisco Ny  Internal Medicine Residency Faculty

## 2020-08-17 LAB
ANION GAP SERPL CALCULATED.3IONS-SCNC: 13 MMOL/L (ref 7–16)
ANION GAP SERPL CALCULATED.3IONS-SCNC: 14 MMOL/L (ref 7–16)
ANION GAP SERPL CALCULATED.3IONS-SCNC: 14 MMOL/L (ref 7–16)
BUN BLDV-MCNC: 22 MG/DL (ref 8–23)
BUN BLDV-MCNC: 24 MG/DL (ref 8–23)
BUN BLDV-MCNC: 24 MG/DL (ref 8–23)
CALCIUM SERPL-MCNC: 9.1 MG/DL (ref 8.6–10.2)
CALCIUM SERPL-MCNC: 9.2 MG/DL (ref 8.6–10.2)
CALCIUM SERPL-MCNC: 9.2 MG/DL (ref 8.6–10.2)
CHLORIDE BLD-SCNC: 100 MMOL/L (ref 98–107)
CHLORIDE BLD-SCNC: 103 MMOL/L (ref 98–107)
CHLORIDE BLD-SCNC: 103 MMOL/L (ref 98–107)
CO2: 23 MMOL/L (ref 22–29)
CO2: 25 MMOL/L (ref 22–29)
CO2: 25 MMOL/L (ref 22–29)
CREAT SERPL-MCNC: 0.9 MG/DL (ref 0.5–1)
CREAT SERPL-MCNC: 1 MG/DL (ref 0.5–1)
CREAT SERPL-MCNC: 1 MG/DL (ref 0.5–1)
EKG ATRIAL RATE: 73 BPM
EKG ATRIAL RATE: 81 BPM
EKG P AXIS: 133 DEGREES
EKG P AXIS: 67 DEGREES
EKG P-R INTERVAL: 176 MS
EKG P-R INTERVAL: 186 MS
EKG Q-T INTERVAL: 392 MS
EKG Q-T INTERVAL: 402 MS
EKG QRS DURATION: 116 MS
EKG QRS DURATION: 118 MS
EKG QTC CALCULATION (BAZETT): 442 MS
EKG QTC CALCULATION (BAZETT): 455 MS
EKG R AXIS: 108 DEGREES
EKG R AXIS: 87 DEGREES
EKG T AXIS: -117 DEGREES
EKG T AXIS: -55 DEGREES
EKG VENTRICULAR RATE: 73 BPM
EKG VENTRICULAR RATE: 81 BPM
GFR AFRICAN AMERICAN: >60
GFR NON-AFRICAN AMERICAN: 56 ML/MIN/1.73
GFR NON-AFRICAN AMERICAN: 56 ML/MIN/1.73
GFR NON-AFRICAN AMERICAN: >60 ML/MIN/1.73
GLUCOSE BLD-MCNC: 255 MG/DL (ref 74–99)
GLUCOSE BLD-MCNC: 269 MG/DL (ref 74–99)
GLUCOSE BLD-MCNC: 280 MG/DL (ref 74–99)
MAGNESIUM: 1.6 MG/DL (ref 1.6–2.6)
METER GLUCOSE: 162 MG/DL (ref 74–99)
METER GLUCOSE: 254 MG/DL (ref 74–99)
METER GLUCOSE: 255 MG/DL (ref 74–99)
METER GLUCOSE: 308 MG/DL (ref 74–99)
POTASSIUM SERPL-SCNC: 4.7 MMOL/L (ref 3.5–5)
POTASSIUM SERPL-SCNC: 4.8 MMOL/L (ref 3.5–5)
POTASSIUM SERPL-SCNC: 5.1 MMOL/L (ref 3.5–5)
SARS-COV-2: NOT DETECTED
SODIUM BLD-SCNC: 139 MMOL/L (ref 132–146)
SODIUM BLD-SCNC: 140 MMOL/L (ref 132–146)
SODIUM BLD-SCNC: 141 MMOL/L (ref 132–146)
SOURCE: NORMAL
TROPONIN: <0.01 NG/ML (ref 0–0.03)

## 2020-08-17 PROCEDURE — 2140000000 HC CCU INTERMEDIATE R&B

## 2020-08-17 PROCEDURE — 36415 COLL VENOUS BLD VENIPUNCTURE: CPT

## 2020-08-17 PROCEDURE — 6370000000 HC RX 637 (ALT 250 FOR IP): Performed by: INTERNAL MEDICINE

## 2020-08-17 PROCEDURE — 6360000002 HC RX W HCPCS: Performed by: INTERNAL MEDICINE

## 2020-08-17 PROCEDURE — 84484 ASSAY OF TROPONIN QUANT: CPT

## 2020-08-17 PROCEDURE — 93010 ELECTROCARDIOGRAM REPORT: CPT | Performed by: INTERNAL MEDICINE

## 2020-08-17 PROCEDURE — 93005 ELECTROCARDIOGRAM TRACING: CPT | Performed by: INTERNAL MEDICINE

## 2020-08-17 PROCEDURE — 94660 CPAP INITIATION&MGMT: CPT

## 2020-08-17 PROCEDURE — 2580000003 HC RX 258: Performed by: INTERNAL MEDICINE

## 2020-08-17 PROCEDURE — 80048 BASIC METABOLIC PNL TOTAL CA: CPT

## 2020-08-17 PROCEDURE — 82962 GLUCOSE BLOOD TEST: CPT

## 2020-08-17 RX ORDER — INSULIN GLARGINE 100 [IU]/ML
30 INJECTION, SOLUTION SUBCUTANEOUS NIGHTLY
Status: DISCONTINUED | OUTPATIENT
Start: 2020-08-17 | End: 2020-08-18

## 2020-08-17 RX ORDER — RANOLAZINE 500 MG/1
1000 TABLET, EXTENDED RELEASE ORAL 2 TIMES DAILY
Status: DISCONTINUED | OUTPATIENT
Start: 2020-08-17 | End: 2020-08-19 | Stop reason: HOSPADM

## 2020-08-17 RX ORDER — RANOLAZINE 500 MG/1
1000 TABLET, EXTENDED RELEASE ORAL 2 TIMES DAILY
Status: CANCELLED | OUTPATIENT
Start: 2020-08-17

## 2020-08-17 RX ORDER — NITROGLYCERIN 0.4 MG/1
0.4 TABLET SUBLINGUAL ONCE
Status: COMPLETED | OUTPATIENT
Start: 2020-08-17 | End: 2020-08-17

## 2020-08-17 RX ADMIN — GABAPENTIN 600 MG: 300 CAPSULE ORAL at 13:54

## 2020-08-17 RX ADMIN — HYDROCODONE BITARTRATE AND ACETAMINOPHEN 1 TABLET: 5; 325 TABLET ORAL at 03:19

## 2020-08-17 RX ADMIN — INSULIN LISPRO 9 UNITS: 100 INJECTION, SOLUTION INTRAVENOUS; SUBCUTANEOUS at 12:14

## 2020-08-17 RX ADMIN — ASPIRIN 81 MG: 81 TABLET, COATED ORAL at 09:35

## 2020-08-17 RX ADMIN — PANTOPRAZOLE SODIUM 40 MG: 40 TABLET, DELAYED RELEASE ORAL at 05:31

## 2020-08-17 RX ADMIN — Medication 10 ML: at 20:00

## 2020-08-17 RX ADMIN — INSULIN LISPRO 9 UNITS: 100 INJECTION, SOLUTION INTRAVENOUS; SUBCUTANEOUS at 20:21

## 2020-08-17 RX ADMIN — ATORVASTATIN CALCIUM 40 MG: 40 TABLET, FILM COATED ORAL at 09:35

## 2020-08-17 RX ADMIN — CLOPIDOGREL 75 MG: 75 TABLET, FILM COATED ORAL at 09:35

## 2020-08-17 RX ADMIN — RANOLAZINE 1000 MG: 500 TABLET, FILM COATED, EXTENDED RELEASE ORAL at 20:00

## 2020-08-17 RX ADMIN — NITROGLYCERIN 0.4 MG: 0.4 TABLET, ORALLY DISINTEGRATING SUBLINGUAL at 03:09

## 2020-08-17 RX ADMIN — Medication 10 ML: at 09:36

## 2020-08-17 RX ADMIN — INSULIN LISPRO 3 UNITS: 100 INJECTION, SOLUTION INTRAVENOUS; SUBCUTANEOUS at 17:09

## 2020-08-17 RX ADMIN — FOLIC ACID 1 MG: 1 TABLET ORAL at 09:36

## 2020-08-17 RX ADMIN — GABAPENTIN 600 MG: 300 CAPSULE ORAL at 09:35

## 2020-08-17 RX ADMIN — INSULIN LISPRO 8 UNITS: 100 INJECTION, SOLUTION INTRAVENOUS; SUBCUTANEOUS at 17:10

## 2020-08-17 RX ADMIN — INSULIN LISPRO 8 UNITS: 100 INJECTION, SOLUTION INTRAVENOUS; SUBCUTANEOUS at 12:13

## 2020-08-17 RX ADMIN — INSULIN LISPRO 12 UNITS: 100 INJECTION, SOLUTION INTRAVENOUS; SUBCUTANEOUS at 05:40

## 2020-08-17 RX ADMIN — ACETAMINOPHEN 650 MG: 325 TABLET, FILM COATED ORAL at 20:01

## 2020-08-17 RX ADMIN — INSULIN GLARGINE 30 UNITS: 100 INJECTION, SOLUTION SUBCUTANEOUS at 20:22

## 2020-08-17 RX ADMIN — METOPROLOL TARTRATE 100 MG: 50 TABLET, FILM COATED ORAL at 20:00

## 2020-08-17 RX ADMIN — ENOXAPARIN SODIUM 40 MG: 40 INJECTION SUBCUTANEOUS at 09:36

## 2020-08-17 RX ADMIN — INSULIN LISPRO 8 UNITS: 100 INJECTION, SOLUTION INTRAVENOUS; SUBCUTANEOUS at 09:37

## 2020-08-17 RX ADMIN — SERTRALINE HYDROCHLORIDE 100 MG: 100 TABLET ORAL at 09:35

## 2020-08-17 RX ADMIN — GABAPENTIN 600 MG: 300 CAPSULE ORAL at 20:01

## 2020-08-17 RX ADMIN — CEFTRIAXONE SODIUM 1 G: 1 INJECTION, POWDER, FOR SOLUTION INTRAMUSCULAR; INTRAVENOUS at 09:36

## 2020-08-17 RX ADMIN — HYDROCODONE BITARTRATE AND ACETAMINOPHEN 1 TABLET: 5; 325 TABLET ORAL at 16:57

## 2020-08-17 ASSESSMENT — PAIN DESCRIPTION - FREQUENCY
FREQUENCY: INTERMITTENT
FREQUENCY: INTERMITTENT

## 2020-08-17 ASSESSMENT — PAIN DESCRIPTION - PAIN TYPE
TYPE: CHRONIC PAIN
TYPE: CHRONIC PAIN

## 2020-08-17 ASSESSMENT — PAIN DESCRIPTION - ORIENTATION: ORIENTATION: RIGHT;LEFT

## 2020-08-17 ASSESSMENT — PAIN DESCRIPTION - ONSET
ONSET: SUDDEN
ONSET: PROGRESSIVE

## 2020-08-17 ASSESSMENT — PAIN DESCRIPTION - DESCRIPTORS
DESCRIPTORS: NUMBNESS;TINGLING
DESCRIPTORS: NUMBNESS;TINGLING

## 2020-08-17 ASSESSMENT — PAIN SCALES - GENERAL
PAINLEVEL_OUTOF10: 2
PAINLEVEL_OUTOF10: 0
PAINLEVEL_OUTOF10: 0
PAINLEVEL_OUTOF10: 6
PAINLEVEL_OUTOF10: 0
PAINLEVEL_OUTOF10: 0
PAINLEVEL_OUTOF10: 7
PAINLEVEL_OUTOF10: 0

## 2020-08-17 ASSESSMENT — PAIN DESCRIPTION - LOCATION
LOCATION: HAND
LOCATION: HAND

## 2020-08-17 NOTE — PROGRESS NOTES
Received pt alert and oriented, denies any pain or discomfort at this time, no clinica s/s of any distress, pt placed on contact and droplet plus isolation

## 2020-08-17 NOTE — PLAN OF CARE
Problem: Skin Integrity:  Goal: Will show no infection signs and symptoms  Description: Will show no infection signs and symptoms  Outcome: Met This Shift     Problem: Falls - Risk of:  Goal: Will remain free from falls  Description: Will remain free from falls  Outcome: Met This Shift

## 2020-08-17 NOTE — PROGRESS NOTES
Informed Dr. Lorenzo Sandifer whose on call that Dr. Gaurav Riddle ordered a covid test on patient but she had one done on 8/15 and results are pending because it was a mail out, confirmed with micro lab.

## 2020-08-17 NOTE — PROGRESS NOTES
Spoke with Leticia in infection control about alert for Covid and spoke with Dr Faviola Baker from [facility] about it also and will speak with team and call back

## 2020-08-17 NOTE — PROGRESS NOTES
Department of Internal Medicine  Nephrology Nurse Practitioner Consult Note    Events reviewed. SUBJECTIVE : We are following Mrs. Marroquin for MATTHEW. She reports no complaints.      PHYSICAL EXAM:      Vitals:    VITALS:  /60   Pulse 79   Temp 97.1 °F (36.2 °C) (Infrared)   Resp 16   Ht 5' 2\" (1.575 m)   Wt 213 lb 8 oz (96.8 kg)   SpO2 91%   BMI 39.05 kg/m²   24HR INTAKE/OUTPUT:      Intake/Output Summary (Last 24 hours) at 8/17/2020 1327  Last data filed at 8/17/2020 0357  Gross per 24 hour   Intake 861.67 ml   Output 2700 ml   Net -1838.33 ml       Constitutional: Alert and oriented, in no distress   HEENT: PERRL, normocephalic  Respiratory: CTA bilaterally, 3L O2 via nasal cannula  Cardiovascular/Edema: RRR, S1/S2  Gastrointestinal: Abdomen soft, nontender, nondistended,  contreras cateter patent and draining  Neurologic: Nonfocal  Skin: No lesions  Other: +trace Edema , right BKA and left AKA    Scheduled Meds:   insulin glargine  30 Units Subcutaneous Nightly    insulin lispro  8 Units Subcutaneous TID WC    cefTRIAXone (ROCEPHIN) IV  1 g Intravenous Daily    sodium chloride flush  10 mL Intravenous 2 times per day    enoxaparin  40 mg Subcutaneous Daily    [Held by provider] amLODIPine  2.5 mg Oral Daily    aspirin  81 mg Oral Daily    atorvastatin  40 mg Oral Daily    clopidogrel  75 mg Oral Daily    folic acid  1 mg Oral Daily    metoprolol  100 mg Oral BID    pantoprazole  40 mg Oral QAM AC    sertraline  100 mg Oral Daily    polyethylene glycol  17 g Oral Once    gabapentin  600 mg Oral TID    insulin lispro  0-18 Units Subcutaneous 4x Daily AC & HS    albuterol  10 mg Nebulization Once     Continuous Infusions:   dextrose       PRN Meds:.sodium chloride flush, acetaminophen **OR** acetaminophen, ondansetron, docusate sodium, glucose, dextrose, glucagon (rDNA), dextrose, perflutren lipid microspheres, albuterol, HYDROcodone-acetaminophen    DATA:    CBC with Differential:    Lab 56-year-old female with a PMH of HTN, type II DM, hyperlipidemia, CAD, HFrEF 38%, PVD status post right BKA and left AKA, neuropathy, CAD, heart failure with reduced ejection fraction, anemia who presented to the ED on 8/15/2020 with complaints of weakness and fatigue x 3 days. She also reported that she had watery diarrhea x 3 days and dark urine at home. Labs obtained in the ED were remarkable for potassium of 6.2, BUN 46, creatinine 1.7, ProBNP 1420, hemoglobin 12.3, UA + for trace ketones, nitrites and leukocytes. She was treated for hyperkalemia, given 2L normal saline and ceftriaxone prior to admission. We are consulted for MATTHEW. Pertinent Home medications include: Bumex, Lisinopril, Lopressor, Norvac, Imdur, metformin. IMPRESSION/RECOMMENDATIONS:      1. MATTHEW stage I, etiology unclear, volume responsive prerenal MATTHEW due to diarrhea and decreased PO intake in the setting of diuretics and ACE inhibition versus hemodynamically mediated prerenal MATTHEW in the setting of decompensated heart failure (cardiorenal syndrome). Given 2L of IVF in ER with some improvement in renal function however she also received total of 3 mg of Bumex for management of pulmonary edema via CXR. MATTHEW now resolved. 2. Hyperkalemia 2/2 lack of clearance in the setting of decreased GFR   3. AMS 2/2 acute cystitis vs electrolyte abnormalities vs hypercapnia; resolved  4. Acute hypercapnic respiratory failure, improved in response to diuresis  5. UTI, on ceftriaxone  6. DM, on SSI and lantus, poorly controlled  7. HTN, on metoprolol and amlodipine  8. HFrEF 38%, proBNP 1420 >> 2348  9.  Nutrition: diet- renal, low K        Plan:    · Continue to hold lisinopril for now  · Continue low K diet  · Continue to monitor BMP  · Calculate TTKG      Electronically signed by AMINA Abreu CNP on 8/17/2020 at 4:18 PM

## 2020-08-17 NOTE — PROGRESS NOTES
[facility]  Internal Medicine Residency / [address]    Attending Physician Statement  I have discussed the case, including pertinent history and exam findings with the resident and the team.  I have seen and examined the patient and the key elements of the encounter have been performed by me. I agree with the assessment, plan and orders as documented by the resident. K is elevated at 5.1 and normal Cr Cl  S/P Diuresis   ACE on hold  Some chest pain today suggestive of CAD  EKG reviewed  Awaiting Troponin and negative  Update on Cardiac work up  ECHO etc   Labs reviewed  Remainder of medical problems as per resident note.       Magy Zhou  Internal Medicine Residency Faculty

## 2020-08-17 NOTE — PLAN OF CARE
Went and evaluated the patient because of chest pain. She describes the pain in the epigastric region, without radiation, no aggravating and relieving factors,  No acid reflux. She rates the pain as 6/10. The patient is resting comfortably on the bed. She fell asleep snf through the interview. When woken her up and asked how her pain was, she wanted nitroglycerin given because she mentions she gets these pain sometimes and takes nitroglycerin for it. Stat EKG was obtained. No major changed were seen when compared to the previous EKGs , no ST elevation/depression. One time stat troponin was ordered. We will continue to follow. The patient was sleeping comfortably without having to give nitroglycerin.

## 2020-08-17 NOTE — PROGRESS NOTES
[facility]  Internal Medicine Residency Program  Progress Note  - House Team 1    Patient:  Kevin Snow 62 y.o. female MRN: [id number]     Date of Service: 8/17/2020     CC: Hyperkalemia, MATTHEW  Overnight events: 5U lispo given, started on premeal 3U, increase lantus to 15U, Chronic CP- EKG unchange, troponin normal    Subjective   Pt was awake and oriented in no apparent distress. She no pain, SoB, n/v/c/d, fever, or chills on presentation this morning. Asking about chest pain last night, pt says that there was first severe aching, throbbing pain in her legs like she had felt before. She believe that her chest pain was 2/2 to the stress induced by her severe leg discomfort. Chest pain was unrelieved by nitro at that time. No aching in her legs has been described since that time. Pt also describe that over the past few years she has had more hair loss diffusely. It has come out in clumps sometimes when brushing her hair. Objective     Physical Exam:  · Vitals: /60   Pulse 79   Temp 97.1 °F (36.2 °C) (Infrared)   Resp 16   Ht 5' 2\" (1.575 m)   Wt 213 lb 8 oz (96.8 kg)   SpO2 91%   BMI 39.05 kg/m²     · I & O - 24hr: No intake/output data recorded. · General Appearance: alert, appears stated age and cooperative  · HEENT:  Head: Normocephalic, no lesions, without obvious abnormality. · Neck: no adenopathy, no carotid bruit, no JVD, supple, symmetrical, trachea midline and thyroid not enlarged, symmetric, no tenderness/mass/nodules  · Lung: clear to auscultation bilaterally  · Heart: regular rate and rhythm, S1, S2 normal, no murmur, click, rub or gallop  · Abdomen: soft, non-tender; bowel sounds normal; no masses,  no organomegaly  · Extremities:  extremities normal, atraumatic, no cyanosis or edema  · Musculokeletal: No joint swelling, no muscle tenderness. ROM normal in all joints of extremities.    · Neurologic: Mental status: Alert, oriented, thought content appropriate    Pertinent Labs & Imaging Studies     CBC:   Lab Results   Component Value Date    WBC 8.0 08/16/2020    RBC 4.07 08/16/2020    HGB 12.5 08/16/2020    HCT 41.1 08/16/2020    .0 08/16/2020    MCH 30.7 08/16/2020    MCHC 30.4 08/16/2020    RDW 13.9 08/16/2020     08/16/2020    MPV 9.4 08/16/2020     BMP:    Lab Results   Component Value Date     08/17/2020    K 5.1 08/17/2020    K 6.6 08/15/2020     08/17/2020    CO2 25 08/17/2020    BUN 22 08/17/2020    LABALBU 3.3 08/15/2020    LABALBU 4.3 04/16/2012    CREATININE 0.9 08/17/2020    CALCIUM 9.2 08/17/2020    GFRAA >60 08/17/2020    LABGLOM >60 08/17/2020    GLUCOSE 269 08/17/2020    GLUCOSE 264 04/17/2012     Troponin:    Lab Results   Component Value Date    TROPONINI <0.01 08/17/2020       Student's Assessment and Plan     Domenica Carroll is a 62 y.o. female who presented with weakness and AMS. Labs revealed hyperkalemia, hypermagnesemia, and MATTHEW. Chest pain last night prompted EKG and troponin which showed no changes from previous EKG and normal troponin.      Acute Hypercapnic hypoxic respiratory failure 2/2 BEATRICE  · PCo2-  · Albuterol 10mg once  · BiPAP  MATTHEW on CKD- resolving  · Bun- 22, Cr 0.9+ 8/17/2020  Hyperkalemia- resolving  · K-5.1 (8/17/2020)  Cystitis w/ Cholelithiasis  · U/A- Dark urine, positive nitrites, moderate leukocyte esterases, trace ketones, small bilirubin  · U/A micro- hyaline cast, WBC- 10-20, few bacteria, moderate leukocyte esterases  · No growth on urine culture   · Tx: Ceftriaxone IV 2/d  · US demonstrated cholelithiasis wo hydronephrosis  ·   HFpEF  · Amlodipine 2.5mg daily (HELD)  · Metoprolol 100mg BID (HELD)  · Lisinopril 5mg oral daily (HELD)  · Bumetanide IV 1mg (D/C 2mg dose)  · Pro-BNP- 2348  T2DM  · Glucose- 255 8/17/2020  · Insulin lispro 5U TID WC and 0-18 U 4x daily AC & HS  · Insulin glargine 40 U nightly  · Neuropathy- gabapentin 600mg 17g once  Hx Diarrhea  · Water diarrhea  · 3 episode/day over 3 days just prior to admission  PAD  · Right BKA  · Left AKA  · Clopidogrel 75mg daily  · Aspirin 81mg daily  MDD  · Sertraline 100mg daily  HLD  · Atorvastatin 40mg faily  DDx:  Hypothyroidism  · Hx of abnormal labs  · TSH 5.37 (12/14/2019), T4- 0.70 (12/13/2019)  · Hair loss over the past few years  · Possible relation to recent electrolyte abnormalities  · Not goiter was appreciated on physical exam    Plan:  · TSH level  · Follow electrolytes  · Ask about diet- determine if food contributing to recent electrolyte abnormalities   · COVID19 screen  · Continue DM tx- monitor glucose      PT/OT evaluation:  DVT prophylaxis/ GI prophylaxis:   Disposition: home +/- home health / [facility] / [facility] [address] / [facility]  MS3  Attending physician: Dr. Zoraida Bahena

## 2020-08-17 NOTE — PLAN OF CARE
Problem: Skin Integrity:  Goal: Will show no infection signs and symptoms  Description: Will show no infection signs and symptoms  8/17/2020 0055 by Ashley Sheikh RN  Outcome: Met This Shift  8/16/2020 2256 by Jasmeet Vallecillo RN  Outcome: Met This Shift  Goal: Absence of new skin breakdown  Description: Absence of new skin breakdown  Outcome: Met This Shift     Problem: Falls - Risk of:  Goal: Will remain free from falls  Description: Will remain free from falls  8/17/2020 0055 by Ashley Sheikh RN  Outcome: Met This Shift  8/16/2020 2256 by Jasmeet Vallecillo RN  Outcome: Met This Shift  Goal: Absence of physical injury  Description: Absence of physical injury  Outcome: Met This Shift     Problem: Pain:  Goal: Pain level will decrease  Description: Pain level will decrease  Outcome: Met This Shift  Goal: Control of acute pain  Description: Control of acute pain  Outcome: Met This Shift  Goal: Control of chronic pain  Description: Control of chronic pain  Outcome: Met This Shift

## 2020-08-17 NOTE — CARE COORDINATION
Social Work/Discharge Planning;  I met with pt in room. She is alert and oriented, very pleasant. Pt is a bilateral amputee. Pt reported that she resides with her  in a one floor plan home. She states there are a few steps to enter. Per pt, she's waiting for community agency to build her a ramp but she can not remember which one. Pt states her insurance CM was helping with this and she will have her  bring in contact information for her CM. Pt has a standard and an electric wheelchair at home and also a BSC and shower bench. Pt has a history of HHC through [facility]. Pt's plan is to return home with family when medically ready. Social Work will follow and assist with discharge planning. . Pt goes to [facility] and use [facility] on [address].    Maribel Layne [facility]  [phone]

## 2020-08-17 NOTE — PLAN OF CARE
Problem: Skin Integrity:  Goal: Will show no infection signs and symptoms  Description: Will show no infection signs and symptoms  Outcome: Met This Shift  Goal: Absence of new skin breakdown  Description: Absence of new skin breakdown  Outcome: Not Met This Shift     Problem: Falls - Risk of:  Goal: Will remain free from falls  Description: Will remain free from falls  Outcome: Met This Shift  Goal: Absence of physical injury  Description: Absence of physical injury  Outcome: Met This Shift     Problem: Pain:  Goal: Pain level will decrease  Description: Pain level will decrease  Outcome: Met This Shift  Goal: Control of acute pain  Description: Control of acute pain  Outcome: Met This Shift  Goal: Control of chronic pain  Description: Control of chronic pain  Outcome: Met This Shift

## 2020-08-17 NOTE — PROGRESS NOTES
[facility]  Internal Medicine Residency Program  Progress Note - House Team 1    Patient:  Al Rogers 62 y.o. female MRN: [id number]     Date of Service: 8/17/2020     CC: Lethargy  Overnight events: Patient had episode of leg pain and chest pain. The patient was given nitroglycerin. EKG was done with unchanged results. Troponins were negative. Subjective     Patient was seen and examined this AM. She says that her chest pain and leg pain have both subsided. She has no SOB, cough or orthopnea. She also denies any diarrhea. Objective     Physical Exam:  · Vitals: /60   Pulse 79   Temp 97.1 °F (36.2 °C) (Infrared)   Resp 16   Ht 5' 2\" (1.575 m)   Wt 213 lb 8 oz (96.8 kg)   SpO2 91%   BMI 39.05 kg/m²     · I & O - 24hr: No intake/output data recorded. · General Appearance: alert, appears stated age and cooperative  · HEENT:  Head: Normal, normocephalic, atraumatic. · Neck: no adenopathy, no carotid bruit, no JVD, supple, symmetrical, trachea midline and thyroid not enlarged, symmetric, no tenderness/mass/nodules  · Lung: clear to auscultation bilaterally  · Heart: regular rate and rhythm, S1, S2 normal, no murmur, click, rub or gallop  · Abdomen: soft, non-tender; bowel sounds normal; no masses,  no organomegaly  · Extremities:  extremities normal, atraumatic, no cyanosis or edema and BKA on right and AKA on left  · Musculokeletal: No joint swelling, no muscle tenderness. ROM normal in all joints of extremities.    · Neurologic: Mental status: Alert, oriented, thought content appropriate  Subject  Pertinent Labs & Imaging Studies   heather  CBC:   Lab Results   Component Value Date    WBC 8.0 08/16/2020    RBC 4.07 08/16/2020    HGB 12.5 08/16/2020    HCT 41.1 08/16/2020    .0 08/16/2020    MCH 30.7 08/16/2020    MCHC 30.4 08/16/2020    RDW 13.9 08/16/2020     08/16/2020    MPV 9.4 08/16/2020     CMP:    Lab Results   Component Value Date     08/17/2020 K 5.1 08/17/2020    K 6.6 08/15/2020     08/17/2020    CO2 25 08/17/2020    BUN 22 08/17/2020    CREATININE 0.9 08/17/2020    GFRAA >60 08/17/2020    LABGLOM >60 08/17/2020    GLUCOSE 269 08/17/2020    GLUCOSE 264 04/17/2012    PROT 5.6 08/15/2020    LABALBU 3.3 08/15/2020    LABALBU 4.3 04/16/2012    CALCIUM 9.2 08/17/2020    BILITOT 0.3 08/15/2020    ALKPHOS 60 08/15/2020    AST 12 08/15/2020    ALT 15 08/15/2020     Troponin:    Lab Results   Component Value Date    TROPONINI <0.01 08/17/2020       Resident's Assessment and Plan     Cara León is a 62 y.o. female    MATTHEW on CKD stage III   likely prerenal due to volume depletion   Creatinine 0.9 today from 1.7 on admission   Nephrology on board  Continue to monitor BMP      Hyperkalemia likely 2/2 to MATTHEW   K today 5.1 from 6.6   Previously receieved [facility]   Nephrology on Board   BMP q12   Keep on low K diet     Acute hypoxic hypercapnic respiratory failure likely 2/2 to BEATRICE  Supplemental oxygen   Currently not on any o2 support, tolerates room air   Monitor respiratory status closely     Acute encephalopathy likely 2/2 to metabolic disturbance, resolving  Responsive and cooperative   Continue to monitor mentation     Acute Cystitis   Follow Urine culture   On recephin     Type II DM, uncontrolled   Previously on 75 units ODHS and 18 units premeals   Insulin adjusted: Lantus 30 units and 8 units premeals  POCT glucose today 308   Monitor glucose, goal: 140-180      HFpEF   EF of 50-55% with stage II diastolic dysfunction (17/84/5641)   maintained on lisinopril, lopressor, bumex and amlodipine   pro BNP Today 2348   Monitor BNP    BEATRICE   On BIPAP, with poor compliance    Sleep study as out patient     Diarrhea, resolving   No episodes of diarrhea today   Follow stool cultures   Follow COVID-19 test     PAD s/p R BKA and L AKA   On norco and gabapentin for phantom pains     Hx of depression and Anxiety   Maintained on Zoloft   Continue Zoloft     Hx of CAD

## 2020-08-18 LAB
ANION GAP SERPL CALCULATED.3IONS-SCNC: 12 MMOL/L (ref 7–16)
BUN BLDV-MCNC: 20 MG/DL (ref 8–23)
CALCIUM SERPL-MCNC: 9.2 MG/DL (ref 8.6–10.2)
CHLORIDE BLD-SCNC: 103 MMOL/L (ref 98–107)
CHLORIDE URINE RANDOM: 70 MMOL/L
CO2: 26 MMOL/L (ref 22–29)
CREAT SERPL-MCNC: 0.9 MG/DL (ref 0.5–1)
CREATININE URINE: 102 MG/DL (ref 29–226)
GFR AFRICAN AMERICAN: >60
GFR NON-AFRICAN AMERICAN: >60 ML/MIN/1.73
GLUCOSE BLD-MCNC: 241 MG/DL (ref 74–99)
METER GLUCOSE: 229 MG/DL (ref 74–99)
METER GLUCOSE: 257 MG/DL (ref 74–99)
METER GLUCOSE: 279 MG/DL (ref 74–99)
METER GLUCOSE: 305 MG/DL (ref 74–99)
OSMOLALITY URINE: 543 MOSM/KG (ref 300–900)
OSMOLALITY: 302 MOSM/KG (ref 285–310)
POTASSIUM SERPL-SCNC: 5 MMOL/L (ref 3.5–5)
POTASSIUM, UR: 56 MMOL/L
PROTEIN PROTEIN: 15 MG/DL (ref 0–12)
PROTEIN/CREAT RATIO: 0.1
PROTEIN/CREAT RATIO: 0.1 (ref 0–0.2)
SODIUM BLD-SCNC: 141 MMOL/L (ref 132–146)
SODIUM URINE: 79 MMOL/L
TSH SERPL DL<=0.05 MIU/L-ACNC: 3.27 UIU/ML (ref 0.27–4.2)
URINE CULTURE, ROUTINE: NORMAL

## 2020-08-18 PROCEDURE — 2580000003 HC RX 258: Performed by: INTERNAL MEDICINE

## 2020-08-18 PROCEDURE — 84133 ASSAY OF URINE POTASSIUM: CPT

## 2020-08-18 PROCEDURE — 6360000002 HC RX W HCPCS: Performed by: INTERNAL MEDICINE

## 2020-08-18 PROCEDURE — 82088 ASSAY OF ALDOSTERONE: CPT

## 2020-08-18 PROCEDURE — 6370000000 HC RX 637 (ALT 250 FOR IP): Performed by: INTERNAL MEDICINE

## 2020-08-18 PROCEDURE — 84244 ASSAY OF RENIN: CPT

## 2020-08-18 PROCEDURE — 82962 GLUCOSE BLOOD TEST: CPT

## 2020-08-18 PROCEDURE — 82436 ASSAY OF URINE CHLORIDE: CPT

## 2020-08-18 PROCEDURE — 80048 BASIC METABOLIC PNL TOTAL CA: CPT

## 2020-08-18 PROCEDURE — 84443 ASSAY THYROID STIM HORMONE: CPT

## 2020-08-18 PROCEDURE — 83935 ASSAY OF URINE OSMOLALITY: CPT

## 2020-08-18 PROCEDURE — 94660 CPAP INITIATION&MGMT: CPT

## 2020-08-18 PROCEDURE — 84156 ASSAY OF PROTEIN URINE: CPT

## 2020-08-18 PROCEDURE — 36415 COLL VENOUS BLD VENIPUNCTURE: CPT

## 2020-08-18 PROCEDURE — 84300 ASSAY OF URINE SODIUM: CPT

## 2020-08-18 PROCEDURE — 82570 ASSAY OF URINE CREATININE: CPT

## 2020-08-18 PROCEDURE — 2140000000 HC CCU INTERMEDIATE R&B

## 2020-08-18 PROCEDURE — 83930 ASSAY OF BLOOD OSMOLALITY: CPT

## 2020-08-18 RX ORDER — INSULIN GLARGINE 100 [IU]/ML
35 INJECTION, SOLUTION SUBCUTANEOUS NIGHTLY
Status: DISCONTINUED | OUTPATIENT
Start: 2020-08-18 | End: 2020-08-19

## 2020-08-18 RX ADMIN — CLOPIDOGREL 75 MG: 75 TABLET, FILM COATED ORAL at 09:11

## 2020-08-18 RX ADMIN — Medication 10 ML: at 09:13

## 2020-08-18 RX ADMIN — ASPIRIN 81 MG: 81 TABLET, COATED ORAL at 09:11

## 2020-08-18 RX ADMIN — ATORVASTATIN CALCIUM 40 MG: 40 TABLET, FILM COATED ORAL at 09:11

## 2020-08-18 RX ADMIN — INSULIN LISPRO 9 UNITS: 100 INJECTION, SOLUTION INTRAVENOUS; SUBCUTANEOUS at 06:14

## 2020-08-18 RX ADMIN — METOPROLOL TARTRATE 100 MG: 50 TABLET, FILM COATED ORAL at 09:12

## 2020-08-18 RX ADMIN — HYDROCODONE BITARTRATE AND ACETAMINOPHEN 1 TABLET: 5; 325 TABLET ORAL at 06:14

## 2020-08-18 RX ADMIN — Medication 10 ML: at 20:16

## 2020-08-18 RX ADMIN — PANTOPRAZOLE SODIUM 40 MG: 40 TABLET, DELAYED RELEASE ORAL at 06:14

## 2020-08-18 RX ADMIN — INSULIN LISPRO 8 UNITS: 100 INJECTION, SOLUTION INTRAVENOUS; SUBCUTANEOUS at 06:14

## 2020-08-18 RX ADMIN — HYDROCODONE BITARTRATE AND ACETAMINOPHEN 1 TABLET: 5; 325 TABLET ORAL at 16:50

## 2020-08-18 RX ADMIN — INSULIN GLARGINE 35 UNITS: 100 INJECTION, SOLUTION SUBCUTANEOUS at 20:17

## 2020-08-18 RX ADMIN — GABAPENTIN 600 MG: 300 CAPSULE ORAL at 13:25

## 2020-08-18 RX ADMIN — FOLIC ACID 1 MG: 1 TABLET ORAL at 09:11

## 2020-08-18 RX ADMIN — RANOLAZINE 1000 MG: 500 TABLET, FILM COATED, EXTENDED RELEASE ORAL at 22:50

## 2020-08-18 RX ADMIN — METOPROLOL TARTRATE 100 MG: 50 TABLET, FILM COATED ORAL at 20:16

## 2020-08-18 RX ADMIN — INSULIN LISPRO 12 UNITS: 100 INJECTION, SOLUTION INTRAVENOUS; SUBCUTANEOUS at 11:42

## 2020-08-18 RX ADMIN — SERTRALINE HYDROCHLORIDE 100 MG: 100 TABLET ORAL at 09:11

## 2020-08-18 RX ADMIN — INSULIN LISPRO 8 UNITS: 100 INJECTION, SOLUTION INTRAVENOUS; SUBCUTANEOUS at 16:40

## 2020-08-18 RX ADMIN — RANOLAZINE 1000 MG: 500 TABLET, FILM COATED, EXTENDED RELEASE ORAL at 09:11

## 2020-08-18 RX ADMIN — ENOXAPARIN SODIUM 40 MG: 40 INJECTION SUBCUTANEOUS at 09:12

## 2020-08-18 RX ADMIN — GABAPENTIN 600 MG: 300 CAPSULE ORAL at 20:15

## 2020-08-18 RX ADMIN — INSULIN LISPRO 8 UNITS: 100 INJECTION, SOLUTION INTRAVENOUS; SUBCUTANEOUS at 11:43

## 2020-08-18 RX ADMIN — INSULIN LISPRO 6 UNITS: 100 INJECTION, SOLUTION INTRAVENOUS; SUBCUTANEOUS at 20:16

## 2020-08-18 RX ADMIN — GABAPENTIN 600 MG: 300 CAPSULE ORAL at 09:11

## 2020-08-18 RX ADMIN — CEFTRIAXONE SODIUM 1 G: 1 INJECTION, POWDER, FOR SOLUTION INTRAMUSCULAR; INTRAVENOUS at 09:12

## 2020-08-18 RX ADMIN — INSULIN LISPRO 9 UNITS: 100 INJECTION, SOLUTION INTRAVENOUS; SUBCUTANEOUS at 16:40

## 2020-08-18 ASSESSMENT — PAIN SCALES - GENERAL
PAINLEVEL_OUTOF10: 0
PAINLEVEL_OUTOF10: 6
PAINLEVEL_OUTOF10: 0
PAINLEVEL_OUTOF10: 0
PAINLEVEL_OUTOF10: 8

## 2020-08-18 NOTE — PROGRESS NOTES
Date: 8/17/2020    Time: 10:15 PM    Patient Placed On BIPAP/CPAP/ Non-Invasive Ventilation? Yes    If no must comment. Facial area red/color change? No           If YES are Blister/Lesion present? No   If yes must notify nursing staff  BIPAP/CPAP skin barrier?   Yes    Skin barrier type:duoderm       Comments:        Liu Sung

## 2020-08-18 NOTE — PROGRESS NOTES
microspheres, albuterol, HYDROcodone-acetaminophen    DATA:    CBC with Differential:    Lab Results   Component Value Date    WBC 8.0 08/16/2020    RBC 4.07 08/16/2020    HGB 12.5 08/16/2020    HCT 41.1 08/16/2020     08/16/2020    .0 08/16/2020    MCH 30.7 08/16/2020    MCHC 30.4 08/16/2020    RDW 13.9 08/16/2020    NRBC 1 09/21/2012    SEGSPCT 50 03/21/2014    LYMPHOPCT 12.6 08/16/2020    MONOPCT 4.7 08/16/2020    BASOPCT 0.5 08/16/2020    MONOSABS 0.38 08/16/2020    LYMPHSABS 1.01 08/16/2020    EOSABS 0.33 08/16/2020    BASOSABS 0.04 08/16/2020     CMP:    Lab Results   Component Value Date     08/18/2020    K 5.0 08/18/2020    K 6.6 08/15/2020     08/18/2020    CO2 26 08/18/2020    BUN 20 08/18/2020    CREATININE 0.9 08/18/2020    GFRAA >60 08/18/2020    LABGLOM >60 08/18/2020    GLUCOSE 241 08/18/2020    GLUCOSE 264 04/17/2012    PROT 5.6 08/15/2020    LABALBU 3.3 08/15/2020    LABALBU 4.3 04/16/2012    CALCIUM 9.2 08/18/2020    BILITOT 0.3 08/15/2020    ALKPHOS 60 08/15/2020    AST 12 08/15/2020    ALT 15 08/15/2020     Magnesium:    Lab Results   Component Value Date    MG 1.6 08/16/2020     Phosphorus:    Lab Results   Component Value Date    PHOS 4.2 12/18/2019     Urine Studies:   Chloride: 70   Creatinine: 80   Potassium: 38.3   Sodium 97   Urea Nitrogen: 688        Radiology Review:          Chest xray 8/15/20   Diffuse groundglass opacities scattered throughout the    lungs bilaterally in the setting of cardiomegaly, likely representing    pulmonary edema. CT head wo contrast 8/15/20   Diffuse atrophy likely age related    Findings compatible with small vessel ischemic changes.             CT chest wo contrast 8/15/20   No suspicious pulmonary nodules or masses.         Trace right pleural effusion.           US retroperitoneal 8/16/2020         1. Unremarkable kidneys without stone or hydronephrosis. 2. Bladder not visualized due to decompression by Coelho catheter. 3. Cholelithiasis. BRIEF SUMMARY OF INITIAL CONSULT:    Briefly Mrs. Radha Little is a 55-year-old female with a PMH of HTN, type II DM, hyperlipidemia, CAD, HFrEF 38%, PVD status post right BKA and left AKA, neuropathy, CAD, heart failure with reduced ejection fraction, anemia who presented to the ED on 8/15/2020 with complaints of weakness and fatigue x 3 days. She also reported that she had watery diarrhea x 3 days and dark urine at home. Labs obtained in the ED were remarkable for potassium of 6.2, BUN 46, creatinine 1.7, ProBNP 1420, hemoglobin 12.3, UA + for trace ketones, nitrites and leukocytes. She was treated for hyperkalemia, given 2L normal saline and ceftriaxone prior to admission. We are consulted for MATTHEW. Pertinent Home medications include: Bumex, Lisinopril, Lopressor, Norvac, Imdur, metformin. IMPRESSION/RECOMMENDATIONS:      1. MATTHEW stage I, etiology unclear, volume responsive prerenal MATTHEW due to diarrhea and decreased PO intake in the setting of diuretics and ACE inhibition versus hemodynamically mediated prerenal MATTHEW in the setting of decompensated heart failure (cardiorenal syndrome). Given 2L of IVF in ER with some improvement in renal function however she also received total of 3 mg of Bumex for management of pulmonary edema via CXR. MATTHEW now resolved. 2. Hyperkalemia 2/2 lack of clearance in the setting of decreased GFR, transtubular potassium gradient 6  3. AMS 2/2 acute cystitis vs electrolyte abnormalities vs hypercapnia; resolved  4. Acute hypercapnic respiratory failure, improved in response to diuresis  5. UTI, on ceftriaxone  6. DM, on SSI and lantus, poorly controlled  7. HTN, on metoprolol and amlodipine  8. HFrEF 38%, proBNP 1420 >> 2348  9.  Nutrition: diet- renal, low K        Plan:    · Continue to hold lisinopril for now  · Continue low K diet  · Continue to monitor BMP  · Check urine studies results from this morning         Electronically signed by Hossein Alcantara APRN - CNP on 8/18/2020 at 10:48 AM

## 2020-08-18 NOTE — PROGRESS NOTES
[address] [facility]  Internal Medicine Residency / [address]    Attending Physician Statement  I have discussed the case, including pertinent history and exam findings with the resident and the team.  I have seen and examined the patient and the key elements of the encounter have been performed by me. I agree with the assessment, plan and orders as documented by the resident. A&O  On low K diet  K is 5.0  Chest pain resolved  Cr 0.9  VS stable on BP meds  On Rocephin for UTI   No Urologic issues  Cardiac EF 50 to 55%  Plan: Review meds     Remainder of medical problems as per resident note.       Eliza Hatch  Internal Medicine Residency Faculty

## 2020-08-18 NOTE — PROGRESS NOTES
[facility]  Internal Medicine Residency Program  Progress Note - House Team 1    Patient:  Nate Puente 62 y.o. female MRN: [id number]     Date of Service: 8/18/2020     CC: Lethargy  Overnight events: no events overnight     Subjective     Patient was seen and examined this AM. She has no re-occurence of chest pain and notes that her leg pain had improved. She has mild back pain on her right lumbar area which is mostly positional. She was advised turning and more ambulation. She has no SOB, cough or orthopnea. She also denies any diarrhea, nausea, vomiting and has good appetite. Objective     Physical Exam:  · Vitals: BP (!) 113/58   Pulse 78   Temp 99.2 °F (37.3 °C) (Oral)   Resp 18   Ht 5' 2\" (1.575 m)   Wt 216 lb 3.2 oz (98.1 kg)   SpO2 94%   BMI 39.54 kg/m²     I & O - 24hr: I/O this shift:  In: 10 [I.V.:10]  · Out: 550 [Urine:550]   · General Appearance: alert, appears stated age and cooperative  · HEENT:  Head: Normal, normocephalic, atraumatic. · Neck: no adenopathy, no carotid bruit, no JVD, supple, symmetrical, trachea midline and thyroid not enlarged, symmetric, no tenderness/mass/nodules  · Lung: clear to auscultation bilaterally  · Heart: regular rate and rhythm, S1, S2 normal, no murmur, click, rub or gallop  · Abdomen: soft, non-tender; bowel sounds normal; no masses,  no organomegaly  · Extremities:  extremities normal, atraumatic, no cyanosis or edema and BKA on right and AKA on left  · Musculokeletal: No joint swelling, no muscle tenderness. ROM normal in all joints of extremities.    · Neurologic: Mental status: Alert, oriented, thought content appropriate  Subject  Pertinent Labs & Imaging Studies   heather  CBC:   Lab Results   Component Value Date    WBC 8.0 08/16/2020    RBC 4.07 08/16/2020    HGB 12.5 08/16/2020    HCT 41.1 08/16/2020    .0 08/16/2020    MCH 30.7 08/16/2020    MCHC 30.4 08/16/2020    RDW 13.9 08/16/2020     08/16/2020    MPV 9.4 08/16/2020     CMP:    Lab Results   Component Value Date     08/18/2020    K 5.0 08/18/2020    K 6.6 08/15/2020     08/18/2020    CO2 26 08/18/2020    BUN 20 08/18/2020    CREATININE 0.9 08/18/2020    GFRAA >60 08/18/2020    LABGLOM >60 08/18/2020    GLUCOSE 241 08/18/2020    GLUCOSE 264 04/17/2012    PROT 5.6 08/15/2020    LABALBU 3.3 08/15/2020    LABALBU 4.3 04/16/2012    CALCIUM 9.2 08/18/2020    BILITOT 0.3 08/15/2020    ALKPHOS 60 08/15/2020    AST 12 08/15/2020    ALT 15 08/15/2020     Troponin:    Lab Results   Component Value Date    TROPONINI <0.01 08/17/2020       Resident's Assessment and Plan     Veda Go is a 62 y.o. female with an extensive past medical history including CAD, CHF (EF 55%), Insulin Dependent DM, uncontrolled, CKD stage III, hyperlipideamia, HTN, s/p BKA on the right and AKA on the left. She presented with fatigue and weakness after 3 days of diarrhea. On workup she was found to have MATTHEW on her CKD, hyperkalemia, Acute hypoxic hypercapnic respiratory failure which has since resolved. She also complained of frequency and dysuria and was worked-up for cystitis. She was started on rocephin. 1. MATTHEW, resolved on CKD stage III   - Likely prerenal due to volume depletion   - Creatinine 0.9 today from 1.7 on admission   - Nephrology on board  - Continue to monitor BMP      2. Hyperkalemia likely 2/2 to MATTHEW   - K today 5.0 from 6.6 on admission   - Previously receieved [facility]   - Nephrology on Board   - BMP q12   - Keep on low K diet     3. Acute hypoxic hypercapnic respiratory failure likely 2/2 to BEATRICE  - She was placed on Supplemental oxygen on admission   - Currently not on any o2 support, tolerates room air   - Monitor respiratory status closely   - BIPAP ODHS     4. Acute encephalopathy likely 2/2 to metabolic disturbance, resolved  - Responsive and cooperative   - Continue to monitor mentation     5.  Acute Cystitis   - Patient initially  - Follow Urine culture   - On Rocephin     6. Type II DM, uncontrolled   - Maintained at home on 75 units ODHS and 18 units premeals   - Insulin adjusted today from :Lantus 30 units and 8 units premeals with HDSS to Lantus 35 units and 8 units premeals with HDSS   - POCT glucose today 250s  - Monitor glucose, goal: 140-180      7. HFpEF   - EF of 50-55% with stage II diastolic dysfunction (28/59/5253)   - Maintained at home on lisinopril, lopressor, bumex and amlodipine   - Pro BNP Today 2348   - Monitor BNP    8. Hypertension  -  Maintained at home on Amlodipine and Lopressor   -  Resume Amlodipine and Lopressor     9. BEATRICE   - On BIPAP at home, with poor compliance    - BIPAP ODHS   - Sleep study as out patient     10. Diarrhea, resolved  - No episodes of diarrhea today   - Follow stool cultures   - COVID-19 test negative   - Patient will be transferred back to non-covid unit     11. PAD s/p R BKA and L AKA   - Has less pain today   - On norco and gabapentin for phantom pains     12. Hx of depression and Anxiety   - Maintained at home on Zoloft   - Continue Zoloft     13. Hx of CAD   - Maintained at home on aspirin, Imdur, Plavix, amlodipine, Nitroglycerin, Ranolazine and Lipitor  - Resume Lipitor, Lopressor, Amlodipine, Nitroglycerin, Ranolazine and Plavix     14.  Hx of Hypothyroidism  - TSH: 12/1029 5.3   - Patient noted loosing hair over past months   - TSH 08/18/2020 3.270     DVT prophylaxis/ GI prophylaxis: Lovenox / Protonix   Disposition: continue current care, for discharge planning     Michael Dubose MD, PGY-1  Attending physician: Dr. Flores Holland

## 2020-08-18 NOTE — PROGRESS NOTES
joint swelling, no muscle tenderness. ROM normal in all joints of extremities. · Neurologic: Mental status: Alert, oriented, thought content appropriate    Pertinent Labs & Imaging Studies     BMP:    Lab Results   Component Value Date     08/18/2020    K 5.0 08/18/2020    K 6.6 08/15/2020     08/18/2020    CO2 26 08/18/2020    BUN 20 08/18/2020    LABALBU 3.3 08/15/2020    LABALBU 4.3 04/16/2012    CREATININE 0.9 08/18/2020    CALCIUM 9.2 08/18/2020    GFRAA >60 08/18/2020    LABGLOM >60 08/18/2020    GLUCOSE 241 08/18/2020    GLUCOSE 264 04/17/2012     Student's Assessment and Plan     Tish Collado is a 62 y.o. female stable overnight with no notable event. Pt was temporarily moved to COVID unit while test was pending. Pt will return to a non-COVID unit as results were negative. Acute Hypercapnic hypoxic respiratory failure 2/2 BEATRICE  ? PCo2-  ? Albuterol 10mg once  ? BiPAP  MATTHEW on CKD- resolving  ? Bun- 22, Cr 0.9+ 8/17/2020  Hyperkalemia- resolving  ? K-5.1 (8/17/2020)  Cystitis w/ Cholelithiasis  ? U/A- Dark urine, positive nitrites, moderate leukocyte esterases, trace ketones, small bilirubin  ? U/A micro- hyaline cast, WBC- 10-20, few bacteria, moderate leukocyte esterases  ? No growth on urine culture   ? Tx: Ceftriaxone IV 2/d  ? US demonstrated cholelithiasis wo hydronephrosis  ?    HFpEF  ? Amlodipine 2.5mg daily (HELD)  ? Metoprolol 100mg BID (HELD)  ? Lisinopril 5mg oral daily (HELD)  ? Bumetanide IV 1mg (D/C 2mg dose)  ? Pro-BNP- 2348  T2DM  ? Glucose- 257 8/18/2020  ? Insulin lispro 5U TID WC and 0-18 U 4x daily AC & HS  ? Insulin glargine 40 U nightly  ? Neuropathy- gabapentin 600mg 17g once  ? adjust pre-meal lantus as glucose still >200  Hx Diarrhea  ? Water diarrhea  ? 3 episode/day over 3 days just prior to admission  PAD  ? Right BKA  ? Left AKA  ? Clopidogrel 75mg daily  ? Aspirin 81mg daily  MDD  ? Sertraline 100mg daily  HLD  ?  Atorvastatin 40mg

## 2020-08-18 NOTE — PLAN OF CARE
Problem: Skin Integrity:  Goal: Will show no infection signs and symptoms  Description: Will show no infection signs and symptoms  8/18/2020 0705 by Jody Beavers RN  Outcome: Met This Shift  8/17/2020 1840 by Mark Earl RN  Outcome: Met This Shift  Goal: Absence of new skin breakdown  Description: Absence of new skin breakdown  8/18/2020 0705 by Jody Beavers RN  Outcome: Met This Shift  8/17/2020 1840 by Mark Earl RN  Outcome: Not Met This Shift     Problem: Falls - Risk of:  Goal: Will remain free from falls  Description: Will remain free from falls  8/18/2020 0705 by Jody Beavers RN  Outcome: Met This Shift  8/17/2020 1840 by Mark Earl RN  Outcome: Met This Shift  Goal: Absence of physical injury  Description: Absence of physical injury  8/18/2020 0705 by Jody Beavers RN  Outcome: Met This Shift  8/17/2020 1840 by Mark Earl RN  Outcome: Met This Shift     Problem: Pain:  Goal: Pain level will decrease  Description: Pain level will decrease  8/18/2020 0705 by Jody Beavers RN  Outcome: Met This Shift  8/17/2020 1840 by Mark Earl RN  Outcome: Met This Shift  Goal: Control of acute pain  Description: Control of acute pain  8/18/2020 0705 by Jody Beavers RN  Outcome: Met This Shift  8/17/2020 1840 by Mark Earl RN  Outcome: Met This Shift  Goal: Control of chronic pain  Description: Control of chronic pain  8/18/2020 0705 by Jody Beavers RN  Outcome: Met This Shift  8/17/2020 1840 by Mark Earl RN  Outcome: Met This Shift

## 2020-08-18 NOTE — CARE COORDINATION
Care Coordination: Pt was a rule out and moved to covid unit, is not in any isolation. Pt has no positive testing hx and now returned covid negative. Unit is working to move pt off unit.   Infection control is aware    Darnell Mejía

## 2020-08-19 VITALS
WEIGHT: 216.9 LBS | HEIGHT: 62 IN | TEMPERATURE: 96.8 F | SYSTOLIC BLOOD PRESSURE: 127 MMHG | HEART RATE: 74 BPM | RESPIRATION RATE: 18 BRPM | DIASTOLIC BLOOD PRESSURE: 58 MMHG | OXYGEN SATURATION: 93 % | BODY MASS INDEX: 39.91 KG/M2

## 2020-08-19 LAB
ANION GAP SERPL CALCULATED.3IONS-SCNC: 14 MMOL/L (ref 7–16)
BUN BLDV-MCNC: 29 MG/DL (ref 8–23)
CALCIUM SERPL-MCNC: 8.6 MG/DL (ref 8.6–10.2)
CHLORIDE BLD-SCNC: 101 MMOL/L (ref 98–107)
CO2: 22 MMOL/L (ref 22–29)
CREAT SERPL-MCNC: 1.1 MG/DL (ref 0.5–1)
GFR AFRICAN AMERICAN: >60
GFR NON-AFRICAN AMERICAN: 50 ML/MIN/1.73
GLUCOSE BLD-MCNC: 292 MG/DL (ref 74–99)
METER GLUCOSE: 253 MG/DL (ref 74–99)
METER GLUCOSE: 266 MG/DL (ref 74–99)
METER GLUCOSE: 293 MG/DL (ref 74–99)
POTASSIUM SERPL-SCNC: 4.9 MMOL/L (ref 3.5–5)
SODIUM BLD-SCNC: 137 MMOL/L (ref 132–146)

## 2020-08-19 PROCEDURE — 6370000000 HC RX 637 (ALT 250 FOR IP): Performed by: INTERNAL MEDICINE

## 2020-08-19 PROCEDURE — 2580000003 HC RX 258: Performed by: INTERNAL MEDICINE

## 2020-08-19 PROCEDURE — 94660 CPAP INITIATION&MGMT: CPT

## 2020-08-19 PROCEDURE — 6370000000 HC RX 637 (ALT 250 FOR IP): Performed by: NURSE PRACTITIONER

## 2020-08-19 PROCEDURE — 36415 COLL VENOUS BLD VENIPUNCTURE: CPT

## 2020-08-19 PROCEDURE — 82962 GLUCOSE BLOOD TEST: CPT

## 2020-08-19 PROCEDURE — 6360000002 HC RX W HCPCS: Performed by: INTERNAL MEDICINE

## 2020-08-19 PROCEDURE — 80048 BASIC METABOLIC PNL TOTAL CA: CPT

## 2020-08-19 RX ORDER — BUMETANIDE 1 MG/1
1 TABLET ORAL 2 TIMES DAILY
Status: DISCONTINUED | OUTPATIENT
Start: 2020-08-19 | End: 2020-08-19 | Stop reason: HOSPADM

## 2020-08-19 RX ORDER — INSULIN GLARGINE 100 [IU]/ML
40 INJECTION, SOLUTION SUBCUTANEOUS NIGHTLY
Status: DISCONTINUED | OUTPATIENT
Start: 2020-08-19 | End: 2020-08-19 | Stop reason: HOSPADM

## 2020-08-19 RX ORDER — SODIUM CHLORIDE 9 MG/ML
INJECTION, SOLUTION INTRAVENOUS CONTINUOUS
Status: ACTIVE | OUTPATIENT
Start: 2020-08-19 | End: 2020-08-19

## 2020-08-19 RX ADMIN — HYDROCODONE BITARTRATE AND ACETAMINOPHEN 1 TABLET: 5; 325 TABLET ORAL at 00:53

## 2020-08-19 RX ADMIN — Medication 10 ML: at 10:42

## 2020-08-19 RX ADMIN — PANTOPRAZOLE SODIUM 40 MG: 40 TABLET, DELAYED RELEASE ORAL at 05:52

## 2020-08-19 RX ADMIN — RANOLAZINE 1000 MG: 500 TABLET, FILM COATED, EXTENDED RELEASE ORAL at 10:20

## 2020-08-19 RX ADMIN — INSULIN LISPRO 9 UNITS: 100 INJECTION, SOLUTION INTRAVENOUS; SUBCUTANEOUS at 16:19

## 2020-08-19 RX ADMIN — ASPIRIN 81 MG: 81 TABLET, COATED ORAL at 10:19

## 2020-08-19 RX ADMIN — INSULIN LISPRO 9 UNITS: 100 INJECTION, SOLUTION INTRAVENOUS; SUBCUTANEOUS at 07:00

## 2020-08-19 RX ADMIN — INSULIN LISPRO 10 UNITS: 100 INJECTION, SOLUTION INTRAVENOUS; SUBCUTANEOUS at 10:28

## 2020-08-19 RX ADMIN — ATORVASTATIN CALCIUM 40 MG: 40 TABLET, FILM COATED ORAL at 10:22

## 2020-08-19 RX ADMIN — INSULIN LISPRO 9 UNITS: 100 INJECTION, SOLUTION INTRAVENOUS; SUBCUTANEOUS at 10:29

## 2020-08-19 RX ADMIN — FOLIC ACID 1 MG: 1 TABLET ORAL at 10:20

## 2020-08-19 RX ADMIN — HYDROCODONE BITARTRATE AND ACETAMINOPHEN 1 TABLET: 5; 325 TABLET ORAL at 11:43

## 2020-08-19 RX ADMIN — GABAPENTIN 600 MG: 300 CAPSULE ORAL at 10:20

## 2020-08-19 RX ADMIN — INSULIN LISPRO 10 UNITS: 100 INJECTION, SOLUTION INTRAVENOUS; SUBCUTANEOUS at 16:15

## 2020-08-19 RX ADMIN — BUMETANIDE 1 MG: 1 TABLET ORAL at 16:11

## 2020-08-19 RX ADMIN — SODIUM CHLORIDE: 9 INJECTION, SOLUTION INTRAVENOUS at 10:19

## 2020-08-19 RX ADMIN — CLOPIDOGREL 75 MG: 75 TABLET, FILM COATED ORAL at 10:20

## 2020-08-19 RX ADMIN — GABAPENTIN 600 MG: 300 CAPSULE ORAL at 16:12

## 2020-08-19 RX ADMIN — METOPROLOL TARTRATE 100 MG: 50 TABLET, FILM COATED ORAL at 10:20

## 2020-08-19 RX ADMIN — SERTRALINE HYDROCHLORIDE 100 MG: 100 TABLET ORAL at 10:20

## 2020-08-19 RX ADMIN — ENOXAPARIN SODIUM 40 MG: 40 INJECTION SUBCUTANEOUS at 10:23

## 2020-08-19 ASSESSMENT — PAIN SCALES - GENERAL
PAINLEVEL_OUTOF10: 7
PAINLEVEL_OUTOF10: 2
PAINLEVEL_OUTOF10: 9
PAINLEVEL_OUTOF10: 0

## 2020-08-19 NOTE — CARE COORDINATION
SOCIAL WORK/DISCHARGE PLANNING;  Order for discharge in chart. Spoke with pt and she is aware of discharge. Pt denied any needs at this time and declined [facility]. Family will transport home.    Alysha So [facility] [place]  [phone]

## 2020-08-19 NOTE — PROGRESS NOTES
Department of Internal Medicine  Nephrology Nurse Practitioner Consult Note    Events reviewed. SUBJECTIVE : We are following Mrs. Marroquin for MATTHEW. She reports no complaints.      PHYSICAL EXAM:      Vitals:    VITALS:  BP (!) 127/58   Pulse 74   Temp 96.8 °F (36 °C) (Infrared)   Resp 18   Ht 5' 2\" (1.575 m)   Wt 216 lb 14.4 oz (98.4 kg)   SpO2 93%   BMI 39.67 kg/m²   24HR INTAKE/OUTPUT:      Intake/Output Summary (Last 24 hours) at 8/19/2020 1344  Last data filed at 8/19/2020 1219  Gross per 24 hour   Intake 1360 ml   Output 1075 ml   Net 285 ml       Constitutional: Alert and oriented, in no distress   HEENT: PERRL, normocephalic  Respiratory: CTA bilaterally  Cardiovascular/Edema: RRR, S1/S2  Gastrointestinal: Abdomen soft, nontender, nondistended,  contreras cateter patent and draining  Neurologic: Nonfocal  Skin: No lesions  Other: + sacral and BLE ema , right BKA and left AKA    Scheduled Meds:   insulin glargine  40 Units Subcutaneous Nightly    insulin lispro  10 Units Subcutaneous TID WC    ranolazine  1,000 mg Oral BID    sodium chloride flush  10 mL Intravenous 2 times per day    enoxaparin  40 mg Subcutaneous Daily    [Held by provider] amLODIPine  2.5 mg Oral Daily    aspirin  81 mg Oral Daily    atorvastatin  40 mg Oral Daily    clopidogrel  75 mg Oral Daily    folic acid  1 mg Oral Daily    metoprolol  100 mg Oral BID    pantoprazole  40 mg Oral QAM AC    sertraline  100 mg Oral Daily    polyethylene glycol  17 g Oral Once    gabapentin  600 mg Oral TID    insulin lispro  0-18 Units Subcutaneous 4x Daily AC & HS    albuterol  10 mg Nebulization Once     Continuous Infusions:   sodium chloride 75 mL/hr at 08/19/20 1019    dextrose       PRN Meds:.sodium chloride flush, acetaminophen **OR** acetaminophen, ondansetron, docusate sodium, glucose, dextrose, glucagon (rDNA), dextrose, perflutren lipid microspheres, albuterol, HYDROcodone-acetaminophen    DATA:    CBC with Differential:    Lab Results   Component Value Date    WBC 8.0 08/16/2020    RBC 4.07 08/16/2020    HGB 12.5 08/16/2020    HCT 41.1 08/16/2020     08/16/2020    .0 08/16/2020    MCH 30.7 08/16/2020    MCHC 30.4 08/16/2020    RDW 13.9 08/16/2020    NRBC 1 09/21/2012    SEGSPCT 50 03/21/2014    LYMPHOPCT 12.6 08/16/2020    MONOPCT 4.7 08/16/2020    BASOPCT 0.5 08/16/2020    MONOSABS 0.38 08/16/2020    LYMPHSABS 1.01 08/16/2020    EOSABS 0.33 08/16/2020    BASOSABS 0.04 08/16/2020     CMP:    Lab Results   Component Value Date     08/19/2020    K 4.9 08/19/2020    K 6.6 08/15/2020     08/19/2020    CO2 22 08/19/2020    BUN 29 08/19/2020    CREATININE 1.1 08/19/2020    GFRAA >60 08/19/2020    LABGLOM 50 08/19/2020    GLUCOSE 292 08/19/2020    GLUCOSE 264 04/17/2012    PROT 5.6 08/15/2020    LABALBU 3.3 08/15/2020    LABALBU 4.3 04/16/2012    CALCIUM 8.6 08/19/2020    BILITOT 0.3 08/15/2020    ALKPHOS 60 08/15/2020    AST 12 08/15/2020    ALT 15 08/15/2020     Magnesium:    Lab Results   Component Value Date    MG 1.6 08/16/2020     Phosphorus:    Lab Results   Component Value Date    PHOS 4.2 12/18/2019     Urine Studies:   Chloride: 70   Creatinine: 80   Potassium: 38.3   Sodium 97   Urea Nitrogen: 688        Radiology Review:          Chest xray 8/15/20   Diffuse groundglass opacities scattered throughout the    lungs bilaterally in the setting of cardiomegaly, likely representing    pulmonary edema. CT head wo contrast 8/15/20   Diffuse atrophy likely age related    Findings compatible with small vessel ischemic changes.             CT chest wo contrast 8/15/20   No suspicious pulmonary nodules or masses.         Trace right pleural effusion.           US retroperitoneal 8/16/2020         1. Unremarkable kidneys without stone or hydronephrosis. 2. Bladder not visualized due to decompression by Coelho catheter. 3. Cholelithiasis.        BRIEF SUMMARY OF INITIAL CONSULT:    Briefly Mrs. Tammy Herbert is a 68-year-old female with a PMH of HTN, type II DM, hyperlipidemia, CAD, HFrEF 38%, PVD status post right BKA and left AKA, neuropathy, CAD, heart failure with reduced ejection fraction, anemia who presented to the ED on 8/15/2020 with complaints of weakness and fatigue x 3 days. She also reported that she had watery diarrhea x 3 days and dark urine at home. Labs obtained in the ED were remarkable for potassium of 6.2, BUN 46, creatinine 1.7, ProBNP 1420, hemoglobin 12.3, UA + for trace ketones, nitrites and leukocytes. She was treated for hyperkalemia, given 2L normal saline and ceftriaxone prior to admission. We are consulted for MATTHEW. Pertinent Home medications include: Bumex, Lisinopril, Lopressor, Norvac, Imdur, metformin. Problems resolved:  · Hyperkalemia 2/2 lack of clearance in the setting of decreased GFR, transtubular potassium gradient 6     IMPRESSION/RECOMMENDATIONS:      1. MATTHEW stage I, with and initial component of volume responsiveness prerenal MATTHEW (diarrhea and decreased PO intake in the setting of diuretics and ACE inhibition) and complicated with volume overload after fluid resucitation. Given 2L of IVF in ER with some improvement in renal function however she also received total of 3 mg of Bumex for management of pulmonary edema via CXR. · MATTHEW resolved, now with recurrent rise in creatinine to 1.1 mg/dL and worsening BLE edema likely cardiorenal etiology. Will start Bumex PO.    2. HFrEF 38%, proBNP 1420 >> 2348  3. AMS 2/2 acute cystitis vs electrolyte abnormalities vs hypercapnia; resolved  4. Acute hypercapnic respiratory failure, improved in response to diuresis  5. UTI, received ceftriaxone  6. DM, on SSI and lantus, poorly controlled  7. HTN, on metoprolol and amlodipine  8.  Nutrition: diet- renal, low K        Plan:    · Start Bumex 1 mg PO bid  · Continue to hold lisinopril for now  · Continue low K diet  · Continue to monitor BMP      Electronically signed by Mike Rodriguez AMINA Leblanc - CNP on 8/19/2020 at 1:44 PM

## 2020-08-19 NOTE — PROGRESS NOTES
Date: 8/18/2020    Time: 10:18 PM    Patient Placed On BIPAP/CPAP/ Non-Invasive Ventilation? No    If no must comment. Comments:    Pt refused to go on bipap for the night. bipap is in room with regular circuit.      Juhi Chun

## 2020-08-19 NOTE — PROGRESS NOTES
has no insurance or financial hardship      [] Script for glucometer and supplies (per preference of patient's insurance)               [] Script for outpatient diabetes education classes from PCP    [x] Carbohydrate-controlled diet      Thank you for this consult.     Pradip Arriaza RN, BSN, Doni Page

## 2020-08-19 NOTE — PROGRESS NOTES
[facility]  Internal Medicine Residency Program  Progress Note - House Team 1    Patient:  Rona Pereira 62 y.o. female MRN: [id number]     Date of Service: 8/19/2020     CC: Lethargy  Overnight events: no events overnight     Subjective     Patient was seen and examined this AM. Patient feels much better this morning. She does not feel any dysuria, or suprapubic tenderness. No Chest pain or SOB. When in inquired regarding her living conditions, she says she lives with her  and daughter and is able to ambulate at home independently with an electric wheelchair. She has a few steps in her home. Objective     Physical Exam:  · Vitals: /60   Pulse 77   Temp 98.2 °F (36.8 °C) (Temporal)   Resp 20   Ht 5' 2\" (1.575 m)   Wt 216 lb 14.4 oz (98.4 kg)   SpO2 96%   BMI 39.67 kg/m²     · I & O - 24hr: No intake/output data recorded. · General Appearance: alert, appears stated age and cooperative  · HEENT:  Head: Normal, normocephalic, atraumatic. · Neck: no adenopathy, no carotid bruit, no JVD, supple, symmetrical, trachea midline and thyroid not enlarged, symmetric, no tenderness/mass/nodules  · Lung: clear to auscultation bilaterally  · Heart: regular rate and rhythm, S1, S2 normal, no murmur, click, rub or gallop  · Abdomen: soft, non-tender; bowel sounds normal; no masses,  no organomegaly  · Extremities:  extremities normal, atraumatic, no cyanosis or edema and BKA on right and AKA on left  · Musculokeletal: No joint swelling, no muscle tenderness. ROM normal in all joints of extremities.    · Neurologic: Mental status: Alert, oriented, thought content appropriate  Subject  Pertinent Labs & Imaging Studies   heather  CBC:   Lab Results   Component Value Date    WBC 8.0 08/16/2020    RBC 4.07 08/16/2020    HGB 12.5 08/16/2020    HCT 41.1 08/16/2020    .0 08/16/2020    MCH 30.7 08/16/2020    MCHC 30.4 08/16/2020    RDW 13.9 08/16/2020     08/16/2020    MPV 9.4 08/16/2020     CMP:    Lab Results   Component Value Date     08/18/2020    K 5.0 08/18/2020    K 6.6 08/15/2020     08/18/2020    CO2 26 08/18/2020    BUN 20 08/18/2020    CREATININE 0.9 08/18/2020    GFRAA >60 08/18/2020    LABGLOM >60 08/18/2020    GLUCOSE 241 08/18/2020    GLUCOSE 264 04/17/2012    PROT 5.6 08/15/2020    LABALBU 3.3 08/15/2020    LABALBU 4.3 04/16/2012    CALCIUM 9.2 08/18/2020    BILITOT 0.3 08/15/2020    ALKPHOS 60 08/15/2020    AST 12 08/15/2020    ALT 15 08/15/2020     Troponin:    Lab Results   Component Value Date    TROPONINI <0.01 08/17/2020       Resident's Assessment and Plan     Christiano Marquez is a 62 y.o. female with an extensive past medical history including CAD, CHF (EF 55%), Insulin Dependent DM, uncontrolled, CKD stage III, hyperlipideamia, HTN, s/p BKA on the right and AKA on the left. She presented with fatigue and weakness after 3 days of diarrhea. On workup she was found to have MATTHEW on her CKD, hyperkalemia, Acute hypoxic hypercapnic respiratory failure which has since resolved. She also complained of frequency and dysuria and was worked-up for cystitis. She was started on rocephin. 1. MATTHEW, resolved on CKD stage III   - Likely prerenal due to volume depletion   - Creatinine elevated to 1.1 today   - I&O: -3 L net during this admission  BUN/Crea Ratio: 26   - Normal saline infusion 75 cc/hr   - Nephrology on board  - Continue to monitor BMP      2. Hyperkalemia likely 2/2 to MATTHEW   - K  5.0 from 6.6 on admission   - Previously receieved [facility]   - Nephrology on Board   - BMP q12   - Keep on low K diet     3. Acute hypoxic hypercapnic respiratory failure likely 2/2 to BEATRICE  - She was placed on Supplemental oxygen on admission   - Currently not on any o2 support, tolerates room air   - Monitor respiratory status closely   - BIPAP ODHS     4.  Acute encephalopathy likely 2/2 to metabolic disturbance, resolved  - Responsive and cooperative   - Continue to monitor

## 2020-08-19 NOTE — PROGRESS NOTES
[facility]  Internal Medicine Residency Program  Progress Note  - House Team 1    Patient:  Monserrat Lawson 62 y.o. female MRN: [id number]     Date of Service: 8/19/2020     CC: Hyperkalemia, MATTHEW   Overnight events: glucose >200. Pt refused BiPAP overnight    Subjective   Pt was alert and oriented. She has no complains overnight or today. Pt describe a headache yesterday that lasted until the evening described as a pressure in her head. No chest pain or leg aching has been noted since the isolated event three days ago. Pt describe urine as normal flow but more concentrated. Pt is still interested in Diabetes Education and is also hoping for D/C today. Pt was told that she may have an CRX by \"someone\" but I did not see any notes suggesting this. Objective     Physical Exam:  · Vitals: /60   Pulse 77   Temp 98.2 °F (36.8 °C) (Temporal)   Resp 20   Ht 5' 2\" (1.575 m)   Wt 216 lb 14.4 oz (98.4 kg)   SpO2 96%   BMI 39.67 kg/m²     · I & O - 24hr: No intake/output data recorded. · General Appearance: alert, appears stated age and cooperative  · HEENT:  Head: Normocephalic, no lesions, without obvious abnormality. · Neck: no adenopathy, no carotid bruit, no JVD, supple, symmetrical, trachea midline and thyroid not enlarged, symmetric, no tenderness/mass/nodules  · Lung: clear to auscultation bilaterally  · Heart: regular rate and rhythm, S1, S2 normal, no murmur, click, rub or gallop  · Abdomen: soft, non-tender; bowel sounds normal; no masses,  no organomegaly some digestive sounds behind sternum at level of Rib 5  · Extremities:  extremities normal, atraumatic, no cyanosis or edema  · Musculokeletal: No joint swelling, no muscle tenderness. ROM normal in all joints of extremities.    · Neurologic: Mental status: Alert, oriented, thought content appropriate    Pertinent Labs & Imaging Studies     BMP:    Lab Results   Component Value Date     08/19/2020    K 4.9 08/19/2020 K 6.6 08/15/2020     08/19/2020    CO2 22 08/19/2020    BUN 29 08/19/2020    LABALBU 3.3 08/15/2020    LABALBU 4.3 04/16/2012    CREATININE 1.1 08/19/2020    CALCIUM 8.6 08/19/2020    GFRAA >60 08/19/2020    LABGLOM 50 08/19/2020    GLUCOSE 292 08/19/2020    GLUCOSE 264 04/17/2012       Student's Assessment and Plan     Miriam Benoit is a 62 y.o. female has returned to the non-COVID unit. She is looking well and was cleared by nephrology yesterday for D/C per pt's report. Acute Hypercapnic hypoxic respiratory failure 2/2 BEATRICE  ? Albuterol 10mg once  ? BiPAP refused overnight  MATTHEW on CKD- Increases  ? Bun- 29, Cr 1.1 8/19/2020  ? Was D/C on 01/02/2020 with BUN-33, Cr-1.2  ? 75mL IVF given   Hyperkalemia- RESOLVED  ? K-4.9 (8/19/2020)  Cystitis w/ Cholelithiasis  ? U/A- Dark urine, positive nitrites, moderate leukocyte esterases, trace ketones, small bilirubin  ? U/A micro- hyaline cast, WBC- 10-20, few bacteria, moderate leukocyte esterases  ? No growth on urine culture   ? Tx: Ceftriaxone IV 2/d  ? US demonstrated cholelithiasis wo hydronephrosis  HFpEF  ? Amlodipine 2.5mg daily (HELD)  ? Metoprolol 100mg BID (HELD)  ? Lisinopril 5mg oral daily (HELD)  ? Bumetanide IV 1mg (D/C 2mg dose)  ? Pro-BNP- 2348  T2DM  ? Glucose- 257 8/18/2020  ? Insulin lispro 5U TID WC and 0-18 U 4x daily AC & HS  ? Insulin glargine 40 U nightly  ? Neuropathy- gabapentin 600mg 17g once  ? adjust pre-meal lantus as glucose still >200  Hx Diarrhea  ? Water diarrhea  ? 3 episode/day over 3 days just prior to admission  PAD  ? Right BKA  ? Left AKA  ? Clopidogrel 75mg daily  ? Aspirin 81mg daily  MDD  ? Sertraline 100mg daily  HLD  ?  Atorvastatin 40mg faily    Plan:  · Consider D/C after fluid administration and repeat labs  · Consult Diabetes Education  · Continue DM tx- monitor glucose    PT/OT evaluation:  DVT prophylaxis/ GI prophylaxis:   Disposition: home +/- home health / Yoselyn Cheng / Yasmin Fowler / [address] [place] [address]  MS3  Attending

## 2020-08-20 NOTE — DISCHARGE SUMMARY
pathology, other tests):   CBC:   Lab Results   Component Value Date    WBC 8.0 2020    RBC 4.07 2020    HGB 12.5 2020    HCT 41.1 2020    .0 2020    MCH 30.7 2020    MCHC 30.4 2020    RDW 13.9 2020     2020    MPV 9.4 2020     BMP:    Lab Results   Component Value Date     2020    K 4.9 2020    K 6.6 08/15/2020     2020    CO2 22 2020    BUN 29 2020    LABALBU 3.3 08/15/2020    LABALBU 4.3 2012    CREATININE 1.1 2020    CALCIUM 8.6 2020    GFRAA >60 2020    LABGLOM 50 2020    GLUCOSE 292 2020    GLUCOSE 264 2012        Ct Head Wo Contrast    Result Date: 8/15/2020  Patient MRN:  [id number] : 1960 Age: 62 years Gender: Female Order Date:  8/15/2020 5:02 PM EXAM: CT HEAD WO CONTRAST NUMBER OF IMAGES:  140 INDICATION:  dizziness dizziness COMPARISON: CT scan of the head 2019 Technique: Low-dose CT  acquisition technique included one of following options; 1 . Automated exposure control, 2. Adjustment of MA and or KV according to patient's size or 3. Use of iterative reconstruction. Multiple CT sections were obtained with sagittal and coronal MPR reconstructions. The ventricles are prominent. The gyri and sulci appear  prominent. The white matter appears  prominent. There is no evidence for hemorrhage. There is no infarct identified. There is no mass effect identified. There is no mass identified. Diffuse atrophy likely age related Findings compatible with small vessel ischemic changes. Ct Chest Wo Contrast    Result Date: 8/15/2020  Patient Name:  Carolina Gaucher Patient MRN:  [id number] Patient :  1960 Patient Age:  62 years Patient Gender:  Female Order Date:8/15/2020 8:14 PM EXAM:  CT CHEST WO CONTRAST NUMBER OF IMAGES:  306 INDICATION:   cough cough COMPARISON: CT 2019.  Technique: Multiple axial images were obtained from the apices of the lungs through the lung bases. Sagittal and coronal reconstructions performed for aid in interpretation of the study. FINDINGS: Lungs: No suspicious pulmonary nodules or masses. Airway: Unremarkable. Heart/Vasculature: Moderate coronary artery vascular calcifications. Mediastinum: Unchanged mild prominence of several mediastinal lymph nodes. Pleura: Trace right pleural effusion. Osseous structures/soft tissue: Unchanged sclerotic lesion within the T11 vertebral body, likely degenerative. Upper abdomen: Cholelithiasis. Other: None. No suspicious pulmonary nodules or masses. Trace right pleural effusion. Xr Chest Portable    Result Date: 8/15/2020  Patient MRN: [id number] : 1960 Age:  62 years Gender: Female Order Date: 8/15/2020 2:40 PM Exam: XR CHEST PORTABLE Number of Images: 1 view Indication:   chest pain chest pain Comparison: XR CHEST PORTABLE 2020 Findings: Stable cardiomegaly. Diffuse groundglass opacities scattered throughout the lungs bilaterally. There are atherosclerotic calcifications of aorta. Diffuse groundglass opacities scattered throughout the lungs bilaterally in the setting of cardiomegaly, likely representing pulmonary edema. Us Retroperitoneal Complete    Result Date: 2020  Patient MRN:  [id number] : 1960 Age: 62 years Gender: Female Order Date:  2020 3:06 PM EXAM: US RETROPERITONEAL COMPLETE INDICATION:  MATTHEW MATTHEW COMPARISON: CT the abdomen and pelvis, 2020 FINDINGS: The kidneys are normal in size, contour and echogenicity. The right kidney measures 12.6 x 4.5 x 4.6 cm the left kidney measures 13.1 x 4.8 x 5.8 cm. No stone or hydronephrosis is seen. The urinary bladder is nonvisualized due to decompression by Coelho catheter. Incidental note is made of cholelithiasis. 1. Unremarkable kidneys without stone or hydronephrosis. 2. Bladder not visualized due to decompression by Coelho catheter. 3. Cholelithiasis.        Pending test results: None    Consults: None    Procedures: None    Condition at discharge: Improved, stable    Disposition: home    Discharge Medications:  Discharge Medication List as of 8/19/2020  3:50 PM      CONTINUE these medications which have NOT CHANGED    Details   gabapentin (NEURONTIN) 600 MG tablet Take 1 tablet by mouth 3 times daily for 90 days. , Disp-90 tablet,R-2Normal      HYDROcodone-acetaminophen (NORCO) 5-325 MG per tablet Take 1 tablet by mouth every 8 hours as needed for Pain for up to 30 days. , Disp-90 tablet,R-0Print      bumetanide (BUMEX) 1 MG tablet take 1 tablet by mouth once daily, Disp-30 tablet,R-1Normal      lisinopril (PRINIVIL;ZESTRIL) 5 MG tablet Take 1 tablet by mouth daily, Disp-30 tablet,R-0Normal      insulin glargine (LANTUS SOLOSTAR) 100 UNIT/ML injection pen Inject 75 Units into the skin nightly, Disp-5 pen,R-3Adjust Sig      aspirin (ASPIRIN LOW DOSE) 81 MG EC tablet Take 1 tablet by mouth daily, Disp-30 tablet, R-3Normal      isosorbide mononitrate (IMDUR) 60 MG extended release tablet Take one tablet 60 mg daily, Disp-30 tablet, R-3Normal      blood glucose test strips (ONE TOUCH ULTRA TEST) strip Disp-100 strip, R-3, Normaluse TEST STRIP four times a day      atorvastatin (LIPITOR) 40 MG tablet Take 1 tablet by mouth daily, Disp-30 tablet, R-3Normal      docusate sodium (COLACE) 100 MG capsule Take 1 capsule by mouth daily as needed for Constipation, Disp-30 capsule, R-3Normal      insulin lispro (HUMALOG) 100 UNIT/ML injection vial Please take 18U three times daily with meals (before meals), Disp-3 vial, R-3Normal      nitroGLYCERIN (NITROSTAT) 0.4 MG SL tablet Place 1 tablet under the tongue every 5 minutes as needed for Chest pain, Disp-25 tablet, R-0Normal      sertraline (ZOLOFT) 100 MG tablet Take 1 tablet by mouth daily, Disp-30 tablet, R-3Normal      metoprolol (LOPRESSOR) 100 MG tablet Take 1 tablet by mouth 2 times daily, Disp-60 tablet, R-3Normal      clopidogrel (PLAVIX) 75 MG tablet Take 1 tablet by mouth daily, Disp-30 tablet, R-3Normal      amLODIPine (NORVASC) 2.5 MG tablet Take 1 tablet by mouth daily, Disp-30 tablet, R-3Normal      ranolazine (RANEXA) 1000 MG extended release tablet Take 1 tablet by mouth 2 times daily, Disp-60 tablet, R-3Normal      pantoprazole (PROTONIX) 40 MG tablet Take 1 tablet by mouth every morning (before breakfast), Disp-30 tablet, X-[id number]      folic acid (FOLVITE) 1 MG tablet Take 1 tablet by mouth daily, Disp-30 tablet, R-3Normal      metFORMIN (GLUCOPHAGE) 500 MG tablet Take 1 tablet by mouth 2 times daily (with meals), Disp-180 tablet, R-1Normal      Insulin Syringe-Needle U-100 (BD INSULIN SYRINGE U/F) 31G X 5/16\" 1 ML MISC Disp-100 each, R-2, Normaluse to inject INSULIN UNDER THE SKIN four times a day      OXYGEN Inhale 2.5 L into the lungs continuous As neededHistorical Med      Blood Glucose Monitoring Suppl (BLOOD GLUCOSE MONITOR SYSTEM) w/Device KIT 4 TIMES DAILY Starting Sun 1/12/2020, Disp-1 kit, R-0, NormalOne Touch Meter          STOP taking these medications       influenza virus trivalent vaccine (FLUZONE) injection Comments:   Reason for Stopping:               Activity: activity as tolerated  Diet: diabetic diet    Follow-up appointments: Follow up with the Internal medicine clinic on August 28 2020, 9:30 AM.     Please make an appointment with Dr. Xochitl Chavez for follow up     Patient Instructions:     Please be compliant with your medications.      To do:    Please have CMP done prior to the hospital follow-up       Bairon Goodman M.D., PGY - 1   3:37 PM  8/20/2020    Attending physician: Dr. Gerardo Dense

## 2020-08-21 LAB — ALDOSTERONE: 3.9 NG/DL

## 2020-08-22 LAB — RENIN ACTIVITY: 1.4 NG/ML/HR

## 2020-08-24 ENCOUNTER — OFFICE VISIT (OUTPATIENT)
Dept: CARDIOLOGY CLINIC | Age: 60
End: 2020-08-24
Payer: MEDICAID

## 2020-08-24 VITALS
HEART RATE: 71 BPM | SYSTOLIC BLOOD PRESSURE: 110 MMHG | BODY MASS INDEX: 41.23 KG/M2 | DIASTOLIC BLOOD PRESSURE: 56 MMHG | WEIGHT: 210 LBS | HEIGHT: 60 IN | RESPIRATION RATE: 18 BRPM

## 2020-08-24 PROBLEM — I50.22 CHRONIC SYSTOLIC CONGESTIVE HEART FAILURE (HCC): Status: ACTIVE | Noted: 2020-08-24

## 2020-08-24 PROBLEM — I20.89 STABLE ANGINA: Status: ACTIVE | Noted: 2020-08-24

## 2020-08-24 PROBLEM — I20.8 STABLE ANGINA (HCC): Status: ACTIVE | Noted: 2020-08-24

## 2020-08-24 PROCEDURE — 99215 OFFICE O/P EST HI 40 MIN: CPT | Performed by: INTERNAL MEDICINE

## 2020-08-24 PROCEDURE — 93000 ELECTROCARDIOGRAM COMPLETE: CPT | Performed by: INTERNAL MEDICINE

## 2020-08-24 RX ORDER — LISINOPRIL 5 MG/1
5 TABLET ORAL 2 TIMES DAILY
Qty: 180 TABLET | Refills: 1 | Status: SHIPPED
Start: 2020-08-24 | End: 2020-10-12

## 2020-08-24 NOTE — PROGRESS NOTES
[address] [facility]   Heart and Vascular [place]   Clinic Note     Date:8/24/20   Patient Alejandra Kolb  YOB: 1960  Age: 62 y.o. Primary Care Provider: Alexa Antonio MD    Subjective     This is a pleasant 78-year-old  female who presents for follow-up (was seen by Dr. Ginette Koyanagi in the hospital). She is accompanied by her . She was last seen by Cassaundra Buerger in January in the heart failure clinic. Her cardiac history goes back to December 2019 when she had a non-STEMI with a peak troponin of 0.16. She underwent coronary angiography which revealed mid LAD and mid RCA  and significant stenosis in the OM. She was declined CABG here. She was sent to [facility] clinic where a viability PET revealed viable myocardium throughout. She was deemed not a candidate for  PCI due to the diffuse nature of her CAD. I was not able to locate any documentation of whether she is a surgical candidate or not. Since then she has had multiple noncardiac hospitalizations. Her last episode of chest pain was about a month ago, she says when her sugars were in the 500, she took 1 nitroglycerin and the chest pain resolved. Prior to that her episodes of chest pain were related mostly to emotional stress and occasionally food ingestion. She has no orthopnea or PND. She has no palpitations or syncope or presyncope. She is wheelchair-bound as noted below     A focused history review includes:  1. Chronic diastolic heart failure  1. Most recent TTE in December 2019 at [facility]: Moderately dilated LV with mildly reduced systolic function and LVEF 45 to 50%, normal RV size and systolic function. Technically very challenging exam  2. CAD:  1. Three-vessel disease (December 2019 at [facility]: Mid LAD and mid RCA  with collaterals and high-grade OM stenosis)  2. FDG PET at [facility] in December 2019: All myocardium is viable  3.  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 Type 2 diabetes  5. Hypertension  6. CKD with baseline creatinine 1.1  7. Dyslipidemia  8.  BMI 41    Past History    Past Medical History:         Diagnosis Date    MATTHEW (acute kidney injury) ([address]     Anemia     Aorto-iliac atherosclerosis ([address] 3/15/2016    Atherosclerosis of native arteries of left leg with ulceration of other part of foot ([address] 5/10/2017    Atherosclerosis of native arteries of the extremities with gangrene ([address] 6/20/2017    Atherosclerosis of native arteries of the extremities with ulceration(440.23) 2/21/2014    Atherosclerosis of native artery of left lower extremity with ulceration ([address] 2/26/2017    Blood transfusion reaction     CAD (coronary artery disease)     Charcot's joint of foot due to diabetes ([address] 2/21/2014    CHF (congestive heart failure) (HCC)     Chronic osteomyelitis, ankle and foot     Depression     Diabetic neuropathy (HCC)     Diabetic ulcer of left foot associated with type 1 diabetes mellitus ([address] 2/26/2017    Femoral-popliteal atherosclerosis ([address] 3/15/2016    Foot ulceration ([address] 10/28/2014    History of blood transfusion     HTN (hypertension)     Hyperlipidemia     Ischemic cardiomyopathy     EF 40%    Mitral regurgitation     Non-pressure chronic ulcer of left lower leg with fat layer exposed ([address] 6/29/2016    PVD (peripheral vascular disease) ([address]     Systolic heart failure ([address] 10/20/13    10/20/13-echocardiogram revealed an LVEF of 35%    Type II or unspecified type diabetes mellitus with other specified manifestations, not stated as uncontrolled     Ulcer of leg due to secondary diabetes ([address] 3/15/2016    Ulcer of other part of foot     Unspecified diseases of blood and blood-forming organs           Social History:    Social History     Tobacco History     Smoking Status  Former Smoker Quit date  10/4/2002 Smoking Frequency  1 pack/day for 20 years (20 pk yrs) Smoking Tobacco mononitrate (IMDUR) 60 MG extended release tablet Take one tablet 60 mg daily 30 tablet 3    atorvastatin (LIPITOR) 40 MG tablet Take 1 tablet by mouth daily 30 tablet 3    docusate sodium (COLACE) 100 MG capsule Take 1 capsule by mouth daily as needed for Constipation 30 capsule 3    insulin lispro (HUMALOG) 100 UNIT/ML injection vial Please take 18U three times daily with meals (before meals) 3 vial 3    nitroGLYCERIN (NITROSTAT) 0.4 MG SL tablet Place 1 tablet under the tongue every 5 minutes as needed for Chest pain 25 tablet 0    sertraline (ZOLOFT) 100 MG tablet Take 1 tablet by mouth daily 30 tablet 3    metoprolol (LOPRESSOR) 100 MG tablet Take 1 tablet by mouth 2 times daily 60 tablet 3    clopidogrel (PLAVIX) 75 MG tablet Take 1 tablet by mouth daily 30 tablet 3    amLODIPine (NORVASC) 2.5 MG tablet Take 1 tablet by mouth daily 30 tablet 3    ranolazine (RANEXA) 1000 MG extended release tablet Take 1 tablet by mouth 2 times daily 60 tablet 3    pantoprazole (PROTONIX) 40 MG tablet Take 1 tablet by mouth every morning (before breakfast) 30 tablet 3    folic acid (FOLVITE) 1 MG tablet Take 1 tablet by mouth daily 30 tablet 3    metFORMIN (GLUCOPHAGE) 500 MG tablet Take 1 tablet by mouth 2 times daily (with meals) 180 tablet 1    OXYGEN Inhale 2.5 L into the lungs continuous As needed      blood glucose test strips (ONE TOUCH ULTRA TEST) strip use TEST STRIP four times a day 100 strip 3    Insulin Syringe-Needle U-100 (BD INSULIN SYRINGE U/F) 31G X 5/16\" 1 ML MISC use to inject INSULIN UNDER THE SKIN four times a day 100 each 2    Blood Glucose Monitoring Suppl (BLOOD GLUCOSE MONITOR SYSTEM) w/Device KIT 1 each by Other route 4 times daily One Touch Meter 1 kit 0     No current facility-administered medications for this visit.             Physical Examination      BP (!) 110/56   Pulse 71   Resp 18   Ht 5' (1.524 m)   Wt 210 lb (95.3 kg)   BMI 41.01 kg/m²     General: No acute distress, appears as stated age, nonicteric  Head: Atraumatic, no gross abnormalities or bruises  Neck: Supple and nontender, no carotid bruits, no JVP  Lungs: Clear to auscultation bilaterally, no wheezes, rales, or rhonchi  Heart: Regular rate and rhythm, no murmurs, rubs, or gallops  Abdomen: Soft, nontender, nondistended, normal bowel sounds  Extremities: Status post right BKA and left AKA. No pitting edema in either stump  Neurological: Alert and oriented x3, EOMI, moving all extremities x4  Psychological: Normal mood and affect, cooperative  Skin: Color, texture, and turgor normal for age          Labs/Imaging/Diagnostics     Lab Results   Component Value Date     08/19/2020    K 4.9 08/19/2020    K 6.6 08/15/2020     08/19/2020    CO2 22 08/19/2020    BUN 29 08/19/2020    CREATININE 1.1 08/19/2020    GLUCOSE 292 08/19/2020    GLUCOSE 264 04/17/2012    CALCIUM 8.6 08/19/2020        Estimated Creatinine Clearance: 55 mL/min (A) (based on SCr of 1.1 mg/dL (H)).     Lab Results   Component Value Date    WBC 8.0 08/16/2020    HGB 12.5 08/16/2020    HCT 41.1 08/16/2020    .0 (H) 08/16/2020     08/16/2020       Lab Results   Component Value Date    ALT 15 08/15/2020    AST 12 08/15/2020    ALKPHOS 60 08/15/2020    BILITOT 0.3 08/15/2020       Lab Results   Component Value Date    LABPROT 0.1 08/18/2020    LABPROT 0.1 08/18/2020    LABALBU 3.3 (L) 08/15/2020       Lab Results   Component Value Date    CHOL 186 12/02/2019    CHOL 407 (H) 09/17/2019    CHOL 375 (H) 08/24/2018     Lab Results   Component Value Date    TRIG 538 (H) 12/02/2019    TRIG 616 (H) 09/17/2019    TRIG 688 (H) 08/24/2018     Lab Results   Component Value Date    HDL 22 12/02/2019    HDL 35 09/17/2019    HDL 33 08/24/2018     Lab Results   Component Value Date    LDLCALC - (AA) 12/02/2019    LDLCALC - (AA) 09/17/2019    LDLCALC - (AA) 08/24/2018     Lab Results   Component Value Date    LABVLDL - (GINGER) 12/02/2019    LABVLDL - (GINGER) 09/17/2019    LABVLDL - (AA) 08/24/2018     No results found for: [facility]    Lab Results   Component Value Date    CKTOTAL 67 08/15/2020    CKMB 2.7 08/15/2020    TROPONINI <0.01 08/17/2020       Lab Results   Component Value Date     (H) 11/11/2013         Lab Results   Component Value Date    LABA1C 7.7 (H) 08/16/2020     No results found for: EAG     Pertinent Cardiovascular Studies:  EKG: Personally reviewed today  Normal sinus rhythm with age-indeterminate inferior infarct. There is an IVCD      Assessment and Plan: This is a pleasant 17-year-old  female with history of non-STEMI and three-vessel CAD (mid LAD and mid RCA  and high-grade OM stenosis as of December 2019) with totally viable myocardium, heart failure with borderline ejection fraction (LVEF 45 to 50% as of December 2019), PAD status post bilateral major amputation, type 2 diabetes, CKD, hypertension. Her CAD was deemed not amenable to  PCI due to the diffuse nature. There was no formal opinion on her candidacy for CABG given her viable myocardium. She has stable angina CCS class II, most recently as of a month ago, mostly related to emotional stress. Her heart failure appears well compensated and she has no symptoms or signs of arrhythmia. Diagnosis Orders   1. Chronic systolic congestive heart failure (HCC)  EKG 12 Lead   2. Essential hypertension  lisinopril (PRINIVIL;ZESTRIL) 5 MG tablet   3. 3-vessel coronary artery disease     4. Stable angina ([address]     5. PAD (peripheral artery disease) ([facility]        · Given her poor mobility and difficulty in transportation, we will try to reach out to [facility] and verify whether she was evaluated by CT surgery therefore CABG or not. If she was not then we will refer her there again since she was declined CABG here.   · In the meantime we will continue her current cardiac medications including dual antiplatelet therapy, amlodipine and Imdur and metoprolol

## 2020-08-24 NOTE — Clinical Note
I saw the papers that you dropped off and that was helpful. What I still can see is whether they thought she is a candidate for CABG or not. I do not know how this works but whenever you have time (low priority) can you call up there and find out if she was seen by CT surgery at all and if not is there a way that we can do a remote consult to them without her having to physically go there? If I have to call somebody, please let me know.   Thank you

## 2020-08-27 ENCOUNTER — TELEPHONE (OUTPATIENT)
Dept: CARDIOLOGY CLINIC | Age: 60
End: 2020-08-27

## 2020-08-27 NOTE — TELEPHONE ENCOUNTER
I called [facility] and verified that was the notes contain is all that was done. There was no CABG workup done. Called [facility] again to discuss them working pt up for CABG. Nurse currently with patients and will call me back.

## 2020-08-27 NOTE — TELEPHONE ENCOUNTER
----- Message from Aldo Andersen RN sent at 8/25/2020  8:26 PM EDT -----  Regarding: f/u to Tish Collado [facility]. If you have time Oksana, can you investigate into if Tish Collado 1960 is candidate for CABG. Was seen at [facility] (this is the one that [facility] said it was to bad disease and was to f/u with Bridgette Kebede locally. But DR Rosa Simpson wondering if anyone worked up for CABG while at [facility]. Thank you  Aldo Andersen RN 8/25/2020  8:28 PM   ----- Message -----  From: Aida Marie MD  Sent: 8/24/2020   4:21 PM EDT  To: Aldo Andersen RN    I saw the papers that you dropped off and that was helpful. What I still can see is whether they thought she is a candidate for CABG or not. I do not know how this works but whenever you have time (low priority) can you call up there and find out if she was seen by CT surgery at all and if not is there a way that we can do a remote consult to them without her having to physically go there? If I have to call somebody, please let me know.   Thank you

## 2020-08-31 ENCOUNTER — TELEPHONE (OUTPATIENT)
Dept: INTERNAL MEDICINE | Age: 60
End: 2020-08-31

## 2020-08-31 NOTE — TELEPHONE ENCOUNTER
[facility]    Pre-visit planning call to discuss patient care during COVID-19 pandemic. A. Left message about scheduled Face to Face appointment about virtual visits through [employer] or [employer]. me link to facilitate patient care continuity. B. Result of call: Patient was not able to answer the phone, voice mail box full, not ablt to left message     C. COVID-19 screening: Left message instructed to call office if viral symptoms develop prior to Face to Face appointment. Advised patient if coming for Face to Face office visit to enter by the Internal Medicine entrance on [facility]. for screening of viral symptoms, temperature check and masking.     Ele Ragland MD  8/31/20

## 2020-09-17 ENCOUNTER — TELEPHONE (OUTPATIENT)
Dept: CARDIOLOGY CLINIC | Age: 60
End: 2020-09-17

## 2020-09-21 ENCOUNTER — TELEPHONE (OUTPATIENT)
Dept: CARDIOLOGY CLINIC | Age: 60
End: 2020-09-21

## 2020-09-21 NOTE — TELEPHONE ENCOUNTER
Called pt to reschedule her appointment with sidney Pagan on October 26 and was told to call her back later this afternoon.

## 2020-09-28 ENCOUNTER — TELEPHONE (OUTPATIENT)
Dept: INTERNAL MEDICINE | Age: 60
End: 2020-09-28

## 2020-09-28 RX ORDER — CLOPIDOGREL BISULFATE 75 MG/1
75 TABLET ORAL DAILY
Qty: 90 TABLET | Refills: 0 | Status: SHIPPED | OUTPATIENT
Start: 2020-09-28

## 2020-09-28 NOTE — TELEPHONE ENCOUNTER
Called Maureen Lerner to move her oct 12 visit with DR Alvarado Citizen   will not be in office this day    Pending call back. Manuel Carl can reschedule her.

## 2020-09-30 NOTE — PROGRESS NOTES
Maile Haji          Telephone Encounter    Addendum    Encounter Date:  8/27/2020                     I called [facility] and verified that was the notes contain is all that was done. There was no CABG workup done.

## 2020-09-30 NOTE — PROGRESS NOTES
Referral to [facility] placed,   Spoke with DR Solo Banegas office  Send referral to them  They will manage CABG work up vs PCI needs. Dr will review chart and decide if VV is applicable.     Roberto Vallejo RN 9/30/2020 3:46 PM

## 2020-10-01 NOTE — TELEPHONE ENCOUNTER
Recalled Trinity Ana Maria [phone] (home)  LM to call to reschedule 10 12 visit  [address] # is the same #   Called vero Gonzalez detailed VM to call scheduling [phone] option 1 to reschedule visit with DR Malika Kwon. 30 min slot needed. Also that referral was placed to [facility] to evaluate if she is candidate for open chart surgery vs PCI. Trinity Rodgesr will need to answer phone or call [facility] back when they call to set a visit.       Angela Duong RN

## 2020-10-05 RX ORDER — METOPROLOL TARTRATE 100 MG/1
TABLET ORAL
Qty: 180 TABLET | Refills: 0 | Status: SHIPPED | OUTPATIENT
Start: 2020-10-05

## 2020-10-05 RX ORDER — ISOSORBIDE MONONITRATE 60 MG/1
TABLET, EXTENDED RELEASE ORAL
Qty: 30 TABLET | Refills: 1 | Status: SHIPPED
Start: 2020-10-05 | End: 2020-11-30

## 2020-10-05 RX ORDER — SERTRALINE HYDROCHLORIDE 100 MG/1
TABLET, FILM COATED ORAL
Qty: 30 TABLET | Refills: 1 | Status: SHIPPED
Start: 2020-10-05 | End: 2020-11-30

## 2020-10-05 RX ORDER — BUMETANIDE 1 MG/1
TABLET ORAL
Qty: 30 TABLET | Refills: 1 | Status: SHIPPED
Start: 2020-10-05 | End: 2020-11-30

## 2020-10-05 RX ORDER — RANOLAZINE 1000 MG/1
TABLET, EXTENDED RELEASE ORAL
Qty: 60 TABLET | Refills: 1 | Status: SHIPPED
Start: 2020-10-05 | End: 2020-11-30

## 2020-10-05 RX ORDER — PANTOPRAZOLE SODIUM 40 MG/1
TABLET, DELAYED RELEASE ORAL
Qty: 30 TABLET | Refills: 1 | Status: SHIPPED
Start: 2020-10-05 | End: 2020-11-30

## 2020-10-05 RX ORDER — FOLIC ACID 1 MG/1
TABLET ORAL
Qty: 30 TABLET | Refills: 1 | Status: SHIPPED
Start: 2020-10-05 | End: 2020-11-30

## 2020-10-08 ENCOUNTER — TELEPHONE (OUTPATIENT)
Dept: CARDIOLOGY CLINIC | Age: 60
End: 2020-10-08

## 2020-10-08 NOTE — TELEPHONE ENCOUNTER
Verified with [facility]  10mg lisinopril is not scored. Not able to use 10mg take 1/2 tab twice daily    [facility] will not cover twice daily dosing of current script 5mg BID. Plan limit , reduce quantity to allowed quantity and days supply. DR Parks Erm would you like 5mg daily or 10mg daily for Mike Mcintyre   Not able to utilize 5mg BID. Please advise.      Bernadette Chacko RN

## 2020-10-12 RX ORDER — LISINOPRIL 10 MG/1
10 TABLET ORAL DAILY
Qty: 90 TABLET | Refills: 1 | Status: SHIPPED | OUTPATIENT
Start: 2020-10-12

## 2020-10-13 RX ORDER — AMLODIPINE BESYLATE 2.5 MG/1
TABLET ORAL
Qty: 30 TABLET | Refills: 0 | Status: SHIPPED | OUTPATIENT
Start: 2020-10-13

## 2020-10-23 ENCOUNTER — HOSPITAL ENCOUNTER (OUTPATIENT)
Age: 60
Discharge: HOME | End: 2020-10-23
Payer: MEDICAID

## 2020-10-23 ENCOUNTER — OFFICE VISIT (OUTPATIENT)
Dept: INTERNAL MEDICINE | Age: 60
End: 2020-10-23
Payer: MEDICAID

## 2020-10-23 VITALS
TEMPERATURE: 98 F | OXYGEN SATURATION: 98 % | RESPIRATION RATE: 20 BRPM | DIASTOLIC BLOOD PRESSURE: 67 MMHG | SYSTOLIC BLOOD PRESSURE: 153 MMHG | HEART RATE: 67 BPM

## 2020-10-23 DIAGNOSIS — E11.42 TYPE 2 DIABETES MELLITUS WITH DIABETIC POLYNEUROPATHY, UNSPECIFIED WHETHER LONG TERM INSULIN USE (HCC): Primary | ICD-10-CM

## 2020-10-23 DIAGNOSIS — I20.8 STABLE ANGINA (HCC): ICD-10-CM

## 2020-10-23 DIAGNOSIS — Z23 FLU VACCINE NEED: ICD-10-CM

## 2020-10-23 DIAGNOSIS — K59.00 CONSTIPATION, UNSPECIFIED CONSTIPATION TYPE: ICD-10-CM

## 2020-10-23 DIAGNOSIS — E66.01 CLASS 3 SEVERE OBESITY DUE TO EXCESS CALORIES WITH SERIOUS COMORBIDITY AND BODY MASS INDEX (BMI) OF 40.0 TO 44.9 IN ADULT (HCC): ICD-10-CM

## 2020-10-23 DIAGNOSIS — I73.9 PAD (PERIPHERAL ARTERY DISEASE) (HCC): ICD-10-CM

## 2020-10-23 DIAGNOSIS — Z12.31 ENCOUNTER FOR SCREENING MAMMOGRAM FOR MALIGNANT NEOPLASM OF BREAST: ICD-10-CM

## 2020-10-23 DIAGNOSIS — I10 ESSENTIAL HYPERTENSION: ICD-10-CM

## 2020-10-23 DIAGNOSIS — E11.42 DIABETIC POLYNEUROPATHY ASSOCIATED WITH TYPE 2 DIABETES MELLITUS (HCC): ICD-10-CM

## 2020-10-23 DIAGNOSIS — Z12.11 COLON CANCER SCREENING: ICD-10-CM

## 2020-10-23 DIAGNOSIS — Z12.4 CERVICAL CANCER SCREENING: ICD-10-CM

## 2020-10-23 DIAGNOSIS — E78.5 HYPERLIPIDEMIA WITH TARGET LDL LESS THAN 70: ICD-10-CM

## 2020-10-23 DIAGNOSIS — I50.22 CHRONIC SYSTOLIC CONGESTIVE HEART FAILURE (HCC): ICD-10-CM

## 2020-10-23 LAB
ANION GAP SERPL CALCULATED.3IONS-SCNC: 12 MMOL/L (ref 7–16)
BUN BLDV-MCNC: 19 MG/DL (ref 8–23)
CALCIUM SERPL-MCNC: 9.5 MG/DL (ref 8.6–10.2)
CHLORIDE BLD-SCNC: 102 MMOL/L (ref 98–107)
CO2: 25 MMOL/L (ref 22–29)
CREAT SERPL-MCNC: 0.9 MG/DL (ref 0.5–1)
GFR AFRICAN AMERICAN: >60
GFR NON-AFRICAN AMERICAN: >60 ML/MIN/1.73
GLUCOSE BLD-MCNC: 136 MG/DL (ref 74–99)
POTASSIUM SERPL-SCNC: 5.2 MMOL/L (ref 3.5–5)
SODIUM BLD-SCNC: 139 MMOL/L (ref 132–146)

## 2020-10-23 PROCEDURE — G8417 CALC BMI ABV UP PARAM F/U: HCPCS | Performed by: INTERNAL MEDICINE

## 2020-10-23 PROCEDURE — G8427 DOCREV CUR MEDS BY ELIG CLIN: HCPCS | Performed by: INTERNAL MEDICINE

## 2020-10-23 PROCEDURE — 2022F DILAT RTA XM EVC RTNOPTHY: CPT | Performed by: INTERNAL MEDICINE

## 2020-10-23 PROCEDURE — 90686 IIV4 VACC NO PRSV 0.5 ML IM: CPT

## 2020-10-23 PROCEDURE — 3051F HG A1C>EQUAL 7.0%<8.0%: CPT | Performed by: INTERNAL MEDICINE

## 2020-10-23 PROCEDURE — 1036F TOBACCO NON-USER: CPT | Performed by: INTERNAL MEDICINE

## 2020-10-23 PROCEDURE — G8482 FLU IMMUNIZE ORDER/ADMIN: HCPCS | Performed by: INTERNAL MEDICINE

## 2020-10-23 PROCEDURE — 3017F COLORECTAL CA SCREEN DOC REV: CPT | Performed by: INTERNAL MEDICINE

## 2020-10-23 PROCEDURE — 99213 OFFICE O/P EST LOW 20 MIN: CPT | Performed by: INTERNAL MEDICINE

## 2020-10-23 PROCEDURE — 6360000002 HC RX W HCPCS

## 2020-10-23 PROCEDURE — 99212 OFFICE O/P EST SF 10 MIN: CPT | Performed by: INTERNAL MEDICINE

## 2020-10-23 PROCEDURE — G0008 ADMIN INFLUENZA VIRUS VAC: HCPCS

## 2020-10-23 PROCEDURE — 80048 BASIC METABOLIC PNL TOTAL CA: CPT

## 2020-10-23 PROCEDURE — 36415 COLL VENOUS BLD VENIPUNCTURE: CPT

## 2020-10-23 RX ORDER — GABAPENTIN 600 MG/1
600 TABLET ORAL 3 TIMES DAILY
Qty: 270 TABLET | Refills: 0 | Status: SHIPPED | OUTPATIENT
Start: 2020-10-23 | End: 2021-01-21

## 2020-10-23 RX ORDER — NITROGLYCERIN 0.4 MG/1
0.4 TABLET SUBLINGUAL EVERY 5 MIN PRN
Qty: 25 TABLET | Refills: 0 | Status: SHIPPED | OUTPATIENT
Start: 2020-10-23

## 2020-10-23 RX ORDER — DOCUSATE SODIUM 100 MG/1
100 CAPSULE, LIQUID FILLED ORAL DAILY PRN
Qty: 90 CAPSULE | Refills: 0 | Status: SHIPPED | OUTPATIENT
Start: 2020-10-23

## 2020-10-23 RX ORDER — INSULIN GLARGINE 100 [IU]/ML
75 INJECTION, SOLUTION SUBCUTANEOUS NIGHTLY
Qty: 5 PEN | Refills: 3 | Status: SHIPPED | OUTPATIENT
Start: 2020-10-23

## 2020-10-23 RX ORDER — ZOSTER VACCINE RECOMBINANT, ADJUVANTED 50 MCG/0.5
0.5 KIT INTRAMUSCULAR SEE ADMIN INSTRUCTIONS
Qty: 0.5 ML | Refills: 0 | Status: SHIPPED | OUTPATIENT
Start: 2020-10-23 | End: 2021-04-21

## 2020-10-23 RX ORDER — ATORVASTATIN CALCIUM 40 MG/1
40 TABLET, FILM COATED ORAL DAILY
Qty: 90 TABLET | Refills: 0 | Status: SHIPPED | OUTPATIENT
Start: 2020-10-23

## 2020-10-23 SDOH — ECONOMIC STABILITY: INCOME INSECURITY: HOW HARD IS IT FOR YOU TO PAY FOR THE VERY BASICS LIKE FOOD, HOUSING, MEDICAL CARE, AND HEATING?: NOT VERY HARD

## 2020-10-23 SDOH — ECONOMIC STABILITY: TRANSPORTATION INSECURITY
IN THE PAST 12 MONTHS, HAS LACK OF TRANSPORTATION KEPT YOU FROM MEETINGS, WORK, OR FROM GETTING THINGS NEEDED FOR DAILY LIVING?: NO

## 2020-10-23 ASSESSMENT — ENCOUNTER SYMPTOMS
SINUS PRESSURE: 0
WHEEZING: 0
SINUS PAIN: 0
COUGH: 0
CONSTIPATION: 1
VOMITING: 0
SHORTNESS OF BREATH: 0
NAUSEA: 0
DIARRHEA: 0
BACK PAIN: 0

## 2020-10-23 ASSESSMENT — PATIENT HEALTH QUESTIONNAIRE - PHQ9
2. FEELING DOWN, DEPRESSED OR HOPELESS: 1
SUM OF ALL RESPONSES TO PHQ QUESTIONS 1-9: 2
SUM OF ALL RESPONSES TO PHQ9 QUESTIONS 1 & 2: 2

## 2020-10-23 NOTE — PROGRESS NOTES
Patient given instruction by Dr Justina Cantu. 3 month follow up scheduled with [facility]. Printed AVS given to patient.

## 2020-10-23 NOTE — PATIENT INSTRUCTIONS
Patient Education      follow up with pcp  Take meds as prescribed    Aixa Serrato, DO      Diet and Exercise for Metabolic Syndrome: Care Instructions  Your Care Instructions     Metabolic syndrome is the name for a group of health problems. It includes having too much fat around your waist and high blood pressure. It also includes high triglycerides, high blood sugar, and low levels of healthy (HDL) cholesterol. These problems make it more likely you will have a heart attack or stroke or get diabetes. Your family history (your genes) can cause metabolic syndrome. So can unhealthy eating habits and not getting enough exercise. You can help lower your risk of heart attack, stroke, and diabetes if you eat healthy foods and get more exercise. It may be hard to make these lifestyle changes. But even small changes can help. Follow-up care is a key part of your treatment and safety. Be sure to make and go to all appointments, and call your doctor if you are having problems. It's also a good idea to know your test results and keep a list of the medicines you take. How can you care for yourself at home? Eat more fruits and vegetables  · Fruits and vegetables have nutrients to help protect you from heart disease and high blood pressure. They are low in fat and high in fiber. Dark green, orange, and yellow ones are the healthiest.  · Keep lots of vegetables ready for snacks. · Buy fruit that is in season. Then put it where you can see it so you will want to eat it. · Cook dishes that have a lot of vegetables. Soups and stir-fries are good choices. Limit saturated and trans fats  · Read food labels, and try to avoid saturated and trans fats. They increase your risk of heart disease. · Use olive or canola oil when you cook. · Bake, broil, grill, or steam foods. Avoid fried foods. · Limit how much high-fat meat you eat. This includes hot dogs and sausages.  When you prepare meat, cut off all the fat.  · You can replace high-fat meat with fish and skinless poultry. You can also try products made from soybeans, like tofu. Soybeans may be very good for your heart. · Choose low-fat or fat-free milk and dairy products. Eat foods high in fiber  · Foods high in fiber may reduce your cholesterol. And they may give you important vitamins and minerals. Good examples are oatmeal, cooked dried beans, brown rice, citrus fruits, and apples. · Eat whole-grain breads and cereals. They have more fiber than white bread or pastries. Limit high-sugar foods  · Limit foods and drinks that are high in sugar. Some examples are soda pop, sugar-sweetened fruit drinks, candy, and many desserts. · Limit the amount of sugar, honey, and other sweeteners that you add to food and drinks. · Choose water instead of soda pop or other sugar-sweetened drinks. · Limit fruit juice. Limit salt and sodium  · You can help lower your blood pressure if you limit salt and sodium. · If you take the salt shaker off the table, it may be easier to use less salt. You can also try using half the salt in a recipe. And you can avoid adding salt to cooking water for pasta, rice, and potatoes. · Try to eat fewer snacks, fast foods, and other high-salt, processed foods. Check labels so you know how much sodium a food has. · Choose canned goods (soups, vegetables, and beans) that are low in sodium. Get regular exercise  · Get more exercise. Make sure your doctor knows when you start a new exercise program. Even small amounts of exercise will help you get stronger and have more energy. It can also help you manage your weight and your stress. · Walking is a good choice. Little by little, increase the amount you walk every day. Try for at least 30 minutes on most days of the week. Where can you learn more? Go to https://mahad.[employer]. org and sign in to your [employer] account.  Enter [id number] in the [employer] box to learn more about \"Diet and Exercise for Metabolic Syndrome: Care Instructions. \"     If you do not have an account, please click on the \"Sign Up Now\" link. Current as of: August 22, 2019               Content Version: 12.6  © 8903-6841 [employer]. Care instructions adapted under license by [facility] ([facility]). If you have questions about a medical condition or this instruction, always ask your healthcare professional. [address] any warranty or liability for your use of this information.

## 2020-10-23 NOTE — PROGRESS NOTES
Attending Physician Statement  I have discussed the case, including pertinent history and exam findings with the resident. I reviewed medical, surg, soc histories, meds and any pertinent labs. I agree with the assessment, plan and orders as documented by the resident. Hx HTN, chronic HF, CAD, DM on insulin, hyperlipidemia, AKA and BKA. Last HBA1c 7.7% 8/16/20. BP is elevated today and patient states she did not take her BP meds because she got up late. She typically gets up late morning and takes her meds around 10-12 noon. She usually stays up until 2-3 am. Discussed need for regular schedule and compliance. Also needs re-assessment of lipids and if TG > 500 will need to add fibric acid. Doris Capone

## 2020-10-23 NOTE — PROGRESS NOTES
[facility]  InternalMedicine Residency Program  ACC Note      SUBJECTIVE:  CC: had concerns including Medication Refill and 6 Month Follow-Up. Divine Chavarria presented to the [facility] for a routine visit. PMHx of  has a past medical history of MATTHEW (acute kidney injury) ([address] Anemia, Aorto-iliac atherosclerosis ([address] Atherosclerosis of native arteries of left leg with ulceration of other part of foot ([address] Atherosclerosis of native arteries of the extremities with gangrene ([address] Atherosclerosis of native arteries of the extremities with ulceration(440.23), Atherosclerosis of native artery of left lower extremity with ulceration ([address] Blood transfusion reaction, CAD (coronary artery disease), Charcot's joint of foot due to diabetes ([address] CHF (congestive heart failure) ([address] Chronic osteomyelitis, ankle and foot, Depression, Diabetic neuropathy ([address] Diabetic ulcer of left foot associated with type 1 diabetes mellitus ([address] Femoral-popliteal atherosclerosis ([address] Foot ulceration ([address] History of blood transfusion, HTN (hypertension), Hyperlipidemia, Ischemic cardiomyopathy, Mitral regurgitation, Non-pressure chronic ulcer of left lower leg with fat layer exposed ([address] PVD (peripheral vascular disease) ([address] Systolic heart failure ([address] Type II or unspecified type diabetes mellitus with other specified manifestations, not stated as uncontrolled, Ulcer of leg due to secondary diabetes ([address] Ulcer of other part of foot, and Unspecified diseases of blood and blood-forming organs. Patient presents for follow up and meds refill. Since last visit she has been in generally good health. Safe COVI-19 pandemic. No CC to be addressed today. Review of Systems   Constitutional: Negative for activity change, appetite change, chills and fever. HENT: Negative for sinus pressure, sinus pain and sneezing. Respiratory: Negative for cough, shortness of breath and wheezing. Thought Content: Thought content normal.         ASSESSMENT/PLAN:    I have reviewed all pertient PMHx, PSHx, FamHx, Social Hx, medications, and allergies andupdated history as appropriate. Damian Matt was seen today for medication refill and 6 month follow-up. Diagnoses and all orders for this visit:    Type 2 diabetes mellitus with diabetic polyneuropathy, unspecified whether long term insulin use (HCC)  -     insulin glargine (LANTUS SOLOSTAR) 100 UNIT/ML injection pen; Inject 75 Units into the skin nightly  -     insulin lispro (HUMALOG) 100 UNIT/ML injection vial; Please take 18U three times daily with meals (before meals)  -     metFORMIN (GLUCOPHAGE) 500 MG tablet; Take 1 tablet by mouth 2 times daily (with meals)  -     Ginger Noriega MD, Bariatrics, Surgical Weight Loss [address] [place] [address]; Future    Essential hypertension  -     BASIC METABOLIC PANEL; Future    Hyperlipidemia with target LDL less than 70  -     atorvastatin (LIPITOR) 40 MG tablet; Take 1 tablet by mouth daily  -     LIPID PANEL; Future  -     Ginger Noriega MD, [facility] Surgical [facility] [place]    Diabetic polyneuropathy associated with type 2 diabetes mellitus ([facility]  -     gabapentin (NEURONTIN) 600 MG tablet; Take 1 tablet by mouth 3 times daily for 90 days. Stable angina ([facility])  -     nitroGLYCERIN (NITROSTAT) 0.4 MG SL tablet; Place 1 tablet under the tongue every 5 minutes as needed for Chest pain    Constipation, unspecified constipation type  -     docusate sodium (COLACE) 100 MG capsule;  Take 1 capsule by mouth daily as needed for Constipation    Class 3 severe obesity due to excess calories with serious comorbidity and body mass index (BMI) of 40.0 to 44.9 in adult [facility])  -     Ginger Noriega MD, Bariatrics, [facility]    Chronic systolic congestive heart failure [facility])    PAD (peripheral artery disease) ([facility] Encounter for screening mammogram for malignant neoplasm of breast  -     MARIA ISABEL DIGITAL DIAGNOSTIC BILATERAL PER PROTOCOL; Future    Cervical cancer screening  -     Taylor Yang MD, OB/GYN, [facility], Curry Leiva MD, General Surgery, [place]    Flu vaccine need  -     INFLUENZA, QUADV, 3 YRS AND OLDER, IM PF, PREFILL SYR OR SDV, 0.5ML (AFLURIA QUADV, PF)    Other orders  -     zoster recombinant adjuvanted vaccine [facility]) 50 MCG/0.5ML SUSR injection; Inject 0.5 mLs into the muscle See Admin Instructions 1 dose now and repeat in 2-6 months          RTC:     I have reviewed my findings andrecommendations with Aileen Arnett and attending physician.     Tammi Malave DO PGY2  10/23/2020 4:39 PM

## 2020-10-27 ENCOUNTER — TELEPHONE (OUTPATIENT)
Dept: SURGERY | Age: 60
End: 2020-10-27

## 2020-10-27 NOTE — TELEPHONE ENCOUNTER
MA made first attempt to contact patient to schedule appointment based on referral. Patient did not answer and voice mailbox was full. MA to try back.     Electronically signed by Milton Dumont on 10/27/20 at 2:04 PM EDT

## 2020-11-02 ENCOUNTER — TELEPHONE (OUTPATIENT)
Dept: BARIATRICS/WEIGHT MGMT | Age: 60
End: 2020-11-02

## 2020-11-02 RX ORDER — ASPIRIN 81 MG/1
TABLET, DELAYED RELEASE ORAL
Qty: 30 TABLET | Refills: 3 | Status: SHIPPED | OUTPATIENT
Start: 2020-11-02

## 2020-11-03 ENCOUNTER — TELEPHONE (OUTPATIENT)
Dept: SURGERY | Age: 60
End: 2020-11-03

## 2020-11-03 NOTE — TELEPHONE ENCOUNTER
MA attempted to contact patient to schedule appointment based on referral. Patient did not answer so MA left message requesting return call to schedule.      Electronically signed by Jerrell Cortes on 11/3/20 at 2:33 PM EST

## 2020-11-06 RX ORDER — ATORVASTATIN CALCIUM 80 MG/1
TABLET, FILM COATED ORAL
Qty: 90 TABLET | Refills: 3 | OUTPATIENT
Start: 2020-11-06

## 2020-11-09 ENCOUNTER — TELEPHONE (OUTPATIENT)
Dept: SURGERY | Age: 60
End: 2020-11-09

## 2020-11-09 NOTE — TELEPHONE ENCOUNTER
MA made third attempt to contact patient to schedule appointment based on referral. Patient stated this is not the best time and would like to reconsider into the new year. MA informed patient we would defer the referral until January of 2021 and call her then to re discuss. Patient informed to call back if she changes her mind.      Electronically signed by Hedy Azul on 11/9/20 at 3:16 PM EST

## 2020-11-25 ENCOUNTER — TELEPHONE (OUTPATIENT)
Dept: CARDIOLOGY CLINIC | Age: 60
End: 2020-11-25

## 2020-11-25 NOTE — TELEPHONE ENCOUNTER
Pt passed on the  at home and the  called wanting to know if you'll sign the death certificate?  I spoke with Gaviota Sexton and she can be reached at [phone]

## 2020-11-30 RX ORDER — PANTOPRAZOLE SODIUM 40 MG/1
TABLET, DELAYED RELEASE ORAL
Qty: 30 TABLET | Refills: 1 | Status: SHIPPED | OUTPATIENT
Start: 2020-11-30

## 2020-11-30 RX ORDER — BUMETANIDE 1 MG/1
TABLET ORAL
Qty: 30 TABLET | Refills: 1 | Status: SHIPPED | OUTPATIENT
Start: 2020-11-30

## 2020-11-30 RX ORDER — SERTRALINE HYDROCHLORIDE 100 MG/1
TABLET, FILM COATED ORAL
Qty: 30 TABLET | Refills: 1 | Status: SHIPPED | OUTPATIENT
Start: 2020-11-30

## 2020-11-30 RX ORDER — ISOSORBIDE MONONITRATE 60 MG/1
TABLET, EXTENDED RELEASE ORAL
Qty: 30 TABLET | Refills: 1 | Status: SHIPPED | OUTPATIENT
Start: 2020-11-30

## 2020-11-30 RX ORDER — RANOLAZINE 1000 MG/1
TABLET, EXTENDED RELEASE ORAL
Qty: 60 TABLET | Refills: 1 | Status: SHIPPED | OUTPATIENT
Start: 2020-11-30

## 2020-11-30 RX ORDER — FOLIC ACID 1 MG/1
TABLET ORAL
Qty: 30 TABLET | Refills: 1 | Status: SHIPPED | OUTPATIENT
Start: 2020-11-30

## 2021-01-11 PROBLEM — R09.02 HYPOXIA: Status: RESOLVED | Noted: 2019-12-23 | Resolved: 2021-01-11

## 2021-01-11 PROBLEM — R74.01 TRANSAMINITIS: Status: RESOLVED | Noted: 2019-12-01 | Resolved: 2021-01-11

## 2021-01-11 PROBLEM — R41.82 AMS (ALTERED MENTAL STATUS): Status: RESOLVED | Noted: 2019-12-13 | Resolved: 2021-01-11
